# Patient Record
Sex: FEMALE | Race: WHITE | Employment: FULL TIME | URBAN - METROPOLITAN AREA
[De-identification: names, ages, dates, MRNs, and addresses within clinical notes are randomized per-mention and may not be internally consistent; named-entity substitution may affect disease eponyms.]

---

## 2017-01-17 ENCOUNTER — TRANSFERRED RECORDS (OUTPATIENT)
Dept: HEALTH INFORMATION MANAGEMENT | Facility: CLINIC | Age: 42
End: 2017-01-17

## 2017-04-24 NOTE — PROGRESS NOTES
HPI   SUBJECTIVE:                                                    Oly Rush is a 41 year old female who presents to clinic today for the following health issues:    Diarrhea     Onset: March 9th     Description:   Consistency of stool: watery and yellow  Blood in stool: no   Number of loose stools in past 24 hours: 6    Progression of Symptoms:  same    Accompanying Signs & Symptoms:  Fever: YES-  Nausea or vomiting; YES- nauseated   Abdominal pain: YES-and also right-sided flank pain   Episodes of constipation: no   Weight loss: YES   History:   Ill contacts: no   Recent use of antibiotics: no    Recent travels: no          Recent medication-new or changes(Rx or OTC): YES- CBD oil for chronic pain issues. PROBIOTICS EVERY DAY. Taking more natural stuff.     Precipitating factors:   Nothing in particular     Alleviating factors:   Nothing        Therapies Tried and outcome:  NOTHING; Outcome: N/A     Manasa is here today to address ongoing issues with diarrhea since March 9th. Also notes of ongoing cramping and right sided abdominal pain, especially following bowel movements. Notes that she is water drinker, but still feeling dehydrated. Issues started March 9th, notes of yellow watery stools 5-6 times. Over the course of the last weeks had improved to just 1-2 episodes of watery stools. Since the last weekend it has worsened to 5-6 episode. Did not do anything different this past weekend that would cause it to flare up.  Notes of one accident. This happens after eating every meal. Only medication change was starting CBD oil for pain. Oil is extracted from hemp. CBD oil has canaboid properties that help with . Do not drink, just place oil under tongue. Did stop this, just to see if contributing to diarrhea issues. Stopped 3-3 1/2 weeks, no changes. Has not taken anything to slow down diarrhea as she was afraid it would come out the other end. No issues with urine.       Problem list and histories reviewed &  "adjusted, as indicated.  Additional history: as documented    BP Readings from Last 3 Encounters:   04/25/17 114/74   12/30/16 106/76   12/23/16 116/70    Wt Readings from Last 3 Encounters:   04/25/17 87.2 kg (192 lb 4.8 oz)   12/30/16 88 kg (194 lb)   12/23/16 88 kg (194 lb)                  Labs reviewed in EPIC    Reviewed and updated as needed this visit by clinical staff  Tobacco  Allergies  Meds  Problems  Med Hx  Surg Hx  Fam Hx  Soc Hx        Reviewed and updated as needed this visit by Provider         ROS:  CONSTITUTIONAL:POSITIVE  for fever   INTEGUMENTARY/SKIN: NEGATIVE for worrisome rashes, moles or lesions  GI: POSITIVE for diarrhea  MUSCULOSKELETAL: NEGATIVE for significant arthralgias or myalgia  PSYCHIATRIC: NEGATIVE for changes in mood or affect    This document serves as a record of the services and decisions personally performed and made by Carrie Prasad MD. It was created on her behalf by Mel Mina, a trained medical scribe. The creation of this document is based the provider's statements to the medical scribe.  Mel Mina April 25, 2017 9:16 AM    OBJECTIVE:                                                    /74 (BP Location: Right arm, Patient Position: Chair, Cuff Size: Adult Regular)  Pulse 88  Temp 98.1  F (36.7  C) (Oral)  Resp 16  Ht 1.651 m (5' 5\")  Wt 87.2 kg (192 lb 4.8 oz)  LMP 09/26/2012  SpO2 99%  BMI 32 kg/m2  Body mass index is 32 kg/(m^2).  GENERAL: healthy, alert and no distress  EYES: Eyes grossly normal to inspection, PERRL and conjunctivae and sclerae normal  HENT: ear canals and TM's normal, nose and mouth without ulcers or lesions  NECK: no adenopathy, no asymmetry, masses, or scars and thyroid normal to palpation  ABDOMEN: slight tenderness-generalized , no rebound or guarding,  bowel sounds normal  MS: no gross musculoskeletal defects noted, no edema  SKIN: no suspicious lesions or rashes  NEURO: Normal strength and tone, mentation intact " and speech normal  PSYCH: mentation appears normal, affect normal/bright  NO CVA tenderness    Diagnostic Test Results:  Results for orders placed or performed in visit on 04/25/17 (from the past 24 hour(s))   CBC with platelets and differential   Result Value Ref Range    WBC 10.1 4.0 - 11.0 10e9/L    RBC Count 4.62 3.8 - 5.2 10e12/L    Hemoglobin 14.1 11.7 - 15.7 g/dL    Hematocrit 41.9 35.0 - 47.0 %    MCV 91 78 - 100 fl    MCH 30.5 26.5 - 33.0 pg    MCHC 33.7 31.5 - 36.5 g/dL    RDW 12.7 10.0 - 15.0 %    Platelet Count 375 150 - 450 10e9/L    Diff Method Automated Method     % Neutrophils 69.0 %    % Lymphocytes 22.1 %    % Monocytes 7.5 %    % Eosinophils 0.9 %    % Basophils 0.5 %    Absolute Neutrophil 7.0 1.6 - 8.3 10e9/L    Absolute Lymphocytes 2.2 0.8 - 5.3 10e9/L    Absolute Monocytes 0.8 0.0 - 1.3 10e9/L    Absolute Eosinophils 0.1 0.0 - 0.7 10e9/L    Absolute Basophils 0.1 0.0 - 0.2 10e9/L        ASSESSMENT/PLAN:                                                    (R19.7) Diarrhea, unspecified type  (primary encounter diagnosis)  Comment: Continue with BRAT diet. Lomotil to use as needed throughout the day for cramping. Will complete stool tests. If all normal, may complete US. Also discussed treating with cipro.   Plan: Enteric Bacteria and Virus Panel by [facility] Stool,         Clostridium difficile Toxin B PCR, CBC with         platelets and differential, Comprehensive         metabolic panel, Giardia antigen,         diphenoxylate-atropine (LOMOTIL) 2.5-0.025 MG         per tablet        Follow up per stool results.     The information in this document, created by the medical scribe for me, accurately reflects the services I personally performed and the decisions made by me. I have reviewed and approved this document for accuracy prior to leaving the patient care area.  Carrie Prasad MD 9:16 AM 4/25/2017          Carrie Prasad MD  [facility]      Physical Exam

## 2017-04-25 ENCOUNTER — OFFICE VISIT (OUTPATIENT)
Dept: FAMILY MEDICINE | Facility: CLINIC | Age: 42
End: 2017-04-25
Payer: COMMERCIAL

## 2017-04-25 VITALS
HEART RATE: 88 BPM | OXYGEN SATURATION: 99 % | HEIGHT: 65 IN | TEMPERATURE: 98.1 F | DIASTOLIC BLOOD PRESSURE: 74 MMHG | SYSTOLIC BLOOD PRESSURE: 114 MMHG | RESPIRATION RATE: 16 BRPM | WEIGHT: 192.3 LBS | BODY MASS INDEX: 32.04 KG/M2

## 2017-04-25 DIAGNOSIS — R19.7 DIARRHEA, UNSPECIFIED TYPE: Primary | ICD-10-CM

## 2017-04-25 LAB
BASOPHILS # BLD AUTO: 0.1 10E9/L (ref 0–0.2)
BASOPHILS NFR BLD AUTO: 0.5 %
C DIFF TOX B STL QL: NORMAL
CAMPYLOBACTER GROUP BY NAT: NOT DETECTED
DIFFERENTIAL METHOD BLD: NORMAL
ENTERIC PATHOGEN COMMENT: NORMAL
EOSINOPHIL # BLD AUTO: 0.1 10E9/L (ref 0–0.7)
EOSINOPHIL NFR BLD AUTO: 0.9 %
ERYTHROCYTE [DISTWIDTH] IN BLOOD BY AUTOMATED COUNT: 12.7 % (ref 10–15)
HCT VFR BLD AUTO: 41.9 % (ref 35–47)
HGB BLD-MCNC: 14.1 G/DL (ref 11.7–15.7)
LYMPHOCYTES # BLD AUTO: 2.2 10E9/L (ref 0.8–5.3)
LYMPHOCYTES NFR BLD AUTO: 22.1 %
MCH RBC QN AUTO: 30.5 PG (ref 26.5–33)
MCHC RBC AUTO-ENTMCNC: 33.7 G/DL (ref 31.5–36.5)
MCV RBC AUTO: 91 FL (ref 78–100)
MONOCYTES # BLD AUTO: 0.8 10E9/L (ref 0–1.3)
MONOCYTES NFR BLD AUTO: 7.5 %
NEUTROPHILS # BLD AUTO: 7 10E9/L (ref 1.6–8.3)
NEUTROPHILS NFR BLD AUTO: 69 %
NOROVIRUS I AND II BY NAT: NOT DETECTED
PLATELET # BLD AUTO: 375 10E9/L (ref 150–450)
RBC # BLD AUTO: 4.62 10E12/L (ref 3.8–5.2)
ROTAVIRUS A BY NAT: NOT DETECTED
SALMONELLA SPECIES BY NAT: NOT DETECTED
SHIGA TOXIN 1 GENE BY NAT: NOT DETECTED
SHIGA TOXIN 2 GENE BY NAT: NOT DETECTED
SHIGELLA SP+EIEC IPAH STL QL NAA+PROBE: NOT DETECTED
SPECIMEN SOURCE: NORMAL
VIBRIO GROUP BY NAT: NOT DETECTED
WBC # BLD AUTO: 10.1 10E9/L (ref 4–11)
YERSINIA ENTEROCOLITICA BY NAT: NOT DETECTED

## 2017-04-25 PROCEDURE — 99213 OFFICE O/P EST LOW 20 MIN: CPT | Performed by: FAMILY MEDICINE

## 2017-04-25 PROCEDURE — 85025 COMPLETE CBC W/AUTO DIFF WBC: CPT | Performed by: FAMILY MEDICINE

## 2017-04-25 PROCEDURE — 87493 C DIFF AMPLIFIED PROBE: CPT | Mod: 59 | Performed by: FAMILY MEDICINE

## 2017-04-25 PROCEDURE — 87329 GIARDIA AG IA: CPT | Performed by: FAMILY MEDICINE

## 2017-04-25 PROCEDURE — 87506 IADNA-DNA/RNA PROBE TQ 6-11: CPT | Performed by: FAMILY MEDICINE

## 2017-04-25 PROCEDURE — 36415 COLL VENOUS BLD VENIPUNCTURE: CPT | Performed by: FAMILY MEDICINE

## 2017-04-25 PROCEDURE — 80053 COMPREHEN METABOLIC PANEL: CPT | Performed by: FAMILY MEDICINE

## 2017-04-25 RX ORDER — DIPHENOXYLATE HCL/ATROPINE 2.5-.025MG
2 TABLET ORAL 4 TIMES DAILY PRN
Qty: 30 TABLET | Refills: 0 | Status: SHIPPED | OUTPATIENT
Start: 2017-04-25 | End: 2017-07-31

## 2017-04-25 ASSESSMENT — PAIN SCALES - GENERAL: PAINLEVEL: MODERATE PAIN (4)

## 2017-04-25 NOTE — MR AVS SNAPSHOT
After Visit Summary   4/25/2017    Oly Rush    MRN: [id number]           Patient Information     Date Of Birth          1975        Visit Information        Provider Department      4/25/2017 9:00 AM Carrie Prasad MD [facility]        Today's Diagnoses     Diarrhea, unspecified type    -  1       Follow-ups after your visit        Future tests that were ordered for you today     Open Future Orders        Priority Expected Expires Ordered    Enteric Bacteria and Virus Panel by [facility] Stool Routine  4/25/2018 4/25/2017    Clostridium difficile Toxin B PCR Routine  4/25/2018 4/25/2017    Giardia antigen Routine  4/25/2018 4/25/2017            Who to contact     If you have questions or need follow up information about today's clinic visit or your schedule please contact [facility] directly at [phone].  Normal or non-critical lab and imaging results will be communicated to you by MyChart, letter or phone within 4 business days after the clinic has received the results. If you do not hear from us within 7 days, please contact the clinic through MyChart or phone. If you have a critical or abnormal lab result, we will notify you by phone as soon as possible.  Submit refill requests through [facility] or call your pharmacy and they will forward the refill request to us. Please allow 3 business days for your refill to be completed.          Additional Information About Your Visit        MyChart Information     [facility] [employer] gives you secure access to your electronic health record. If you see a primary care provider, you can also send messages to your care team and make appointments. If you have questions, please call your primary care clinic.  If you do not have a primary care provider, please call [phone] and they will assist you.        Care EveryWhere ID     This is your Care EveryWhere ID. This could be used by other organizations to access your  "[facility] medical records  [id number]        Your Vitals Were     Pulse Temperature Respirations Height Last Period Pulse Oximetry    88 98.1  F (36.7  C) (Oral) 16 5' 5\" (1.651 m) 09/26/2012 99%    BMI (Body Mass Index)                   32 kg/m2            Blood Pressure from Last 3 Encounters:   04/25/17 114/74   12/30/16 106/76   12/23/16 116/70    Weight from Last 3 Encounters:   04/25/17 192 lb 4.8 oz (87.2 kg)   12/30/16 194 lb (88 kg)   12/23/16 194 lb (88 kg)              We Performed the Following     CBC with platelets and differential     Comprehensive metabolic panel          Today's Medication Changes          These changes are accurate as of: 4/25/17  9:44 AM.  If you have any questions, ask your nurse or doctor.               Start taking these medicines.        Dose/Directions    diphenoxylate-atropine 2.5-0.025 MG per tablet   Commonly known as:  LOMOTIL   Used for:  Diarrhea, unspecified type   Started by:  Carrie Prasad MD        Dose:  2 tablet   Take 2 tablets by mouth 4 times daily as needed for diarrhea   Quantity:  30 tablet   Refills:  0            Where to get your medicines      Some of these will need a paper prescription and others can be bought over the counter.  Ask your nurse if you have questions.     Bring a paper prescription for each of these medications     diphenoxylate-atropine 2.5-0.025 MG per tablet                Primary Care Provider Office Phone # Fax #    Carrie Prasad [phone]       [facility] [id number]  [address]   [facility] [id number]        Thank you!     Thank you for choosing [facility]  for your care. Our goal is always to provide you with excellent care. Hearing back from our patients is one way we can continue to improve our services. Please take a few minutes to complete the written survey that you may receive in the mail after your visit with us. Thank you!             Your Updated Medication " List - Protect others around you: Learn how to safely use, store and throw away your medicines at www.disposemymeds.org.          This list is accurate as of: 4/25/17  9:44 AM.  Always use your most recent med list.                   Brand Name Dispense Instructions for use    cyclobenzaprine 5 MG tablet    FLEXERIL    30 tablet    Take 1 tablet (5 mg) by mouth nightly as needed for muscle spasms       diphenoxylate-atropine 2.5-0.025 MG per tablet    LOMOTIL    30 tablet    Take 2 tablets by mouth 4 times daily as needed for diarrhea       fluticasone 50 MCG/ACT spray    FLONASE    1 Bottle    Spray 1-2 sprays into both nostrils daily       methocarbamol 500 MG tablet    ROBAXIN    60 tablet    Take 1 tablet (500 mg) by mouth 4 times daily as needed for muscle spasms

## 2017-04-26 LAB
ALBUMIN SERPL-MCNC: 3.8 G/DL (ref 3.4–5)
ALP SERPL-CCNC: 99 U/L (ref 40–150)
ALT SERPL W P-5'-P-CCNC: 41 U/L (ref 0–50)
ANION GAP SERPL CALCULATED.3IONS-SCNC: 8 MMOL/L (ref 3–14)
AST SERPL W P-5'-P-CCNC: 30 U/L (ref 0–45)
BILIRUB SERPL-MCNC: 0.6 MG/DL (ref 0.2–1.3)
BUN SERPL-MCNC: 7 MG/DL (ref 7–30)
CALCIUM SERPL-MCNC: 9 MG/DL (ref 8.5–10.1)
CHLORIDE SERPL-SCNC: 105 MMOL/L (ref 94–109)
CO2 SERPL-SCNC: 27 MMOL/L (ref 20–32)
CREAT SERPL-MCNC: 0.74 MG/DL (ref 0.52–1.04)
G LAMBLIA AG STL QL IA: NORMAL
GFR SERPL CREATININE-BSD FRML MDRD: 86 ML/MIN/1.7M2
GLUCOSE SERPL-MCNC: 84 MG/DL (ref 70–99)
MICRO REPORT STATUS: NORMAL
POTASSIUM SERPL-SCNC: 4 MMOL/L (ref 3.4–5.3)
PROT SERPL-MCNC: 7.7 G/DL (ref 6.8–8.8)
SODIUM SERPL-SCNC: 140 MMOL/L (ref 133–144)
SPECIMEN SOURCE: NORMAL

## 2017-04-27 ENCOUNTER — APPOINTMENT (OUTPATIENT)
Dept: ULTRASOUND IMAGING | Facility: CLINIC | Age: 42
End: 2017-04-27
Attending: EMERGENCY MEDICINE
Payer: COMMERCIAL

## 2017-04-27 ENCOUNTER — HOSPITAL ENCOUNTER (EMERGENCY)
Facility: CLINIC | Age: 42
Discharge: HOME | End: 2017-04-27
Attending: EMERGENCY MEDICINE | Admitting: EMERGENCY MEDICINE
Payer: COMMERCIAL

## 2017-04-27 ENCOUNTER — TELEPHONE (OUTPATIENT)
Dept: FAMILY MEDICINE | Facility: CLINIC | Age: 42
End: 2017-04-27

## 2017-04-27 VITALS
RESPIRATION RATE: 20 BRPM | DIASTOLIC BLOOD PRESSURE: 83 MMHG | SYSTOLIC BLOOD PRESSURE: 132 MMHG | HEART RATE: 76 BPM | BODY MASS INDEX: 31.78 KG/M2 | TEMPERATURE: 98 F | WEIGHT: 191 LBS | OXYGEN SATURATION: 99 %

## 2017-04-27 DIAGNOSIS — R19.7 DIARRHEA, UNSPECIFIED TYPE: Primary | ICD-10-CM

## 2017-04-27 DIAGNOSIS — R19.7 DIARRHEA, UNSPECIFIED TYPE: ICD-10-CM

## 2017-04-27 DIAGNOSIS — R10.84 ABDOMINAL PAIN, GENERALIZED: ICD-10-CM

## 2017-04-27 LAB
ALBUMIN SERPL-MCNC: 3.7 G/DL (ref 3.4–5)
ALBUMIN UR-MCNC: NEGATIVE MG/DL
ALP SERPL-CCNC: 95 U/L (ref 40–150)
ALT SERPL W P-5'-P-CCNC: 41 U/L (ref 0–50)
ANION GAP SERPL CALCULATED.3IONS-SCNC: 5 MMOL/L (ref 3–14)
APPEARANCE UR: CLEAR
AST SERPL W P-5'-P-CCNC: 28 U/L (ref 0–45)
BACTERIA #/AREA URNS HPF: ABNORMAL /HPF
BILIRUB SERPL-MCNC: 0.3 MG/DL (ref 0.2–1.3)
BILIRUB UR QL STRIP: NEGATIVE
BUN SERPL-MCNC: 7 MG/DL (ref 7–30)
CALCIUM SERPL-MCNC: 8.5 MG/DL (ref 8.5–10.1)
CHLORIDE SERPL-SCNC: 106 MMOL/L (ref 94–109)
CO2 SERPL-SCNC: 29 MMOL/L (ref 20–32)
COLOR UR AUTO: ABNORMAL
CREAT SERPL-MCNC: 0.72 MG/DL (ref 0.52–1.04)
ERYTHROCYTE [DISTWIDTH] IN BLOOD BY AUTOMATED COUNT: 12.7 % (ref 10–15)
GFR SERPL CREATININE-BSD FRML MDRD: 88 ML/MIN/1.7M2
GLUCOSE SERPL-MCNC: 78 MG/DL (ref 70–99)
GLUCOSE UR STRIP-MCNC: NEGATIVE MG/DL
HCT VFR BLD AUTO: 42.7 % (ref 35–47)
HGB BLD-MCNC: 14.3 G/DL (ref 11.7–15.7)
HGB UR QL STRIP: NEGATIVE
KETONES UR STRIP-MCNC: NEGATIVE MG/DL
LEUKOCYTE ESTERASE UR QL STRIP: NEGATIVE
LIPASE SERPL-CCNC: 109 U/L (ref 73–393)
MCH RBC QN AUTO: 30.6 PG (ref 26.5–33)
MCHC RBC AUTO-ENTMCNC: 33.5 G/DL (ref 31.5–36.5)
MCV RBC AUTO: 91 FL (ref 78–100)
NITRATE UR QL: NEGATIVE
PH UR STRIP: 7 PH (ref 5–7)
PLATELET # BLD AUTO: 368 10E9/L (ref 150–450)
POTASSIUM SERPL-SCNC: 3.4 MMOL/L (ref 3.4–5.3)
PROT SERPL-MCNC: 7.7 G/DL (ref 6.8–8.8)
RBC # BLD AUTO: 4.67 10E12/L (ref 3.8–5.2)
RBC #/AREA URNS AUTO: <1 /HPF (ref 0–2)
SODIUM SERPL-SCNC: 140 MMOL/L (ref 133–144)
SP GR UR STRIP: 1 (ref 1–1.03)
SQUAMOUS #/AREA URNS AUTO: <1 /HPF (ref 0–1)
URN SPEC COLLECT METH UR: ABNORMAL
UROBILINOGEN UR STRIP-MCNC: 0 MG/DL (ref 0–2)
WBC # BLD AUTO: 13.1 10E9/L (ref 4–11)
WBC #/AREA URNS AUTO: 0 /HPF (ref 0–2)

## 2017-04-27 PROCEDURE — 80053 COMPREHEN METABOLIC PANEL: CPT | Performed by: EMERGENCY MEDICINE

## 2017-04-27 PROCEDURE — 96361 HYDRATE IV INFUSION ADD-ON: CPT

## 2017-04-27 PROCEDURE — 85027 COMPLETE CBC AUTOMATED: CPT | Performed by: EMERGENCY MEDICINE

## 2017-04-27 PROCEDURE — 99285 EMERGENCY DEPT VISIT HI MDM: CPT | Mod: 25

## 2017-04-27 PROCEDURE — 76705 ECHO EXAM OF ABDOMEN: CPT

## 2017-04-27 PROCEDURE — 81001 URINALYSIS AUTO W/SCOPE: CPT | Performed by: EMERGENCY MEDICINE

## 2017-04-27 PROCEDURE — 25000128 H RX IP 250 OP 636: Performed by: EMERGENCY MEDICINE

## 2017-04-27 PROCEDURE — 96375 TX/PRO/DX INJ NEW DRUG ADDON: CPT

## 2017-04-27 PROCEDURE — 83690 ASSAY OF LIPASE: CPT | Performed by: EMERGENCY MEDICINE

## 2017-04-27 PROCEDURE — 96374 THER/PROPH/DIAG INJ IV PUSH: CPT

## 2017-04-27 RX ORDER — ONDANSETRON 2 MG/ML
4 INJECTION INTRAMUSCULAR; INTRAVENOUS ONCE
Status: COMPLETED | OUTPATIENT
Start: 2017-04-27 | End: 2017-04-27

## 2017-04-27 RX ORDER — KETOROLAC TROMETHAMINE 15 MG/ML
15 INJECTION, SOLUTION INTRAMUSCULAR; INTRAVENOUS ONCE
Status: COMPLETED | OUTPATIENT
Start: 2017-04-27 | End: 2017-04-27

## 2017-04-27 RX ORDER — DICYCLOMINE HCL 20 MG
20 TABLET ORAL 4 TIMES DAILY PRN
Qty: 30 TABLET | Refills: 0 | Status: SHIPPED | OUTPATIENT
Start: 2017-04-27 | End: 2017-07-31

## 2017-04-27 RX ADMIN — ONDANSETRON 4 MG: 2 INJECTION INTRAMUSCULAR; INTRAVENOUS at 16:18

## 2017-04-27 RX ADMIN — KETOROLAC TROMETHAMINE 15 MG: 15 INJECTION, SOLUTION INTRAMUSCULAR; INTRAVENOUS at 16:18

## 2017-04-27 RX ADMIN — SODIUM CHLORIDE 1000 ML: 9 INJECTION, SOLUTION INTRAVENOUS at 16:19

## 2017-04-27 ASSESSMENT — ENCOUNTER SYMPTOMS
BACK PAIN: 1
LIGHT-HEADEDNESS: 1
ABDOMINAL PAIN: 1
DIARRHEA: 1
DIZZINESS: 1
VOMITING: 0

## 2017-04-27 NOTE — ED AVS SNAPSHOT
[facility] Emergency Department    [address]    [facility] [address]    Phone:  [phone]    Fax:  [phone]                                       Oly Rush   MRN: [id number]    Department:  [facility] Emergency Department   Date of Visit:  4/27/2017           Patient Information     Date Of Birth          1975        Your diagnoses for this visit were:     Abdominal pain, generalized     Diarrhea, unspecified type        You were seen by Gage Jane MD.      Follow-up Information     Follow up with Carrie Prasad MD. Schedule an appointment as soon as possible for a visit in 5 days.    Specialty:  Family Practice    Contact information:    [facility]  [address]   [facility] [address]  [phone]          Discharge Instructions       Please make an appointment to follow up with [facility] [phone] unless symptoms completely resolve.    Discharge Instructions  Abdominal Pain    Abdominal pain can be caused by many things. Your evaluation today does not show the exact cause for your pain. Your doctor today has decided that it is unlikely your pain is due to a life threatening problem, or a problem requiring surgery or hospital admission. Sometimes those problems cannot be found right away, so it is very important that you follow up as directed.  Sometimes only the changes which occur over time allow the cause of your pain to be found.    Return to the Emergency Department for a recheck in 8-12 hours if your pain continues.  If your pain gets worse, changes in location, or feels different, return to the Emergency Department right away.    ADULTS:  Return to the Emergency Department right away if:      You get an oral temperature above 102oF or as directed by your doctor.    You have blood in your stools (bright red or black, tarry stools).    You keep throwing up or can t drink liquids.    You  see blood when you throw up.    You can t have a bowel movement or you can t pass gas.    Your stomach gets bloated or bigger.    Your skin or the whites of your eyes look yellow.    You faint.    You have bloody, frequent or painful urination.    You have new symptoms or anything that worries you.    CHILDREN:  Return to the Emergency Department right away if your child has any of the above-listed symptoms or the following:      Pushes your hand away or screams/cries when his/her belly is touched.    You notice your child is very fussy or weak.    Your child is very tired and is too tired to eat or drink.    Your child is dehydrated.  Signs of dehydration can be:  o Your infant has had no wet diapers in 4-5 hours.  o Your older child has not passed urine in 6-8 hours.  o Your infant or child starts to have dry mouth and lips, or no saliva or tears.    PREGNANT WOMEN:  Return to the Emergency Department right away if you have any of the above-listed symptoms or the following:      You have bleeding, leaking fluid or passing tissue from the vagina.    You have worse pain or cramping, or pain in your shoulder or back.    You have vomiting that will not stop.    You have painful or bloody urination.    You have a temperature of 100oF or more.    Your baby is not moving as much as usual.    You faint.    You get a bad headache with or without eye problems and abdominal pain.    You have a convulsion or seizure.    You have unusual discharge from your vagina and abdominal pain.    Abdominal pain is pretty common during pregnancy.  Your pain may or may not be related to your pregnancy. You should follow-up closely with your OB doctor so they can evaluate you and your baby.  Until you follow-up with your regular doctor, do the following:       Avoid sex and do not put anything in your vagina.    Drink clear fluids.    Only take medications approved by your doctor.    MORE INFORMATION:    Appendicitis:  A possible cause of  "abdominal pain in any person who still has their appendix is acute appendicitis. Appendicitis is often hard to diagnose.  Testing does not always rule out early appendicitis or other causes of abdominal pain. Close follow-up with your doctor and re-evaluations may be needed to figure out the reason for your abdominal pain.    Follow-up:  It is very important that you make an appointment with your clinic and go to the appointment.  If you do not follow-up with your primary doctor, it may result in missing an important development which could result in permanent injury or disability and/or lasting pain.  If there is any problem keeping your appointment, call your doctor or return to the Emergency Department.    Medications:  Take your medications as directed by your doctor today.  Before using over-the-counter medications, ask your doctor and make sure to take the medications as directed.  If you have any questions about medications, ask your doctor.    Diet:  Resume your normal diet as much as possible, but do not eat fried, fatty or spicy foods while you have pain.  Do not drink alcohol or have caffeine.  Do not smoke tobacco.    Probiotics: If you have been given an antibiotic, you may want to also take a probiotic pill or eat yogurt with live cultures. Probiotics have \"good bacteria\" to help your intestines stay healthy. Studies have shown that probiotics help prevent diarrhea and other intestine problems (including C. diff infection) when you take antibiotics. You can buy these without a prescription in the pharmacy section of the store.     If you were given a prescription for medicine here today, be sure to read all of the information (including the package insert) that comes with your prescription.  This will include important information about the medicine, its side effects, and any warnings that you need to know about.  The pharmacist who fills the prescription can provide more information and answer " questions you may have about the medicine.  If you have questions or concerns that the pharmacist cannot address, please call or return to the Emergency Department.       Discharge Instructions  Adult Diarrhea    You have been seen today for diarrhea. This is usually caused by a virus, but some bacteria, parasites, medicines or other medical conditions can cause similar symptoms. At this time your doctor does not find that your diarrhea is a sign of anything dangerous or life-threatening. However, sometimes the signs of serious illness do not show up right away. If you have new or worse symptoms, you may need to be seen again in the Emergency Department or by your primary doctor.     Return to the Emergency Department if:    You feel you are getting dehydrated, such as being very thirsty, not urinating at least every 8-12 hours, or feeling faint or lightheaded.     You develop a new fever, or your fever continues for more than 2 days.     You have belly pain that seems worse than cramps, is in one spot, or is getting worse over time.     You have blood in your stool or your stool becomes black.  (Remember that if you take Pepto-Bismol , this will turn your stool black).     You feel very weak.    You are not starting to improve within 24 hours of your visit here.    What can I do to help myself?    The most important thing to do is to drink clear liquids.   It is best to have only small, frequent sips of liquids. Drinking too much at once may cause more diarrhea. You should also replace minerals, sodium and potassium lost with diarrhea. Pedialyte  and sports drinks can help you replace these minerals. You can also drink clear liquids such as water, weak tea, apple juice, and 7-Up . Avoid acid liquids (orange), caffeine (coffee) or alcohol. Milk products will make the diarrhea worse.     Eat only bland foods. Soda crackers, toast, plain noodles, gelatin, applesauce and bananas are good first choices. Avoid foods that  "have acid, are spicy, fatty or fibrous (such as meats, coarse grains, vegetables). You may start eating these foods again in about 3 days when you are better.     Sometimes treatment includes prescription medicine to prevent diarrhea. If your doctor prescribes these for you, take them as directed.     Nonprescription medicine is available for the treatment of diarrhea and can be very effective. If you use it, make sure you use the dose recommended on the package. Check with your healthcare provider before you use any medicine for diarrhea.     Don t take ibuprofen, or other nonsteroidal anti-inflammatory medicines without checking with your healthcare provider.   Probiotics: If you have been given an antibiotic, you may want to also take a probiotic pill or eat yogurt with live cultures. Probiotics have \"good bacteria\" to help your intestines stay healthy. Studies have shown that probiotics help prevent diarrhea and other intestine problems (including C. diff infection) when you take antibiotics. You can buy these without a prescription in the pharmacy section of the store.   If you were given a prescription for medicine here today, be sure to read all of the information (including the package insert) that comes with your prescription.  This will include important information about the medicine, its side effects, and any warnings that you need to know about.  The pharmacist who fills the prescription can provide more information and answer questions you may have about the medicine.  If you have questions or concerns that the pharmacist cannot address, please call or return to the Emergency Department.       24 Hour Appointment Hotline       To make an appointment at any [facility], call [phone] ([phone]). If you don't have a family doctor or clinic, we will help you find one. [facility] are conveniently located to serve the needs of you and your family.             Review of your medicines "      START taking        Dose / Directions Last dose taken    dicyclomine 20 MG tablet   Commonly known as:  BENTYL   Dose:  20 mg   Quantity:  30 tablet        Take 1 tablet (20 mg) by mouth 4 times daily as needed   Refills:  0          Our records show that you are taking the medicines listed below. If these are incorrect, please call your family doctor or clinic.        Dose / Directions Last dose taken    cyclobenzaprine 5 MG tablet   Commonly known as:  FLEXERIL   Dose:  5 mg   Quantity:  30 tablet        Take 1 tablet (5 mg) by mouth nightly as needed for muscle spasms   Refills:  3        diphenoxylate-atropine 2.5-0.025 MG per tablet   Commonly known as:  LOMOTIL   Dose:  2 tablet   Quantity:  30 tablet        Take 2 tablets by mouth 4 times daily as needed for diarrhea   Refills:  0        fluticasone 50 MCG/ACT spray   Commonly known as:  FLONASE   Dose:  1-2 spray   Quantity:  1 Bottle        Spray 1-2 sprays into both nostrils daily   Refills:  0        methocarbamol 500 MG tablet   Commonly known as:  ROBAXIN   Dose:  500 mg   Quantity:  60 tablet        Take 1 tablet (500 mg) by mouth 4 times daily as needed for muscle spasms   Refills:  0                Prescriptions were sent or printed at these locations (1 Prescription)                   Other Prescriptions                Printed at Department/Unit printer (1 of 1)         dicyclomine (BENTYL) 20 MG tablet                Procedures and tests performed during your visit     Abdomen US, limited (RUQ only)    CBC (platelets, no diff)    Comprehensive metabolic panel    Lipase    Peripheral IV catheter    UA with Microscopic      Orders Needing Specimen Collection     None      Pending Results     Date and Time Order Name Status Description    4/27/2017 1522 Abdomen US, limited (RUQ only) Preliminary             Pending Culture Results     No orders found from 4/25/2017 to 4/28/2017.            Test Results From Your Hospital Stay        4/27/2017   4:17 PM      Component Results     Component Value Ref Range & Units Status    WBC 13.1 (H) 4.0 - 11.0 10e9/L Final    RBC Count 4.67 3.8 - 5.2 10e12/L Final    Hemoglobin 14.3 11.7 - 15.7 g/dL Final    Hematocrit 42.7 35.0 - 47.0 % Final    MCV 91 78 - 100 fl Final    MCH 30.6 26.5 - 33.0 pg Final    MCHC 33.5 31.5 - 36.5 g/dL Final    RDW 12.7 10.0 - 15.0 % Final    Platelet Count 368 150 - 450 10e9/L Final         4/27/2017  4:34 PM      Component Results     Component Value Ref Range & Units Status    Sodium 140 133 - 144 mmol/L Final    Potassium 3.4 3.4 - 5.3 mmol/L Final    Chloride 106 94 - 109 mmol/L Final    Carbon Dioxide 29 20 - 32 mmol/L Final    Anion Gap 5 3 - 14 mmol/L Final    Glucose 78 70 - 99 mg/dL Final    Urea Nitrogen 7 7 - 30 mg/dL Final    Creatinine 0.72 0.52 - 1.04 mg/dL Final    GFR Estimate 88 >60 mL/min/1.7m2 Final    Non  GFR Calc    GFR Estimate If Black >90   GFR Calc   >60 mL/min/1.7m2 Final    Calcium 8.5 8.5 - 10.1 mg/dL Final    Bilirubin Total 0.3 0.2 - 1.3 mg/dL Final    Albumin 3.7 3.4 - 5.0 g/dL Final    Protein Total 7.7 6.8 - 8.8 g/dL Final    Alkaline Phosphatase 95 40 - 150 U/L Final    ALT 41 0 - 50 U/L Final    AST 28 0 - 45 U/L Final         4/27/2017  4:34 PM      Component Results     Component Value Ref Range & Units Status    Lipase 109 73 - 393 U/L Final         4/27/2017  5:22 PM      Component Results     Component Value Ref Range & Units Status    Color Urine Straw  Final    Appearance Urine Clear  Final    Glucose Urine Negative NEG mg/dL Final    Bilirubin Urine Negative NEG Final    Ketones Urine Negative NEG mg/dL Final    Specific Gravity Urine 1.003 1.003 - 1.035 Final    Blood Urine Negative NEG Final    pH Urine 7.0 5.0 - 7.0 pH Final    Protein Albumin Urine Negative NEG mg/dL Final    Urobilinogen mg/dL 0.0 0.0 - 2.0 mg/dL Final    Nitrite Urine Negative NEG Final    Leukocyte Esterase Urine Negative NEG Final    Source  Midstream Urine  Final    WBC Urine 0 0 - 2 /HPF Final    RBC Urine <1 0 - 2 /HPF Final    Bacteria Urine Few (A) NEG /HPF Final    Squamous Epithelial /HPF Urine <1 0 - 1 /HPF Final         4/27/2017  5:06 PM      Narrative     LIMITED ABDOMEN ULTRASOUND   4/27/2017  4:56 PM     COMPARISON: Abdomen/pelvis CT 1/18/2016.    HISTORY: Right upper quadrant pain.    FINDINGS: The liver demonstrates a mildly coarse, mildly hyperechoic  echotexture consistent with mild diffuse fatty infiltration. There are  no focal liver lesions.    The gallbladder is fluid filled. There is no shadowing or echogenic  material within the gallbladder. There is no gallbladder wall  thickening. There is no pericholecystic fluid. There is no sonographic  Sykes's sign. The common duct is normal in size at 2 mm in diameter.    The pancreas is unremarkable.    The right kidney measures 12.0 x 4.1 cm. Right renal echotexture is  normal. There is no hydronephrosis on the right. There is no renal  cyst on the right.    The abdominal aorta and inferior vena cava are visualized.        Impression     IMPRESSION: Mild diffuse fatty infiltration of the liver. Otherwise,  normal right upper quadrant ultrasound.                Clinical Quality Measure: Blood Pressure Screening     Your blood pressure was checked while you were in the emergency department today. The last reading we obtained was  BP: 118/73 . Please read the guidelines below about what these numbers mean and what you should do about them.  If your systolic blood pressure (the top number) is less than 120 and your diastolic blood pressure (the bottom number) is less than 80, then your blood pressure is normal. There is nothing more that you need to do about it.  If your systolic blood pressure (the top number) is 120-139 or your diastolic blood pressure (the bottom number) is 80-89, your blood pressure may be higher than it should be. You should have your blood pressure rechecked within a  year by a primary care provider.  If your systolic blood pressure (the top number) is 140 or greater or your diastolic blood pressure (the bottom number) is 90 or greater, you may have high blood pressure. High blood pressure is treatable, but if left untreated over time it can put you at risk for heart attack, stroke, or kidney failure. You should have your blood pressure rechecked by a primary care provider within the next 4 weeks.  If your provider in the emergency department today gave you specific instructions to follow-up with your doctor or provider even sooner than that, you should follow that instruction and not wait for up to 4 weeks for your follow-up visit.        Thank you for choosing [facility]       Thank you for choosing [facility] for your care. Our goal is always to provide you with excellent care. Hearing back from our patients is one way we can continue to improve our services. Please take a few minutes to complete the written survey that you may receive in the mail after you visit with us. Thank you!        [facility] Information     [employer] gives you secure access to your electronic health record. If you see a primary care provider, you can also send messages to your care team and make appointments. If you have questions, please call your primary care clinic.  If you do not have a primary care provider, please call [phone] and they will assist you.        Care EveryWhere ID     This is your Care EveryWhere ID. This could be used by other organizations to access your [facility] medical records  [id number]        After Visit Summary       This is your record. Keep this with you and show to your community pharmacist(s) and doctor(s) at your next visit.

## 2017-04-27 NOTE — ED AVS SNAPSHOT
[facility] Emergency Department    [address]    [facility] [address]    Phone:  [phone]    Fax:  [phone]                                       Oly Rush   MRN: [id number]    Department:  [facility] Emergency Department   Date of Visit:  4/27/2017           After Visit Summary Signature Page     I have received my discharge instructions, and my questions have been answered. I have discussed any challenges I see with this plan with the nurse or doctor.    ..........................................................................................................................................  Patient/Patient Representative Signature      ..........................................................................................................................................  Patient Representative Print Name and Relationship to Patient    ..................................................               ................................................  Date                                            Time    ..........................................................................................................................................  Reviewed by Signature/Title    ...................................................              ..............................................  Date                                                            Time

## 2017-04-27 NOTE — ED PROVIDER NOTES
History     Chief Complaint:  Abdominal Pain and Diarrhea      HPI   Oly Rush is a 41 year old female who presents with abdominal pain and diarrhea. For 2 months she has experienced watery yellow diarrhea. On average, she states she has 5-6 BMs per day. She went to see Dr. Prasad on Monday, tests were done and she was found to be negative for C. Diff, Giardia and negative stool culture. She has also experienced abdominal pain on her upper right side which came on more recently, which radiates to the back. The patient reports a break in her symptoms lasting a few days, but they came back this past weekend. She noticed that it hurts more when she is laying on her right side and that it hurts more after eating. Patient reports she has chronic pain and takes Ibuprofen regularly. Associated symptoms are a fever, the lat of which was 4 days ago, lightheadedness and dizziness. She has had 2  sections and a partial hysterectomy in the past. She denies taking any antibiotics or travelling outside the country prior to the onset of her symptoms. Patient denies vomiting.     Allergies:  Flagyl      Medications:    Lomotil  Robaxin  Flonase  Flexeril     Past Medical History:    Anxiety   Cervical cancer    Past Surgical History:    Biopsy of cervix  , low transverse  Da jeffery Hysterectomy total, bilateral and salpingo-oophorectomy combined    Family History:    History reviewed. No pertinent family history.    Social History:  Marital Status:   Presents to the ED with a friend.   Tobacco Use: Never  Alcohol Use: Rare  PCP: Carrie Prasad     Review of Systems   Gastrointestinal: Positive for abdominal pain and diarrhea. Negative for vomiting.   Musculoskeletal: Positive for back pain.   Neurological: Positive for dizziness and light-headedness.   All other systems reviewed and are negative.        Physical Exam   First Vitals:  BP: 116/79  Pulse: 76  Temp: 98  F (36.7  C)  Resp:  20  Weight: 86.6 kg (191 lb)  SpO2: 99 %    Physical Exam    Constitutional:  Pleasant, age appropriate.       Resting comfortably in the bed with no objective signs of pain at rest.  Eyes:    Conjunctiva normal  Neck:    Supple, no meningismus.     CV:     Regular rate and rhythm.      No murmurs, rubs or gallops.     No lower extremity edema.  PULM:    Clear to auscultation bilateral.       No respiratory distress.      Good air exchange.     No rales or wheezing.     No stridor.  ABD:    Soft, non-distended.       Mild tenderness in the RUQ and umbilical area.      Negative Sykes's sign.     Bowel sounds normal.     No pulsatile masses.       No rebound, guarding or rigidity.     No CVA tenderness.      No hepatosplenomegaly.  MSK:     No gross deformity to all four extremities.   LYMPH:   No cervical lymphadenopathy.  NEURO:   Alert.  Good muscular tone, no atrophy.   Skin:    Warm, dry and intact.    Psych:    Mood is good and affect is appropriate.      Emergency Department Course     Imaging:  Abdomen US, per radiology:   IMPRESSION: Mild diffuse fatty infiltration of the liver. Otherwise,  normal right upper quadrant ultrasound.    Radiographic findings were communicated with the patient who voiced understanding of the findings.    Laboratory:  CBC:  WBC 13.1 (H), HGB 14.3, , otherwise WNL  CMP: WNL (Creatinine 0.72)  Lipase: 109  UA: Clear yellow urine,  otherwise WNL    Interventions:  (1618) Toradol, 15 mg, IV injection  (1618) Zofran, 4 mg, IV injection  (1619) Normal Saline, 1 liter, IV bolus    Emergency Department Course:  Nursing notes and vitals reviewed.  I performed an exam of the patient as documented above.  The above workup was undertaken.  1734: I rechecked the patient and discussed results.  Findings and plan explained to the Patient. Patient discharged home, status improved, with instructions regarding supportive care, medications, and reasons to return as well as the importance of  close follow-up was reviewed. Patient was prescribed Bentyl.     Impression & Plan      Medical Decision Makin year old female presents to the ED with a 2 month history of primary complaints of abdominal pain and diarrhea. On examination, she appears well. She had minimal tenderness to right upper quadrant. Pain has not been a large component through much of her history, but today is more severe. She was evaluated for biliary disease which there is none. Basic labs reveal no evidence of electrolyte disturbance and no other concerning pathology. She has been ruled out for infectious diarrhea including C. Diff. This appears to be a chronic process. Differential includes dietary intolerance, IBS, IBD, biliary dyskinesia and colitis. Patient is safe for discharge home, no need for further advanced imaging, supportive treatment indicated and follow up with PCP for further evaluation. I did recommend follow up with GI since her symptoms have not been well defined.     Diagnosis:    ICD-10-CM    1. Abdominal pain, generalized R10.84    2. Diarrhea, unspecified type R19.7        Disposition:  discharged to home    Discharge Medications:  Discharge Medication List as of 2017  5:59 PM      START taking these medications    Details   dicyclomine (BENTYL) 20 MG tablet Take 1 tablet (20 mg) by mouth 4 times daily as needed, Disp-30 tablet, R-0, Local Print               I, Renee Mendoza, am serving as a scribe on 2017 at 3:07 PM to personally document services performed by Dr. Gaston based on my observations and the provider's statements to me.   [facility] EMERGENCY DEPARTMENT       Gage Jane MD  [phone]

## 2017-04-27 NOTE — TELEPHONE ENCOUNTER
Patient calling requesting results of her stool samples.  Advised they were all normal.  States this has been going on for a long time and is still bothering her.  Would like to continue with next step.  Advised Dr. Prasad was out of the office.  States that Dr. Prasad said that if all stool tests were normal, possibly do ultrasound.  She would like this ordered now.  Does not want to wait for Dr. Prasad.    Here are Dr. Prasad's office visit notes from 4/25/2017:    (R19.7) Diarrhea, unspecified type (primary encounter diagnosis)  Comment: Continue with BRAT diet. Lomotil to use as needed throughout the day for cramping. Will complete stool tests. If all normal, may complete US. Also discussed treating with cipro.     Please call patient back with any questions. [phone]    Gwen Yee RN

## 2017-04-27 NOTE — DISCHARGE INSTRUCTIONS
Please make an appointment to follow up with [facility] [phone] unless symptoms completely resolve.    Discharge Instructions  Abdominal Pain    Abdominal pain can be caused by many things. Your evaluation today does not show the exact cause for your pain. Your doctor today has decided that it is unlikely your pain is due to a life threatening problem, or a problem requiring surgery or hospital admission. Sometimes those problems cannot be found right away, so it is very important that you follow up as directed.  Sometimes only the changes which occur over time allow the cause of your pain to be found.    Return to the Emergency Department for a recheck in 8-12 hours if your pain continues.  If your pain gets worse, changes in location, or feels different, return to the Emergency Department right away.    ADULTS:  Return to the Emergency Department right away if:      You get an oral temperature above 102oF or as directed by your doctor.    You have blood in your stools (bright red or black, tarry stools).    You keep throwing up or can t drink liquids.    You see blood when you throw up.    You can t have a bowel movement or you can t pass gas.    Your stomach gets bloated or bigger.    Your skin or the whites of your eyes look yellow.    You faint.    You have bloody, frequent or painful urination.    You have new symptoms or anything that worries you.    CHILDREN:  Return to the Emergency Department right away if your child has any of the above-listed symptoms or the following:      Pushes your hand away or screams/cries when his/her belly is touched.    You notice your child is very fussy or weak.    Your child is very tired and is too tired to eat or drink.    Your child is dehydrated.  Signs of dehydration can be:  o Your infant has had no wet diapers in 4-5 hours.  o Your older child has not passed urine in 6-8 hours.  o Your infant or child starts to have dry mouth and lips, or no  saliva or tears.    PREGNANT WOMEN:  Return to the Emergency Department right away if you have any of the above-listed symptoms or the following:      You have bleeding, leaking fluid or passing tissue from the vagina.    You have worse pain or cramping, or pain in your shoulder or back.    You have vomiting that will not stop.    You have painful or bloody urination.    You have a temperature of 100oF or more.    Your baby is not moving as much as usual.    You faint.    You get a bad headache with or without eye problems and abdominal pain.    You have a convulsion or seizure.    You have unusual discharge from your vagina and abdominal pain.    Abdominal pain is pretty common during pregnancy.  Your pain may or may not be related to your pregnancy. You should follow-up closely with your OB doctor so they can evaluate you and your baby.  Until you follow-up with your regular doctor, do the following:       Avoid sex and do not put anything in your vagina.    Drink clear fluids.    Only take medications approved by your doctor.    MORE INFORMATION:    Appendicitis:  A possible cause of abdominal pain in any person who still has their appendix is acute appendicitis. Appendicitis is often hard to diagnose.  Testing does not always rule out early appendicitis or other causes of abdominal pain. Close follow-up with your doctor and re-evaluations may be needed to figure out the reason for your abdominal pain.    Follow-up:  It is very important that you make an appointment with your clinic and go to the appointment.  If you do not follow-up with your primary doctor, it may result in missing an important development which could result in permanent injury or disability and/or lasting pain.  If there is any problem keeping your appointment, call your doctor or return to the Emergency Department.    Medications:  Take your medications as directed by your doctor today.  Before using over-the-counter medications, ask your  "doctor and make sure to take the medications as directed.  If you have any questions about medications, ask your doctor.    Diet:  Resume your normal diet as much as possible, but do not eat fried, fatty or spicy foods while you have pain.  Do not drink alcohol or have caffeine.  Do not smoke tobacco.    Probiotics: If you have been given an antibiotic, you may want to also take a probiotic pill or eat yogurt with live cultures. Probiotics have \"good bacteria\" to help your intestines stay healthy. Studies have shown that probiotics help prevent diarrhea and other intestine problems (including C. diff infection) when you take antibiotics. You can buy these without a prescription in the pharmacy section of the store.     If you were given a prescription for medicine here today, be sure to read all of the information (including the package insert) that comes with your prescription.  This will include important information about the medicine, its side effects, and any warnings that you need to know about.  The pharmacist who fills the prescription can provide more information and answer questions you may have about the medicine.  If you have questions or concerns that the pharmacist cannot address, please call or return to the Emergency Department.       Discharge Instructions  Adult Diarrhea    You have been seen today for diarrhea. This is usually caused by a virus, but some bacteria, parasites, medicines or other medical conditions can cause similar symptoms. At this time your doctor does not find that your diarrhea is a sign of anything dangerous or life-threatening. However, sometimes the signs of serious illness do not show up right away. If you have new or worse symptoms, you may need to be seen again in the Emergency Department or by your primary doctor.     Return to the Emergency Department if:    You feel you are getting dehydrated, such as being very thirsty, not urinating at least every 8-12 hours, or " "feeling faint or lightheaded.     You develop a new fever, or your fever continues for more than 2 days.     You have belly pain that seems worse than cramps, is in one spot, or is getting worse over time.     You have blood in your stool or your stool becomes black.  (Remember that if you take Pepto-Bismol , this will turn your stool black).     You feel very weak.    You are not starting to improve within 24 hours of your visit here.    What can I do to help myself?    The most important thing to do is to drink clear liquids.   It is best to have only small, frequent sips of liquids. Drinking too much at once may cause more diarrhea. You should also replace minerals, sodium and potassium lost with diarrhea. Pedialyte  and sports drinks can help you replace these minerals. You can also drink clear liquids such as water, weak tea, apple juice, and 7-Up . Avoid acid liquids (orange), caffeine (coffee) or alcohol. Milk products will make the diarrhea worse.     Eat only bland foods. Soda crackers, toast, plain noodles, gelatin, applesauce and bananas are good first choices. Avoid foods that have acid, are spicy, fatty or fibrous (such as meats, coarse grains, vegetables). You may start eating these foods again in about 3 days when you are better.     Sometimes treatment includes prescription medicine to prevent diarrhea. If your doctor prescribes these for you, take them as directed.     Nonprescription medicine is available for the treatment of diarrhea and can be very effective. If you use it, make sure you use the dose recommended on the package. Check with your healthcare provider before you use any medicine for diarrhea.     Don t take ibuprofen, or other nonsteroidal anti-inflammatory medicines without checking with your healthcare provider.   Probiotics: If you have been given an antibiotic, you may want to also take a probiotic pill or eat yogurt with live cultures. Probiotics have \"good bacteria\" to help your " intestines stay healthy. Studies have shown that probiotics help prevent diarrhea and other intestine problems (including C. diff infection) when you take antibiotics. You can buy these without a prescription in the pharmacy section of the store.   If you were given a prescription for medicine here today, be sure to read all of the information (including the package insert) that comes with your prescription.  This will include important information about the medicine, its side effects, and any warnings that you need to know about.  The pharmacist who fills the prescription can provide more information and answer questions you may have about the medicine.  If you have questions or concerns that the pharmacist cannot address, please call or return to the Emergency Department.

## 2017-07-31 ENCOUNTER — OFFICE VISIT (OUTPATIENT)
Dept: FAMILY MEDICINE | Facility: CLINIC | Age: 42
End: 2017-07-31
Payer: COMMERCIAL

## 2017-07-31 VITALS
HEART RATE: 76 BPM | SYSTOLIC BLOOD PRESSURE: 118 MMHG | TEMPERATURE: 98.2 F | WEIGHT: 192.1 LBS | DIASTOLIC BLOOD PRESSURE: 82 MMHG | RESPIRATION RATE: 16 BRPM | BODY MASS INDEX: 31.97 KG/M2

## 2017-07-31 DIAGNOSIS — Z00.00 ENCOUNTER FOR ROUTINE ADULT HEALTH EXAMINATION WITHOUT ABNORMAL FINDINGS: Primary | ICD-10-CM

## 2017-07-31 DIAGNOSIS — R76.8 ELEVATED RHEUMATOID FACTOR: ICD-10-CM

## 2017-07-31 DIAGNOSIS — K21.9 GASTROESOPHAGEAL REFLUX DISEASE WITHOUT ESOPHAGITIS: ICD-10-CM

## 2017-07-31 DIAGNOSIS — M79.10 MYALGIA: ICD-10-CM

## 2017-07-31 LAB
ERYTHROCYTE [DISTWIDTH] IN BLOOD BY AUTOMATED COUNT: 12.6 % (ref 10–15)
ERYTHROCYTE [SEDIMENTATION RATE] IN BLOOD BY WESTERGREN METHOD: 10 MM/H (ref 0–20)
HCT VFR BLD AUTO: 41.6 % (ref 35–47)
HGB BLD-MCNC: 14.2 G/DL (ref 11.7–15.7)
MCH RBC QN AUTO: 30.8 PG (ref 26.5–33)
MCHC RBC AUTO-ENTMCNC: 34.1 G/DL (ref 31.5–36.5)
MCV RBC AUTO: 90 FL (ref 78–100)
PLATELET # BLD AUTO: 333 10E9/L (ref 150–450)
RBC # BLD AUTO: 4.61 10E12/L (ref 3.8–5.2)
WBC # BLD AUTO: 12.5 10E9/L (ref 4–11)

## 2017-07-31 PROCEDURE — 80061 LIPID PANEL: CPT | Performed by: FAMILY MEDICINE

## 2017-07-31 PROCEDURE — 84443 ASSAY THYROID STIM HORMONE: CPT | Performed by: FAMILY MEDICINE

## 2017-07-31 PROCEDURE — 86431 RHEUMATOID FACTOR QUANT: CPT | Performed by: FAMILY MEDICINE

## 2017-07-31 PROCEDURE — 85027 COMPLETE CBC AUTOMATED: CPT | Performed by: FAMILY MEDICINE

## 2017-07-31 PROCEDURE — 86038 ANTINUCLEAR ANTIBODIES: CPT | Performed by: FAMILY MEDICINE

## 2017-07-31 PROCEDURE — 36415 COLL VENOUS BLD VENIPUNCTURE: CPT | Performed by: FAMILY MEDICINE

## 2017-07-31 PROCEDURE — 82306 VITAMIN D 25 HYDROXY: CPT | Performed by: FAMILY MEDICINE

## 2017-07-31 PROCEDURE — 87338 HPYLORI STOOL AG IA: CPT | Performed by: FAMILY MEDICINE

## 2017-07-31 PROCEDURE — 80053 COMPREHEN METABOLIC PANEL: CPT | Performed by: FAMILY MEDICINE

## 2017-07-31 PROCEDURE — 99396 PREV VISIT EST AGE 40-64: CPT | Performed by: FAMILY MEDICINE

## 2017-07-31 PROCEDURE — 85652 RBC SED RATE AUTOMATED: CPT | Performed by: FAMILY MEDICINE

## 2017-07-31 ASSESSMENT — PAIN SCALES - GENERAL: PAINLEVEL: NO PAIN (0)

## 2017-07-31 NOTE — PROGRESS NOTES
SUBJECTIVE:   CC: Oly Rush is an 42 year old woman who presents for preventive health visit.     Physical   Annual:     Getting at least 3 servings of Calcium per day::  Yes    Bi-annual eye exam::  Yes    Dental care twice a year::  NO    Sleep apnea or symptoms of sleep apnea::  Daytime drowsiness    Diet::  Regular (no restrictions)    Frequency of exercise::  4-5 days/week    Duration of exercise::  30-45 minutes    Taking medications regularly::  Yes    Medication side effects::  Muscle aches    Additional concerns today::  YES  1.  Spotting stopped in MAY.  Patient had a partial hysterectomy about 5 years ago.  Unable to remove cervix.    2.  Ongoing joint and Muscle fatigue issues     Joint Pain    Onset: YEARS     Description:   Location: LEGS(thigh area)  AND ARMS(elbows and shoulders)   Character: Dull ache    Intensity: severe, 8/10    Progression of Symptoms: worse    Accompanying Signs & Symptoms:  Other symptoms: numbness in left leg    History:   Previous similar pain: YES      Precipitating factors:   Trauma or overuse: no     Alleviating factors:  Improved by: nothing    Therapies Tried and outcome: stretching-does help     Patient reports that the joint pain is worsening. She states that pain is mostly in the shoulder, elbows and thighs. She states that the weather and exercise can make the symptoms worse. The past winter was bad because her hot tub broke down and she was unable to use it to alleviate pain. Instead she took ibuprofen. Patient is unaware of any family history of arthritis. She takes vitamin D in the winter and magnesium. She also takes buffer vitamin C.     Diarrhea  Patient was in the clinic 4/2017 for diarrhea. Symptoms did not clear and she was admitted to the ER 4/27/2017. After that, symptoms cleared. She states that when she does get diarrhea it will last a couple of days. Patient has been closely monitoring her diet and taking probiotics. She has not gone to see  a Gastroenterologist or has had a colonoscopy preformed. Patient denies blood in the stool.     Acid reflux  She experiences acid reflux when she lays down. She states that she also experienced the symptoms during pregnancy when she was on bed rest. Symptoms will only wake her up at night depending on the diet choices she made that day and if she ate close to bed time.     Migraines  Patient reports that her migraines have improved and are under control.      Today's PHQ-2 Score:   PHQ-2 ( 1999 Pf[employer]) 7/31/2017   Q1: Little interest or pleasure in doing things 0   Q2: Feeling down, depressed or hopeless 0   PHQ-2 Score 0   Q1: Little interest or pleasure in doing things Not at all   Q2: Feeling down, depressed or hopeless Not at all   PHQ-2 Score 0       Abuse: Current or Past(Physical, Sexual or Emotional)- NO  Do you feel safe in your environment - YES    Social History   Substance Use Topics     Smoking status: Never Smoker     Smokeless tobacco: Never Used     Alcohol use 0.0 oz/week     0 Standard drinks or equivalent per week      Comment: 1 YEARLY     The patient does not drink >3 drinks per day nor >7 drinks per week.    Reviewed orders with patient.  Reviewed health maintenance and updated orders accordingly - Yes  Labs reviewed in EPIC  BP Readings from Last 3 Encounters:   07/31/17 118/82   04/27/17 132/83   04/25/17 114/74    Wt Readings from Last 3 Encounters:   07/31/17 87.1 kg (192 lb 1.6 oz)   04/27/17 86.6 kg (191 lb)   04/25/17 87.2 kg (192 lb 4.8 oz)         Mammogram not appropriate for this patient based on age.  Mammo discussed, not appropriate for or declined by this patient.    Pertinent mammograms are reviewed under the imaging tab.  History of abnormal Pap smear: NO - age 30- 65 PAP every 3 years recommended    Reviewed and updated as needed this visit by clinical staffTobacco  Allergies  Meds  Problems  Med Hx  Surg Hx  Fam Hx  Soc Hx          Reviewed and updated as needed this  visit by Provider            ROS:  C: NEGATIVE for fever, chills, change in weight  I: NEGATIVE for worrisome rashes, moles or lesions  E: NEGATIVE for vision changes or irritation  ENT: NEGATIVE for ear, mouth and throat problems  R: NEGATIVE for significant cough or SOB  B: NEGATIVE for masses, tenderness or discharge  CV: NEGATIVE for chest pain, palpitations or peripheral edema  GI: NEGATIVE for nausea, abdominal pain, heartburn, or change in bowel habits  : NEGATIVE for unusual urinary or vaginal symptoms. No vaginal bleeding.  M: POSITIVE for join pain NEGATIVE for significant arthralgias or myalgia  N: NEGATIVE for weakness, dizziness or paresthesias  P: NEGATIVE for changes in mood or affect      This document serves as a record of the services and decisions personally performed and made by Carrie Prasad MD. It was created on her behalf by Misa Silva, a trained medical scribe. The creation of this document is based on the provider's statements to the medical scribe.  Misa Silva July 31, 2017 7:47 AM       OBJECTIVE:   /82 (BP Location: Right arm, Patient Position: Chair, Cuff Size: Adult Regular)  Pulse 76  Temp 98.2  F (36.8  C) (Oral)  Resp 16  Wt 192 lb 1.6 oz (87.1 kg)  LMP 09/26/2012  BMI 31.97 kg/m2    EXAM:  GENERAL APPEARANCE: healthy, alert and no distress  EYES: Eyes grossly normal to inspection, PERRL and conjunctivae and sclerae normal  HENT: ear canals and TM's normal, nose and mouth without ulcers or lesions, oropharynx clear and oral mucous membranes moist  NECK: no adenopathy, no asymmetry, masses, or scars and thyroid normal to palpation  RESP: lungs clear to auscultation - no rales, rhonchi or wheezes  BREAST: normal without masses, tenderness or nipple discharge and no palpable axillary masses or adenopathy  CV: regular rate and rhythm, normal S1 S2, no S3 or S4, no murmur, click or rub, no peripheral edema and peripheral pulses strong  ABDOMEN: soft, no  "hepatosplenomegaly, no masses and bowel sounds normal. Tender in the epigastric region  MS: no musculoskeletal defects are noted and gait is age appropriate without ataxia. Shoulders, knees, base of neck, and lower .   SKIN: no suspicious lesions or rashes  NEURO: Normal strength and tone, sensory exam grossly normal, mentation intact and speech normal  PSYCH: mentation appears normal and affect normal/bright    ASSESSMENT/PLAN:   (Z00.00) Encounter for routine adult health examination without abnormal findings  (primary encounter diagnosis), no need for annual paps, pt was reassured, normal paps since age 25  Comment: Overall patient is doing well.   Plan: Lipid panel reflex to direct LDL, Comprehensive        metabolic panel, TSH with free T4 reflex, CBC         with platelets, Vitamin D Deficiency          (K21.9) Gastroesophageal reflux disease without esophagitis  Comment: Patient's diarrhea symptoms have improved, but are still present. Will preform a H Pylori antigen stool test. Discussed plant based whole foods diet, probiotic, high fiber foods  Plan: H Pylori antigen stool          (M79.1) Myalgia  Comment: Patient's joint pain has worsened.  Plan: ESR: Erythrocyte sedimentation rate, Rheumatoid        factor, Antinuclear antibody screen by EIA          COUNSELING:  Reviewed preventive health counseling, as reflected in patient instructions       Regular exercise       Healthy diet/nutrition    BP Screening:   Last 3 BP Readings:    BP Readings from Last 3 Encounters:   07/31/17 118/82   04/27/17 132/83   04/25/17 114/74       The following was recommended to the patient:  Re-screen BP within a year and recommended lifestyle modifications     reports that she has never smoked. She has never used smokeless tobacco.  Estimated body mass index is 31.97 kg/(m^2) as calculated from the following:    Height as of 4/25/17: 5' 5\" (1.651 m).    Weight as of this encounter: 192 lb 1.6 oz (87.1 kg). "   Weight management plan: Discussed healthy diet and exercise guidelines and patient will follow up in 12 months in clinic to re-evaluate.    Counseling Resources:  ATP IV Guidelines  Pooled Cohorts Equation Calculator  Breast Cancer Risk Calculator  FRAX Risk Assessment  ICSI Preventive Guidelines  Dietary Guidelines for Americans, 2010  USDA's MyPlate  ASA Prophylaxis  Lung CA Screening    The information in this document, created by the medical scribe for me, accurately reflects the services I personally performed and the decisions made by me. I have reviewed and approved this document for accuracy prior to leaving the patient care area.  July 31, 2017 7:47 AM    Carrie Prasad MD  [facility]

## 2017-07-31 NOTE — NURSING NOTE
"Chief Complaint   Patient presents with     Physical     Blood Draw     LABS.  Patient is fasting     Initial /82 (BP Location: Right arm, Patient Position: Chair, Cuff Size: Adult Regular)  Pulse 76  Temp 98.2  F (36.8  C) (Oral)  Resp 16  Wt 192 lb 1.6 oz (87.1 kg)  LMP 09/26/2012  BMI 31.97 kg/m2 Estimated body mass index is 31.97 kg/(m^2) as calculated from the following:    Height as of 4/25/17: 5' 5\" (1.651 m).    Weight as of this encounter: 192 lb 1.6 oz (87.1 kg).  BP completed using cuff size regular right arm.    Lisa Magill, CMA    "

## 2017-07-31 NOTE — MR AVS SNAPSHOT
After Visit Summary   7/31/2017    Oly Rush    MRN: [id number]           Patient Information     Date Of Birth          1975        Visit Information        Provider Department      7/31/2017 7:20 AM Carrie Prasad MD [facility]        Today's Diagnoses     Encounter for routine adult health examination without abnormal findings    -  1    Gastroesophageal reflux disease without esophagitis        Myalgia          Care Instructions      Preventive Health Recommendations  Female Ages 40 to 49    Yearly exam:     See your health care provider every year in order to  1. Review health changes.   2. Discuss preventive care.    3. Review your medicines if your doctor prescribed any.      Get a Pap test every three years (unless you have an abnormal result and your provider advises testing more often).      If you get Pap tests with HPV test, you only need to test every 5 years, unless you have an abnormal result. You do not need a Pap test if your uterus was removed (hysterectomy) and you have not had cancer.      You should be tested each year for STDs (sexually transmitted diseases), if you're at risk.       Ask your doctor if you should have a mammogram.      Have a colonoscopy (test for colon cancer) if someone in your family has had colon cancer or polyps before age 50.       Have a cholesterol test every 5 years.       Have a diabetes test (fasting glucose) after age 45. If you are at risk for diabetes, you should have this test every 3 years.    Shots: Get a flu shot each year. Get a tetanus shot every 10 years.     Nutrition:     Eat at least 5 servings of fruits and vegetables each day.    Eat whole-grain bread, whole-wheat pasta and brown rice instead of white grains and rice.    Talk to your provider about Calcium and Vitamin D.     Lifestyle    Exercise at least 150 minutes a week (an average of 30 minutes a day, 5 days a week). This will help you control  your weight and prevent disease.    Limit alcohol to one drink per day.    No smoking.     Wear sunscreen to prevent skin cancer.    See your dentist every six months for an exam and cleaning.          Follow-ups after your visit        Future tests that were ordered for you today     Open Future Orders        Priority Expected Expires Ordered    H Pylori antigen stool Routine  8/30/2017 7/31/2017            Who to contact     If you have questions or need follow up information about today's clinic visit or your schedule please contact [facility] directly at [phone].  Normal or non-critical lab and imaging results will be communicated to you by [email or web address], letter or phone within 4 business days after the clinic has received the results. If you do not hear from us within 7 days, please contact the clinic through [employer] or phone. If you have a critical or abnormal lab result, we will notify you by phone as soon as possible.  Submit refill requests through [employer] or call your pharmacy and they will forward the refill request to us. Please allow 3 business days for your refill to be completed.          Additional Information About Your Visit        [employer] Information     [employer] gives you secure access to your electronic health record. If you see a primary care provider, you can also send messages to your care team and make appointments. If you have questions, please call your primary care clinic.  If you do not have a primary care provider, please call [phone] and they will assist you.        Care EveryWhere ID     This is your Care EveryWhere ID. This could be used by other organizations to access your [place] medical records  [id number]        Your Vitals Were     Pulse Temperature Respirations Last Period BMI (Body Mass Index)       76 98.2  F (36.8  C) (Oral) 16 09/26/2012 31.97 kg/m2        Blood Pressure from Last 3 Encounters:   07/31/17 118/82   04/27/17 132/83   04/25/17 114/74     Weight from Last 3 Encounters:   07/31/17 192 lb 1.6 oz (87.1 kg)   04/27/17 191 lb (86.6 kg)   04/25/17 192 lb 4.8 oz (87.2 kg)              We Performed the Following     Antinuclear antibody screen by EIA     CBC with platelets     Comprehensive metabolic panel     ESR: Erythrocyte sedimentation rate     Lipid panel reflex to direct LDL     Rheumatoid factor     TSH with free T4 reflex     Vitamin D Deficiency        Primary Care Provider Office Phone # Fax #    Carrie Peg Prasad [phone]       [facility] [address]  [address]   [facility] [address]        Equal Access to Services     [facility] : Hadii aad ku hadasho Soomaali, waaxda luqadaha, qaybta kaalmada adeegyada, luz [place] . So [facility] [phone].    ATENCIÓN: Si habla español, tiene a zamarripa disposición servicios gratuitos de asistencia lingüística. Llame al [phone].    We comply with applicable federal civil rights laws and [place] laws. We do not discriminate on the basis of race, color, national origin, age, disability sex, sexual orientation or gender identity.            Thank you!     Thank you for choosing [facility]  for your care. Our goal is always to provide you with excellent care. Hearing back from our patients is one way we can continue to improve our services. Please take a few minutes to complete the written survey that you may receive in the mail after your visit with us. Thank you!             Your Updated Medication List - Protect others around you: Learn how to safely use, store and throw away your medicines at www.disposemymeds.org.          This list is accurate as of: 7/31/17  8:10 AM.  Always use your most recent med list.                   Brand Name Dispense Instructions for use Diagnosis    cyclobenzaprine 5 MG tablet    FLEXERIL    30 tablet    Take 1 tablet (5 mg) by mouth nightly as needed for muscle spasms    Myalgia and myositis        methocarbamol 500 MG tablet    ROBAXIN    60 tablet    Take 1 tablet (500 mg) by mouth 4 times daily as needed for muscle spasms    Chest wall pain

## 2017-07-31 NOTE — PROGRESS NOTES
"   SUBJECTIVE:   CC: Oly Rush is an 42 year old woman who presents for preventive health visit.     Healthy Habits:    Do you get at least three servings of calcium containing foods daily (dairy, green leafy vegetables, etc.)? {YES/NO, DAIRY INTAKE:736419::\"yes\"}    Amount of exercise or daily activities, outside of work: {AMOUNT EXERCISE:466910}    Problems taking medications regularly {Yes /No default:184734::\"No\"}    Medication side effects: {Yes /No default.:676630::\"No\"}    Have you had an eye exam in the past two years? {YESNOBLANK:187619}    Do you see a dentist twice per year? {YESNOBLANK:383791}    Do you have sleep apnea, excessive snoring or daytime drowsiness?{YESNOBLANK:118439}    {Outside tests to abstract? :534261}    {additional problems to add (Optional):126584}    Today's PHQ-2 Score:   PHQ-2 ( 1999 [employer]) 7/31/2017 4/25/2017   Q1: Little interest or pleasure in doing things - 0   Q2: Feeling down, depressed or hopeless - 0   PHQ-2 Score - 0   Q1: Little interest or pleasure in doing things Not at all -   Q2: Feeling down, depressed or hopeless Not at all -   PHQ-2 Score 0 -     {PHQ-2 LOOK IN ASSESSMENTS (Optional) :948438}  Abuse: Current or Past(Physical, Sexual or Emotional)- {YES/NO/NA:354074}  Do you feel safe in your environment - {YES/NO/NA:757415}    Social History   Substance Use Topics     Smoking status: Never Smoker     Smokeless tobacco: Never Used     Alcohol use 0.0 oz/week     0 Standard drinks or equivalent per week      Comment: 1 YEARLY     {ETOH AUDIT:349841}    Reviewed orders with patient.  Reviewed health maintenance and updated orders accordingly - {Yes/No:496480::\"Yes\"}  {Chronicprobdata (Optional):557643}    {Mammo Decision Support (Optional):937890}    Pertinent mammograms are reviewed under the imaging tab.  History of abnormal Pap smear: {PAP HX:469725}    Reviewed and updated as needed this visit by clinical staff         Reviewed and updated as needed this " "visit by Provider        {HISTORY OPTIONS (Optional):156399}    ROS:  {FEMALE PREVENTATIVE ROS:792382}    OBJECTIVE:   LMP 09/26/2012  EXAM:  {Exam Choices:239505}    ASSESSMENT/PLAN:   {Diag Picklist:990767}    COUNSELING:   {FEMALE COUNSELING MESSAGES:212581::\"Reviewed preventive health counseling, as reflected in patient instructions\"}    {BP Counseling- Complete if BP >= 120/80  (Optional):354641}     reports that she has never smoked. She has never used smokeless tobacco.  {Tobacco Cessation -- Complete if patient is a smoker (Optional):515830}  Estimated body mass index is 31.78 kg/(m^2) as calculated from the following:    Height as of 4/25/17: 5' 5\" (1.651 m).    Weight as of 4/27/17: 191 lb (86.6 kg).   {Weight Management Plan (ACO) Complete if BMI is abnormal-  Ages 18-64  BMI >24.9.  Age 65+ with BMI <23 or >30 (Optional):364787}    Counseling Resources:  ATP IV Guidelines  Pooled Cohorts Equation Calculator  Breast Cancer Risk Calculator  FRAX Risk Assessment  ICSI Preventive Guidelines  Dietary Guidelines for Americans, 2010  [employer]'s MyPlate  ASA Prophylaxis  Lung CA Screening    Carrie Prasad MD  [facility]  "

## 2017-08-01 LAB
ALBUMIN SERPL-MCNC: 3.8 G/DL (ref 3.4–5)
ALP SERPL-CCNC: 90 U/L (ref 40–150)
ALT SERPL W P-5'-P-CCNC: 29 U/L (ref 0–50)
ANA SER QL IA: NORMAL
ANION GAP SERPL CALCULATED.3IONS-SCNC: 8 MMOL/L (ref 3–14)
AST SERPL W P-5'-P-CCNC: 22 U/L (ref 0–45)
BILIRUB SERPL-MCNC: 0.6 MG/DL (ref 0.2–1.3)
BUN SERPL-MCNC: 9 MG/DL (ref 7–30)
CALCIUM SERPL-MCNC: 9.3 MG/DL (ref 8.5–10.1)
CHLORIDE SERPL-SCNC: 104 MMOL/L (ref 94–109)
CHOLEST SERPL-MCNC: 210 MG/DL
CO2 SERPL-SCNC: 25 MMOL/L (ref 20–32)
CREAT SERPL-MCNC: 0.76 MG/DL (ref 0.52–1.04)
DEPRECATED CALCIDIOL+CALCIFEROL SERPL-MC: 29 UG/L (ref 20–75)
GFR SERPL CREATININE-BSD FRML MDRD: 83 ML/MIN/1.7M2
GLUCOSE SERPL-MCNC: 76 MG/DL (ref 70–99)
H PYLORI AG STL QL IA: NORMAL
HDLC SERPL-MCNC: 60 MG/DL
LDLC SERPL CALC-MCNC: 127 MG/DL
MICRO REPORT STATUS: NORMAL
NONHDLC SERPL-MCNC: 150 MG/DL
POTASSIUM SERPL-SCNC: 3.8 MMOL/L (ref 3.4–5.3)
PROT SERPL-MCNC: 7.5 G/DL (ref 6.8–8.8)
SODIUM SERPL-SCNC: 137 MMOL/L (ref 133–144)
SPECIMEN SOURCE: NORMAL
TRIGL SERPL-MCNC: 113 MG/DL
TSH SERPL DL<=0.005 MIU/L-ACNC: 1.54 MU/L (ref 0.4–4)

## 2017-08-02 LAB — RHEUMATOID FACT SER NEPH-ACNC: 33 IU/ML (ref 0–20)

## 2017-08-04 ENCOUNTER — OFFICE VISIT (OUTPATIENT)
Dept: RHEUMATOLOGY | Facility: CLINIC | Age: 42
End: 2017-08-04
Attending: FAMILY MEDICINE
Payer: COMMERCIAL

## 2017-08-04 VITALS
TEMPERATURE: 98.2 F | OXYGEN SATURATION: 98 % | DIASTOLIC BLOOD PRESSURE: 70 MMHG | BODY MASS INDEX: 31.92 KG/M2 | HEART RATE: 87 BPM | HEIGHT: 65 IN | SYSTOLIC BLOOD PRESSURE: 106 MMHG | WEIGHT: 191.6 LBS

## 2017-08-04 DIAGNOSIS — M79.10 MYALGIA: Primary | ICD-10-CM

## 2017-08-04 LAB
CRP SERPL-MCNC: 11 MG/L (ref 0–8)
HBV CORE AB SERPL QL IA: NONREACTIVE
HBV SURFACE AG SERPL QL IA: NONREACTIVE
HCV AB SERPL QL IA: NORMAL

## 2017-08-04 PROCEDURE — 99204 OFFICE O/P NEW MOD 45 MIN: CPT | Performed by: INTERNAL MEDICINE

## 2017-08-04 PROCEDURE — 86704 HEP B CORE ANTIBODY TOTAL: CPT | Performed by: INTERNAL MEDICINE

## 2017-08-04 PROCEDURE — 86200 CCP ANTIBODY: CPT | Performed by: INTERNAL MEDICINE

## 2017-08-04 PROCEDURE — 87340 HEPATITIS B SURFACE AG IA: CPT | Performed by: INTERNAL MEDICINE

## 2017-08-04 PROCEDURE — 86140 C-REACTIVE PROTEIN: CPT | Performed by: INTERNAL MEDICINE

## 2017-08-04 PROCEDURE — 36415 COLL VENOUS BLD VENIPUNCTURE: CPT | Performed by: INTERNAL MEDICINE

## 2017-08-04 PROCEDURE — 86803 HEPATITIS C AB TEST: CPT | Performed by: INTERNAL MEDICINE

## 2017-08-04 RX ORDER — CRANBERRY FRUIT EXTRACT 200 MG
CAPSULE ORAL
COMMUNITY
Start: 2017-08-04

## 2017-08-04 RX ORDER — MULTIVIT WITH MINERALS/LUTEIN
1000 TABLET ORAL DAILY
Qty: 30 TABLET | COMMUNITY
Start: 2017-08-04

## 2017-08-04 ASSESSMENT — ROUTINE ASSESSMENT OF PATIENT INDEX DATA (RAPID3)
RAPID3 INTERPRETATION: HIGH > 12.0
TOTAL RAPID3 SCORE: 12.2

## 2017-08-04 NOTE — PROGRESS NOTES
[facility] - Rheumatology Clinic Visit     Oly Rush MRN# [id number]   YOB: 1975    Primary care provider: Carrie Prasad  Aug 4, 2017          Assessment and Plan:   # Myalgia- chronic  # Elevated rheumatoid factor > 30  # Cervical cancer in 20s; s/p partial hysterectomy  # Family history of fibromyalgia    Aguilar widespread myalgia and fatigue are consistent with possible fibromyalgia and NOT rheumatoid arthritis.   The elevated rheumatoid factor only increases her risk of developing RA in future.   We will screen for hepatitis B and C for elevated RF.   KRISTEN negative. Vit D and TSH within normal limits    The labs, imaging from patient records are reviewed.     I will be back in touch with the patient through mychart/letter when results are available.     Data Unavailable F/u in 1 year    Orders Placed This Encounter   Procedures     CRP inflammation     Cyclic Citrullinated Peptide Antibody IgG     Hepatitis B surface antigen     Hepatitis B core antibody     Hepatitis C antibody       There are no discontinued medications.  Current Outpatient Prescriptions   Medication Sig Dispense Refill     ascorbic acid (VITAMIN C) 1000 MG TABS Take 1 tablet (1,000 mg) by mouth daily 30 tablet      vitamin B complex with vitamin C (VITAMIN  B COMPLEX) TABS tablet Take 1 tablet by mouth daily  0     cholecalciferol (VITAMIN D3) 5000 UNITS TABS tablet Take by mouth daily       Probiotic Product (ACIDOPHILUS PROBIOTIC BLEND) CAPS        methocarbamol (ROBAXIN) 500 MG tablet Take 1 tablet (500 mg) by mouth 4 times daily as needed for muscle spasms 60 tablet 0     cyclobenzaprine (FLEXERIL) 5 MG tablet Take 1 tablet (5 mg) by mouth nightly as needed for muscle spasms 30 tablet 3       Atilio Wallace MD  [facility]          Active Problem List:     Patient Active Problem List    Diagnosis Date Noted     Cervical cancer (H) 07/23/2015     Priority: Medium     Cone bx and leep,  age 24yo, normal paps since then       Low back pain 07/14/2014     Priority: Medium     Diagnosis updated by automated process. Provider to review and confirm.       S/P abdominal hysterectomy and left salpingo-oophorectomy 10/09/2012     Priority: Medium     2001 Pt reported severe dysplasia, Conization LEEP.   7/14/09 NIL  6/25/10 NIL  4/18/11 NIL  2/22/12 NIL  10/2/12 supracervical hysterectomy.   7/14/14 NIL/neg HPV  3/15/16 NIL/neg HPV. Plan: Cotest in 3 years.        CARDIOVASCULAR SCREENING; LDL GOAL LESS THAN 160 07/04/2010     Priority: Medium     Migraine 11/27/2009     Priority: Medium     Problem list name updated by automated process. Provider to review       Anxiety      Priority: Medium            History of Present Illness:     Chief Complaint   Patient presents with     Establish Care       August 4, 2017  Joint pain history  Onset: 5-6 years ago (worse in the last 2 years)  Involved joints: legs from hip to knees, shoulders to wrists  Pain scale:  7.5/10   (more towards the end of the day) ; humidity  Wakes the patient from sleep: Yes  Morning stiffness: No  Meds used:ibuprofen every day last winter; CBD oil helped     Interim history  Since last visit:  1. Infections - Yes/ cold last month. Has severe diarrhea for 2 months in may  2. New symptoms/medical problem - No  3. Any side effects from Rheum medications -NA  3. ER visits/Hospitalizations/surgeries - Yes 1.5 years ER for vaginosis and cyst on ovary, given flagyl , pt had a reaction and since the has not been the same  4. Last PCP visit: 7/31/17    Fatigue 6/10  abdominal pain on and off     No h/o unintentional weight loss, loss of appetite, fevers, rash, swollen glands  No family or personal history of psoriasis, ulcerative colitis or chron's disease.   Patient denies any raynauds  No h/o arterial/venous thrombosis in the past  No h/o persistent shortness of breath, cough, chest pain  No h/o persistent nausea, vomiting, constipation,  diarrhea,   No h/o hematochezia, hematuria, hemoptysis, hematemesis  No h/o seizures     BP Readings from Last 3 Encounters:   17 106/70   17 118/82   17 132/83              Review of Systems:   Complete ROS negative except for symptoms mentioned in the HPI          Past Medical History:     Past Medical History:   Diagnosis Date     Abnormal Pap smear     cone bx     Anxiety      Past Surgical History:   Procedure Laterality Date     C NONSPECIFIC PROCEDURE      Cone Biopsy of cervix     C/SECTION, LOW TRANSVERSE      , Low Transverse     C/SECTION, LOW TRANSVERSE  2004    , Low Transverse     DAVINCI HYSTERECTOMY TOTAL, BILATERAL SALPINGO-OOPHORECTOMY, COMBINED  10/2/2012    Procedure: COMBINED DAVINCI HYSTERECTOMY TOTAL, SALPINGO-OOPHORECTOMY;   DAVINCI  Supra cervical HYSTERECTOMY TOTAL, Bilateral Salpingectomies with Left Oophorectomy and Cystoscopy;  Surgeon: Manasa Santos DO;  Location:  OR            Social History:     Social History     Occupational History      [employer]     Social History Main Topics     Smoking status: Never Smoker     Smokeless tobacco: Never Used     Alcohol use 0.0 oz/week     0 Standard drinks or equivalent per week      Comment: socially      Drug use: No     Sexual activity: Yes     Partners: Female            Family History:     Family History   Problem Relation Age of Onset     C.A.D. No family hx of      DIABETES No family hx of      Hypertension No family hx of      Breast Cancer No family hx of      Cancer - colorectal No family hx of             Allergies:     Allergies   Allergen Reactions     Flagyl [Metronidazole] Other (See Comments)     paresthesias            Medications:     Current Outpatient Prescriptions   Medication Sig Dispense Refill     ascorbic acid (VITAMIN C) 1000 MG TABS Take 1 tablet (1,000 mg) by mouth daily 30 tablet      vitamin B complex with vitamin C (VITAMIN  B COMPLEX) TABS tablet Take 1  "tablet by mouth daily  0     cholecalciferol (VITAMIN D3) 5000 UNITS TABS tablet Take by mouth daily       Probiotic Product (ACIDOPHILUS PROBIOTIC BLEND) CAPS        methocarbamol (ROBAXIN) 500 MG tablet Take 1 tablet (500 mg) by mouth 4 times daily as needed for muscle spasms 60 tablet 0     cyclobenzaprine (FLEXERIL) 5 MG tablet Take 1 tablet (5 mg) by mouth nightly as needed for muscle spasms 30 tablet 3            Physical Exam:   Blood pressure 106/70, pulse 87, temperature 98.2  F (36.8  C), temperature source Oral, height 1.651 m (5' 5\"), weight 86.9 kg (191 lb 9.6 oz), last menstrual period 09/26/2012, SpO2 98 %, not currently breastfeeding.  Wt Readings from Last 4 Encounters:   08/04/17 86.9 kg (191 lb 9.6 oz)   07/31/17 87.1 kg (192 lb 1.6 oz)   04/27/17 86.6 kg (191 lb)   04/25/17 87.2 kg (192 lb 4.8 oz)       Constitutional: well-developed, appearing stated age; cooperative  Eyes: PERRLA, normal conjunctiva, sclera  ENT: nl external ears, nose, lips.No mucous membrane lesions, normal saliva pool  Neck: no cervical lymphadenopathy  Resp: lungs clear to auscultation,   CV: RRR, no added sounds, no edema  GI: Abdomen soft and no tenderness  : not tested  Lymph: no cervical, supraclavicular or epitrochlear nodes  MS: Multiple fibromyalgia tender points are positive in upper and lower body.   All shoulder, elbow, wrist, MCP/PIP/DIP, hip, knee, ankle, and foot MTP/IP joints were examined and  found normal. No active synovitis or deformity. Full ROM.  Fist 100%.  No dactylitis,  tenosynovitis, enthesopathy.  Skin: no nail pitting; no rash in exposed areas  Psych: nl judgement, orientation, memory, affect.         Data:   Reviewed following labs:  CBC RESULTS:   Recent Labs   Lab Test  07/31/17 0814   WBC  12.5*   RBC  4.61   HGB  14.2   HCT  41.6   MCV  90   MCH  30.8   MCHC  34.1   RDW  12.6   PLT  333       Liver Function Studies -   Recent Labs   Lab Test  07/31/17 0814   PROTTOTAL  7.5   ALBUMIN  " 3.8   BILITOTAL  0.6   ALKPHOS  90   AST  22   ALT  29       Creatinine   Date Value Ref Range Status   07/31/2017 0.76 0.52 - 1.04 mg/dL Final   ]    No results found for: URIC]    Atilio Wallace MD    [facility]

## 2017-08-04 NOTE — NURSING NOTE
"Chief Complaint   Patient presents with     [facility] Care       Initial /70 (BP Location: Right arm, Patient Position: Chair, Cuff Size: Adult Regular)  Pulse 87  Temp 98.2  F (36.8  C) (Oral)  Ht 1.651 m (5' 5\")  Wt 86.9 kg (191 lb 9.6 oz)  LMP 09/26/2012  SpO2 98%  BMI 31.88 kg/m2 Estimated body mass index is 31.88 kg/(m^2) as calculated from the following:    Height as of this encounter: 1.651 m (5' 5\").    Weight as of this encounter: 86.9 kg (191 lb 9.6 oz).  Medication Reconciliation: complete    Have you ever seen a rheumatologist No Who NA When NA  Joint pain history  Onset: 5-6 years ago  Involved joints: legs from hip to knees, shoulders to wrists  Pain scale:  7.5/10     Wakes the patient from sleep : Yes  Morning stiffness:No  Meds used:ibuprofen    Interim history  Since last visit:  1. Infections - Yes/ cold last month. Has severe diarrhea for 2 months in may  2. New symptoms/medical problem - No  3. Any side effects from Rheum medications -NA  3. ER visits/Hospitalizations/surgeries - Yes 1.5 years ER for vaginosis and cyst on ovary, given flagyl , pt had a reaction and since the has not been the same  4. Last PCP visit: 7/31/17  Wt Readings from Last 4 Encounters:   08/04/17 86.9 kg (191 lb 9.6 oz)   07/31/17 87.1 kg (192 lb 1.6 oz)   04/27/17 86.6 kg (191 lb)   04/25/17 87.2 kg (192 lb 4.8 oz)     BP Readings from Last 3 Encounters:   08/04/17 106/70   07/31/17 118/82   04/27/17 132/83       "

## 2017-08-04 NOTE — MR AVS SNAPSHOT
"              After Visit Summary   8/4/2017    Oly Rush    MRN: [id number]           Patient Information     Date Of Birth          1975        Visit Information        Provider Department      8/4/2017 8:30 AM Atilio Wallace MD [facility]        Today's Diagnoses     Myalgia    -  1       Follow-ups after your visit        Who to contact     If you have questions or need follow up information about today's clinic visit or your schedule [facility] directly at [phone].  Normal or non-critical lab and imaging results will be communicated to you by Inhance Mediahart, letter or phone within 4 business days after the clinic has received the results. If you do not hear from us within 7 days, please contact the clinic through SmartZip Analyticst or phone. If you have a critical or abnormal lab result, we will notify you by phone as soon as possible.  Submit refill requests through Open[employer]ovis or call your pharmacy and they will forward the refill request to us. Please allow 3 business days for your refill to be completed.          Additional Information About Your Visit        MyChart Information     [employer] gives you secure access to your electronic health record. If you see a primary care provider, you can also send messages to your care team and make appointments. If you have questions, please call your primary care clinic.  If you do not have a primary care provider, please call [phone] and they will assist you.        Care EveryWhere ID     This is your Care EveryWhere ID. This could be used by other organizations to access your [place] medical records  [id number]        Your Vitals Were     Pulse Temperature Height Last Period Pulse Oximetry BMI (Body Mass Index)    87 98.2  F (36.8  C) (Oral) 1.651 m (5' 5\") 09/26/2012 98% 31.88 kg/m2       Blood Pressure from Last 3 Encounters:   08/04/17 106/70   07/31/17 118/82   04/27/17 132/83    Weight from Last 3 " Encounters:   08/04/17 86.9 kg (191 lb 9.6 oz)   07/31/17 87.1 kg (192 lb 1.6 oz)   04/27/17 86.6 kg (191 lb)              We Performed the Following     CRP inflammation     Cyclic Citrullinated Peptide Antibody IgG     Hepatitis B core antibody     Hepatitis B surface antigen     Hepatitis C antibody        Primary Care Provider Office Phone # Fax #    Carrie Peg Prasad [phone]       [facility] [address]  [address]   [facility] [address]        Equal Access to Services     [facility]: Hadii aad ku hadasho Soomaali, waaxda luqadaha, qaybta kaalmada adeegyada, waxay [place] . So [facility] [phone].    ATENCIÓN: Si habla español, tiene a zamarripa disposición servicios gratuitos de asistencia lingüística. [facility] [phone].    We comply with applicable federal civil rights laws and [place] laws. We do not discriminate on the basis of race, color, national origin, age, disability sex, sexual orientation or gender identity.            Thank you!     Thank you for choosing [facility]  for your care. Our goal is always to provide you with excellent care. Hearing back from our patients is one way we can continue to improve our services. Please take a few minutes to complete the written survey that you may receive in the mail after your visit with us. Thank you!             Your Updated Medication List - Protect others around you: Learn how to safely use, store and throw away your medicines at www.disposemymeds.org.          This list is accurate as of: 8/4/17  9:27 AM.  Always use your most recent med list.                   Brand Name Dispense Instructions for use Diagnosis    ACIDOPHILUS PROBIOTIC BLEND Caps           ascorbic acid 1000 MG Tabs    vitamin C    30 tablet    Take 1 tablet (1,000 mg) by mouth daily        cholecalciferol 5000 UNITS Tabs tablet    vitamin D3     Take by mouth daily        cyclobenzaprine 5 MG tablet    FLEXERIL     30 tablet    Take 1 tablet (5 mg) by mouth nightly as needed for muscle spasms    Myalgia and myositis       methocarbamol 500 MG tablet    ROBAXIN    60 tablet    Take 1 tablet (500 mg) by mouth 4 times daily as needed for muscle spasms    Chest wall pain       vitamin B complex with vitamin C Tabs tablet      Take 1 tablet by mouth daily

## 2017-08-07 DIAGNOSIS — R79.82 ELEVATED C-REACTIVE PROTEIN (CRP): Primary | ICD-10-CM

## 2017-08-07 LAB — CCP AB SER IA-ACNC: 1 U/ML

## 2017-08-07 NOTE — PROGRESS NOTES
Results released to [facility]:  Hepatitis B and C screening were negative.   CCP rheumatoid antibody testing is normal.   CRP inflammatory marker is slightly elevated. I recommend that CRP is repeated in 2 months. I have put the orders in. Please get them done at a nearby local [facility] lab. You may call them to set up a lab appointment.       Sincerely    Atilio Wallace MD  [facility]

## 2017-08-21 ENCOUNTER — OFFICE VISIT (OUTPATIENT)
Dept: FAMILY MEDICINE | Facility: CLINIC | Age: 42
End: 2017-08-21
Payer: COMMERCIAL

## 2017-08-21 VITALS
BODY MASS INDEX: 31.75 KG/M2 | WEIGHT: 190.8 LBS | DIASTOLIC BLOOD PRESSURE: 78 MMHG | HEART RATE: 80 BPM | TEMPERATURE: 98 F | SYSTOLIC BLOOD PRESSURE: 108 MMHG | RESPIRATION RATE: 16 BRPM

## 2017-08-21 DIAGNOSIS — M79.7 FIBROMYALGIA: Primary | ICD-10-CM

## 2017-08-21 PROCEDURE — 99213 OFFICE O/P EST LOW 20 MIN: CPT | Performed by: FAMILY MEDICINE

## 2017-08-21 ASSESSMENT — PAIN SCALES - GENERAL: PAINLEVEL: NO PAIN (0)

## 2017-08-21 NOTE — MR AVS SNAPSHOT
After Visit Summary   8/21/2017    Oly Rush    MRN: [id number]           Patient Information     Date Of Birth          1975        Visit Information        Provider Department      8/21/2017 8:00 AM Carrie Prasad MD [facility]        Today's Diagnoses     Fibromyalgia    -  1      Care Instructions    How not to Die    The Mediterranean Diet can reduce your risk of Heart Disease and Stroke    Recommended:     Olive oil         >4 Tbs/day  Tree nuts       >3 handfuls/wk, prefer once daily 1/4 cup, Almonds, Walnuts, Hazelnuts preferred   Fresh Fruits   >3/day  Vegetables     >2/day  Fish, seafood >3/week  Legumes        >3/week  White meat     Instead of red meat  Wine with meals, optional, only for those who wish to drink, up to 7 drinks per week    Discouraged; Limit the following    Soda drinks                 <1/day  Commercial baked goods, sweets, pastries  <3/week  Spread fats                 <1/day  Red meat                    <1/day  Or processed meats  <1/day     Taken from [place] Journal of Medicine Feb 2013            Follow-ups after your visit        Future tests that were ordered for you today     Open Future Orders        Priority Expected Expires Ordered    Rheumatoid factor Routine  8/21/2018 8/21/2017            Who to contact     If you have questions or need follow up information about today's clinic visit or your schedule please contact [facility] directly at [phone].  Normal or non-critical lab and imaging results will be communicated to you by MyChart, letter or phone within 4 business days after the clinic has received the results. If you do not hear from us within 7 days, please contact the clinic through MyChart or phone. If you have a critical or abnormal lab result, we will notify you by phone as soon as possible.  Submit refill requests through [facility] or call your pharmacy and they will forward the refill  request to us. Please allow 3 business days for your refill to be completed.          Additional Information About Your Visit        MyChart Information     [employer] gives you secure access to your electronic health record. If you see a primary care provider, you can also send messages to your care team and make appointments. If you have questions, please call your primary care clinic.  If you do not have a primary care provider, please call [phone] and they will assist you.        Care EveryWhere ID     This is your Care EveryWhere ID. This could be used by other organizations to access your [place] medical records  [id number]        Your Vitals Were     Pulse Temperature Respirations Last Period BMI (Body Mass Index)       80 98  F (36.7  C) (Oral) 16 09/26/2012 31.75 kg/m2        Blood Pressure from Last 3 Encounters:   08/21/17 108/78   08/04/17 106/70   07/31/17 118/82    Weight from Last 3 Encounters:   08/21/17 190 lb 12.8 oz (86.5 kg)   08/04/17 191 lb 9.6 oz (86.9 kg)   07/31/17 192 lb 1.6 oz (87.1 kg)               Primary Care Provider Office Phone # Fax #    Carrie Peg Prasad [phone]       [id number]  [address]   [facility] [id number]        Equal Access to Services     JODI MORA AH: Hadii aad ku hadasho Soomaali, waaxda luqadaha, qaybta kaalmada adeegyada, waxay idiin haylanen baldemar bay . So [facility] [phone].    ATENCIÓN: Si habla español, tiene a zamarripa disposición servicios gratuitos de asistencia lingüística. Llame al [phone].    We comply with applicable federal civil rights laws and [place] laws. We do not discriminate on the basis of race, color, national origin, age, disability sex, sexual orientation or gender identity.            Thank you!     Thank you for choosing [facility]  for your care. Our goal is always to provide you with excellent care. Hearing back from our patients is one way we can continue to improve our services.  Please take a few minutes to complete the written survey that you may receive in the mail after your visit with us. Thank you!             Your Updated Medication List - Protect others around you: Learn how to safely use, store and throw away your medicines at www.disposemymeds.org.          This list is accurate as of: 8/21/17  8:31 AM.  Always use your most recent med list.                   Brand Name Dispense Instructions for use Diagnosis    ACIDOPHILUS PROBIOTIC BLEND Caps           ascorbic acid 1000 MG Tabs    vitamin C    30 tablet    Take 1 tablet (1,000 mg) by mouth daily        cholecalciferol 5000 UNITS Tabs tablet    vitamin D3     Take by mouth daily        cyclobenzaprine 5 MG tablet    FLEXERIL    30 tablet    Take 1 tablet (5 mg) by mouth nightly as needed for muscle spasms    Myalgia and myositis       HM OMEGA-3-6-9 FATTY ACIDS PO           methocarbamol 500 MG tablet    ROBAXIN    60 tablet    Take 1 tablet (500 mg) by mouth 4 times daily as needed for muscle spasms    Chest wall pain       vitamin B complex with vitamin C Tabs tablet      Take 1 tablet by mouth daily

## 2017-08-21 NOTE — PATIENT INSTRUCTIONS
How not to Die    The Mediterranean Diet can reduce your risk of Heart Disease and Stroke    Recommended:     Olive oil         >4 Tbs/day  Tree nuts       >3 handfuls/wk, prefer once daily 1/4 cup, Almonds, Walnuts, Hazelnuts preferred   Fresh Fruits   >3/day  Vegetables     >2/day  Fish, seafood >3/week  Legumes        >3/week  White meat     Instead of red meat  Wine with meals, optional, only for those who wish to drink, up to 7 drinks per week    Discouraged; Limit the following    Soda drinks                 <1/day  Commercial baked goods, sweets, pastries  <3/week  Spread fats                 <1/day  Red meat                    <1/day  Or processed meats  <1/day     Taken from [place] Journal of Medicine Feb 2013

## 2017-08-21 NOTE — PROGRESS NOTES
HPI   SUBJECTIVE:   Oly Rush is a 42 year old female who presents to clinic today for the following health issues:    FOLLOW UP VISIT AFTER GOING TO SEE THE RHEUMATOLOGIST:      Patient saw Rheumatology in [place] 8/4/2017. She did not have a follow up appointment after the labs were preformed. Inflammation markers and white blood count were elevated. She states that her white blood count is always slightly elevated. Patient was diagnosed with fibromyalgia. She does not have Rheumatoid arthritis, but was told it could develop. Blood work will be repeated in 2 months.     Patient states that she is not surprised with the diagnosis and she expressed a desire to avoid prescription medication and continue with natural medication if possible. She states that she will take hemp oil rather than omega supplements. She tries to not take ibuprofen often. She reports aiming for 8 hours of sleep a day and exceeds in doing so for the most part. Her diet has improved but she admits that it could be better. She walks every day.     She wishes to do research on inflammation diets and inquired about medical marijuana and asked if it would be beneficial to her.     She states that her symptoms will flare up with weather and is worse in the winter. She will also experience a flare up at work because she has to lift and bend down often.     Problem list and histories reviewed & adjusted, as indicated.  Additional history: as documented    BP Readings from Last 3 Encounters:   08/21/17 108/78   08/04/17 106/70   07/31/17 118/82    Wt Readings from Last 3 Encounters:   08/21/17 86.5 kg (190 lb 12.8 oz)   08/04/17 86.9 kg (191 lb 9.6 oz)   07/31/17 87.1 kg (192 lb 1.6 oz)         Labs reviewed in EPIC    Reviewed and updated as needed this visit by clinical staffTobacco  Allergies  Meds  Problems  Med Hx  Surg Hx  Fam Hx  Soc Hx        Reviewed and updated as needed this visit by Provider         ROS:  Constitutional,  HEENT, cardiovascular, pulmonary, gi and gu systems are negative, except as otherwise noted.    This document serves as a record of the services and decisions personally performed and made by Carrie Prasad MD. It was created on her behalf by Misa Silva, a trained medical scribe. The creation of this document is based on the provider's statements to the medical scribe.  Misa Silva August 21, 2017 8:13 AM     OBJECTIVE:     /78 (BP Location: Right arm, Patient Position: Chair, Cuff Size: Adult Regular)  Pulse 80  Temp 98  F (36.7  C) (Oral)  Resp 16  Wt 86.5 kg (190 lb 12.8 oz)  LMP 09/26/2012  BMI 31.75 kg/m2  Body mass index is 31.75 kg/(m^2).    GENERAL: healthy, alert and no distress  MS: no gross musculoskeletal defects noted, no edema  SKIN: no suspicious lesions or rashes  NEURO: Normal strength and tone, mentation intact and speech normal  PSYCH: mentation appears normal, affect normal/bright        ASSESSMENT/PLAN:   (M79.7) Fibromyalgia  (primary encounter diagnosis)  Comment: I recommended that the patient improve diet, exercise and sleep to help improve symptoms. I recommended the Mediterranean diet and the book, How not to Die. I discussed the process of using medical marijuana. Patient stated that she will let me know if she wishes to proceed with medical marijuana. She does have a qualifying condition  Ok to use her muscle relaxants from when her back went out  Plan: Rheumatoid factor       Work on weight loss  Regular exercise-do not over do, aim for daily exercise, slowing increase intensity    The information in this document, created by the medical scribe for me, accurately reflects the services I personally performed and the decisions made by me. I have reviewed and approved this document for accuracy prior to leaving the patient care area.  August 21, 2017 8:13 AM    Carrie Prasad MD  [facility]      Physical Exam

## 2017-08-21 NOTE — NURSING NOTE
"Chief Complaint   Patient presents with     RECHECK     F/U visit after going to the RHEUMATOLOGIST.      Initial /78 (BP Location: Right arm, Patient Position: Chair, Cuff Size: Adult Regular)  Pulse 80  Temp 98  F (36.7  C) (Oral)  Resp 16  Wt 190 lb 12.8 oz (86.5 kg)  LMP 09/26/2012  BMI 31.75 kg/m2 Estimated body mass index is 31.75 kg/(m^2) as calculated from the following:    Height as of 8/4/17: 5' 5\" (1.651 m).    Weight as of this encounter: 190 lb 12.8 oz (86.5 kg).  BP completed using cuff size regular RIGHT arm.    Lisa Magill, CMA    "

## 2018-01-08 ENCOUNTER — OFFICE VISIT (OUTPATIENT)
Dept: FAMILY MEDICINE | Facility: CLINIC | Age: 43
End: 2018-01-08
Payer: COMMERCIAL

## 2018-01-08 VITALS
DIASTOLIC BLOOD PRESSURE: 78 MMHG | WEIGHT: 195 LBS | HEART RATE: 91 BPM | SYSTOLIC BLOOD PRESSURE: 118 MMHG | RESPIRATION RATE: 14 BRPM | TEMPERATURE: 99.8 F | OXYGEN SATURATION: 97 % | BODY MASS INDEX: 32.45 KG/M2

## 2018-01-08 DIAGNOSIS — J20.9 ACUTE BRONCHITIS WITH SYMPTOMS > 10 DAYS: Primary | ICD-10-CM

## 2018-01-08 PROCEDURE — 99213 OFFICE O/P EST LOW 20 MIN: CPT | Performed by: PHYSICIAN ASSISTANT

## 2018-01-08 RX ORDER — AZITHROMYCIN 250 MG/1
TABLET, FILM COATED ORAL
Qty: 6 TABLET | Refills: 0 | Status: SHIPPED | OUTPATIENT
Start: 2018-01-08 | End: 2018-05-01

## 2018-01-08 NOTE — MR AVS SNAPSHOT
After Visit Summary   1/8/2018    Oly Rush    MRN: [id number]           Patient Information     Date Of Birth          1975        Visit Information        Provider Department      1/8/2018 10:40 AM Jarret Zepeda PA-C [facility]        Today's Diagnoses     Acute bronchitis with symptoms > 10 days    -  1       Follow-ups after your visit        Follow-up notes from your care team     Return if symptoms worsen or fail to improve.      Who to contact     If you have questions or need follow up information about today's clinic visit or your schedule please contact [facility] directly at [phone].  Normal or non-critical lab and imaging results will be communicated to you by MyChart, letter or phone within 4 business days after the clinic has received the results. If you do not hear from us within 7 days, please contact the clinic through [employer] or phone. If you have a critical or abnormal lab result, we will notify you by phone as soon as possible.  Submit refill requests through [employer] or call your pharmacy and they will forward the refill request to us. Please allow 3 business days for your refill to be completed.          Additional Information About Your Visit        MyChart Information     [employer] gives you secure access to your electronic health record. If you see a primary care provider, you can also send messages to your care team and make appointments. If you have questions, please call your primary care clinic.  If you do not have a primary care provider, please call [phone] and they will assist you.        Care EveryWhere ID     This is your Care EveryWhere ID. This could be used by other organizations to access your [place] medical records  [id number]        Your Vitals Were     Pulse Temperature Respirations Last Period Pulse Oximetry BMI (Body Mass Index)    91 99.8  F (37.7  C) (Oral) 14 09/26/2012 97% 32.45 kg/m2        Blood Pressure from Last 3 Encounters:   01/08/18 118/78   08/21/17 108/78   08/04/17 106/70    Weight from Last 3 Encounters:   01/08/18 195 lb (88.5 kg)   08/21/17 190 lb 12.8 oz (86.5 kg)   08/04/17 191 lb 9.6 oz (86.9 kg)              Today, you had the following     No orders found for display         Today's Medication Changes          These changes are accurate as of: 1/8/18 11:18 AM.  If you have any questions, ask your nurse or doctor.               Start taking these medicines.        Dose/Directions    azithromycin 250 MG tablet   Commonly known as:  ZITHROMAX   Used for:  Acute bronchitis with symptoms > 10 days   Started by:  Jarret Zepeda PA-C        Two tablets first day, then one tablet daily for four days.   Quantity:  6 tablet   Refills:  0            Where to get your medicines      These medications were sent to [facility] [address] [place] [id number]    Hours:  24-hours Phone:  [phone]     azithromycin 250 MG tablet                Primary Care Provider Office Phone # Fax #    Carrie Peg Prasad [phone]  [facility]   [facility] [address]        Equal Access to Services     JODI MORA AH: Hadii aad ku hadasho Soomaali, waaxda luqadaha, qaybta kaalmada adeegyada, luz marina otero. So [facility] [phone].    ATENCIÓN: Si habla español, tiene a zamarripa disposición servicios gratuitos de asistencia lingüística. Juan zapata [phone].    We comply with applicable federal civil rights laws and [place] laws. We do not discriminate on the basis of race, color, national origin, age, disability, sex, sexual orientation, or gender identity.            Thank you!     Thank you for choosing [facility]  for your care. Our goal is always to provide you with excellent care. Hearing back from our patients is one way we can  continue to improve our services. Please take a few minutes to complete the written survey that you may receive in the mail after your visit with us. Thank you!             Your Updated Medication List - Protect others around you: Learn how to safely use, store and throw away your medicines at www.disposemymeds.org.          This list is accurate as of: 1/8/18 11:18 AM.  Always use your most recent med list.                   Brand Name Dispense Instructions for use Diagnosis    ACIDOPHILUS PROBIOTIC BLEND Caps           ascorbic acid 1000 MG Tabs    vitamin C    30 tablet    Take 1 tablet (1,000 mg) by mouth daily        azithromycin 250 MG tablet    ZITHROMAX    6 tablet    Two tablets first day, then one tablet daily for four days.    Acute bronchitis with symptoms > 10 days       cholecalciferol 5000 UNITS Tabs tablet    vitamin D3     Take by mouth daily        cyclobenzaprine 5 MG tablet    FLEXERIL    30 tablet    Take 1 tablet (5 mg) by mouth nightly as needed for muscle spasms    Myalgia and myositis       HM OMEGA-3-6-9 FATTY ACIDS PO           MAGNESIUM MALATE PO      Take 650 mg by mouth        methocarbamol 500 MG tablet    ROBAXIN    60 tablet    Take 1 tablet (500 mg) by mouth 4 times daily as needed for muscle spasms    Chest wall pain       vitamin B complex with vitamin C Tabs tablet      Take 1 tablet by mouth daily

## 2018-03-22 ENCOUNTER — HOSPITAL ENCOUNTER (EMERGENCY)
Facility: CLINIC | Age: 43
Discharge: HOME | End: 2018-03-22
Attending: EMERGENCY MEDICINE | Admitting: EMERGENCY MEDICINE
Payer: COMMERCIAL

## 2018-03-22 ENCOUNTER — APPOINTMENT (OUTPATIENT)
Dept: ULTRASOUND IMAGING | Facility: CLINIC | Age: 43
End: 2018-03-22
Attending: EMERGENCY MEDICINE
Payer: COMMERCIAL

## 2018-03-22 ENCOUNTER — APPOINTMENT (OUTPATIENT)
Dept: GENERAL RADIOLOGY | Facility: CLINIC | Age: 43
End: 2018-03-22
Attending: EMERGENCY MEDICINE
Payer: COMMERCIAL

## 2018-03-22 VITALS
HEART RATE: 79 BPM | OXYGEN SATURATION: 100 % | DIASTOLIC BLOOD PRESSURE: 67 MMHG | SYSTOLIC BLOOD PRESSURE: 116 MMHG | RESPIRATION RATE: 1 BRPM | TEMPERATURE: 98.1 F

## 2018-03-22 DIAGNOSIS — R07.9 CHEST PAIN, UNSPECIFIED TYPE: ICD-10-CM

## 2018-03-22 DIAGNOSIS — R06.00 DYSPNEA, UNSPECIFIED TYPE: ICD-10-CM

## 2018-03-22 LAB
ANION GAP SERPL CALCULATED.3IONS-SCNC: 8 MMOL/L (ref 3–14)
APTT PPP: 29 SEC (ref 22–37)
BASOPHILS # BLD AUTO: 0.1 10E9/L (ref 0–0.2)
BASOPHILS NFR BLD AUTO: 0.5 %
BUN SERPL-MCNC: 12 MG/DL (ref 7–30)
CALCIUM SERPL-MCNC: 9.4 MG/DL (ref 8.5–10.1)
CHLORIDE SERPL-SCNC: 106 MMOL/L (ref 94–109)
CO2 SERPL-SCNC: 24 MMOL/L (ref 20–32)
CREAT SERPL-MCNC: 0.73 MG/DL (ref 0.52–1.04)
D DIMER PPP FEU-MCNC: 0.3 UG/ML FEU (ref 0–0.5)
DIFFERENTIAL METHOD BLD: ABNORMAL
EOSINOPHIL # BLD AUTO: 0.2 10E9/L (ref 0–0.7)
EOSINOPHIL NFR BLD AUTO: 1.5 %
ERYTHROCYTE [DISTWIDTH] IN BLOOD BY AUTOMATED COUNT: 12.3 % (ref 10–15)
FLUAV+FLUBV AG SPEC QL: NEGATIVE
FLUAV+FLUBV AG SPEC QL: NEGATIVE
GFR SERPL CREATININE-BSD FRML MDRD: 87 ML/MIN/1.7M2
GLUCOSE SERPL-MCNC: 100 MG/DL (ref 70–99)
HCG UR QL: NEGATIVE
HCT VFR BLD AUTO: 41.7 % (ref 35–47)
HGB BLD-MCNC: 14.1 G/DL (ref 11.7–15.7)
IMM GRANULOCYTES # BLD: 0.1 10E9/L (ref 0–0.4)
IMM GRANULOCYTES NFR BLD: 0.3 %
INR PPP: 0.91 (ref 0.86–1.14)
LYMPHOCYTES # BLD AUTO: 2.9 10E9/L (ref 0.8–5.3)
LYMPHOCYTES NFR BLD AUTO: 19.9 %
MCH RBC QN AUTO: 29.8 PG (ref 26.5–33)
MCHC RBC AUTO-ENTMCNC: 33.8 G/DL (ref 31.5–36.5)
MCV RBC AUTO: 88 FL (ref 78–100)
MONOCYTES # BLD AUTO: 0.9 10E9/L (ref 0–1.3)
MONOCYTES NFR BLD AUTO: 5.9 %
NEUTROPHILS # BLD AUTO: 10.4 10E9/L (ref 1.6–8.3)
NEUTROPHILS NFR BLD AUTO: 71.9 %
NRBC # BLD AUTO: 0 10*3/UL
NRBC BLD AUTO-RTO: 0 /100
PLATELET # BLD AUTO: 363 10E9/L (ref 150–450)
POTASSIUM SERPL-SCNC: 3.5 MMOL/L (ref 3.4–5.3)
RBC # BLD AUTO: 4.73 10E12/L (ref 3.8–5.2)
SODIUM SERPL-SCNC: 138 MMOL/L (ref 133–144)
SPECIMEN SOURCE: NORMAL
TROPONIN I SERPL-MCNC: <0.015 UG/L (ref 0–0.04)
WBC # BLD AUTO: 14.5 10E9/L (ref 4–11)

## 2018-03-22 PROCEDURE — 96375 TX/PRO/DX INJ NEW DRUG ADDON: CPT

## 2018-03-22 PROCEDURE — 99285 EMERGENCY DEPT VISIT HI MDM: CPT | Mod: 25

## 2018-03-22 PROCEDURE — 71046 X-RAY EXAM CHEST 2 VIEWS: CPT

## 2018-03-22 PROCEDURE — 87804 INFLUENZA ASSAY W/OPTIC: CPT | Performed by: EMERGENCY MEDICINE

## 2018-03-22 PROCEDURE — 85610 PROTHROMBIN TIME: CPT | Performed by: EMERGENCY MEDICINE

## 2018-03-22 PROCEDURE — 25000132 ZZH RX MED GY IP 250 OP 250 PS 637: Performed by: EMERGENCY MEDICINE

## 2018-03-22 PROCEDURE — 93971 EXTREMITY STUDY: CPT | Mod: LT

## 2018-03-22 PROCEDURE — 25000128 H RX IP 250 OP 636: Performed by: EMERGENCY MEDICINE

## 2018-03-22 PROCEDURE — 80048 BASIC METABOLIC PNL TOTAL CA: CPT | Performed by: EMERGENCY MEDICINE

## 2018-03-22 PROCEDURE — 96374 THER/PROPH/DIAG INJ IV PUSH: CPT

## 2018-03-22 PROCEDURE — 93005 ELECTROCARDIOGRAM TRACING: CPT

## 2018-03-22 PROCEDURE — 85025 COMPLETE CBC W/AUTO DIFF WBC: CPT | Performed by: EMERGENCY MEDICINE

## 2018-03-22 PROCEDURE — 84484 ASSAY OF TROPONIN QUANT: CPT | Performed by: EMERGENCY MEDICINE

## 2018-03-22 PROCEDURE — 85379 FIBRIN DEGRADATION QUANT: CPT | Performed by: EMERGENCY MEDICINE

## 2018-03-22 PROCEDURE — 81025 URINE PREGNANCY TEST: CPT | Performed by: EMERGENCY MEDICINE

## 2018-03-22 PROCEDURE — 85730 THROMBOPLASTIN TIME PARTIAL: CPT | Performed by: EMERGENCY MEDICINE

## 2018-03-22 RX ORDER — MORPHINE SULFATE 4 MG/ML
4 INJECTION, SOLUTION INTRAMUSCULAR; INTRAVENOUS ONCE
Status: COMPLETED | OUTPATIENT
Start: 2018-03-22 | End: 2018-03-22

## 2018-03-22 RX ORDER — ONDANSETRON 2 MG/ML
INJECTION INTRAMUSCULAR; INTRAVENOUS
Status: DISCONTINUED
Start: 2018-03-22 | End: 2018-03-23 | Stop reason: HOSPADM

## 2018-03-22 RX ORDER — ONDANSETRON 2 MG/ML
4 INJECTION INTRAMUSCULAR; INTRAVENOUS ONCE
Status: COMPLETED | OUTPATIENT
Start: 2018-03-22 | End: 2018-03-22

## 2018-03-22 RX ORDER — METOCLOPRAMIDE HYDROCHLORIDE 5 MG/ML
10 INJECTION INTRAMUSCULAR; INTRAVENOUS ONCE
Status: COMPLETED | OUTPATIENT
Start: 2018-03-22 | End: 2018-03-22

## 2018-03-22 RX ORDER — ASPIRIN 81 MG/1
324 TABLET, CHEWABLE ORAL ONCE
Status: COMPLETED | OUTPATIENT
Start: 2018-03-22 | End: 2018-03-22

## 2018-03-22 RX ADMIN — ASPIRIN 81 MG 324 MG: 81 TABLET ORAL at 20:29

## 2018-03-22 RX ADMIN — METOCLOPRAMIDE 10 MG: 5 INJECTION, SOLUTION INTRAMUSCULAR; INTRAVENOUS at 21:35

## 2018-03-22 RX ADMIN — ONDANSETRON 4 MG: 2 INJECTION INTRAMUSCULAR; INTRAVENOUS at 20:32

## 2018-03-22 RX ADMIN — MORPHINE SULFATE 4 MG: 4 INJECTION INTRAVENOUS at 20:29

## 2018-03-22 ASSESSMENT — ENCOUNTER SYMPTOMS
WEAKNESS: 0
COLOR CHANGE: 0
NAUSEA: 1
SHORTNESS OF BREATH: 1
MYALGIAS: 1
CHILLS: 0
FEVER: 0

## 2018-03-22 NOTE — ED AVS SNAPSHOT
[facility] Emergency Department    [address]    [facility] [address]    Phone:  [phone]    Fax:  [phone]                                       Oly Rush   MRN: [id number]    Department:  [facility] Emergency Department   Date of Visit:  3/22/2018           Patient Information     Date Of Birth          1975        Your diagnoses for this visit were:     Chest pain, unspecified type     Dyspnea, unspecified type        You were seen by Billy Durham DO.      Follow-up Information     Follow up with Carrie Prasad MD. Call in 2 days.    Specialty:  Family Practice    Why:  As needed    Contact information:    [address]   [facility] [address]  [phone]          Follow up with [facility] Emergency Department.    Specialty:  EMERGENCY MEDICINE    Why:  If symptoms worsen    Contact information:    [address]  [facility] [id number] [phone]        Discharge Instructions          *CHEST PAIN, UNCERTAIN CAUSE    Based on your exam today, the exact cause of your chest pain is not certain. Your condition does not seem serious at this time, and your pain does not appear to be coming from your heart. However, sometimes the signs of a serious problem take more time to appear. Therefore, watch for the warning signs listed below.  HOME CARE:  1. Rest today and avoid strenuous activity.  2. Take any prescribed medicine as directed.  FOLLOW UP with your doctor in 1-3 days.   GET PROMPT MEDICAL ATTENTION if any of the following occur:    A change in the type of pain: if it feels different, becomes more severe, lasts longer, or begins to spread into your shoulder, arm, neck, jaw or back    Shortness of breath or increased pain with breathing    Weakness, dizziness, or fainting    Cough with blood or dark colored sputum (phlegm)    Fever over 101  F (38.3  C)    Swelling, pain or redness in one  leg    [id number] [employer]. [address] [place] [address], [place] [address]. All rights reserved. This information is not intended as a substitute for professional medical care. Always follow your healthcare professional's instructions.  This information has been modified by your health care provider with permission from the publisher.      Shortness of Breath (Dyspnea)  Shortness of breath is the feeling that you can't catch your breath or get enough air. It is also known as dyspnea.  Dyspnea can be caused by many different conditions. They include:    Acute asthma attack.    Worsening of chronic lung diseases such as chronic bronchitis and emphysema.    Heart failure. This is when weak heart muscle allows extra fluid to collect in the lungs.    Panic attacks or anxiety. Fear can cause rapid breathing (hyperventilation).    Pneumonia, or an infection in the lung tissue.    Exposure to toxic substances, fumes, smoke, or certain medicines.    Blood clot in the lung (pulmonary embolism). This is often from a piece of blood clot in a deep vein of the leg (deep vein thrombosis) that breaks off and travels to the lungs.    Heart attack or heart-related chest pain (angina).    Anemia.    Collapsed lung (pneumothorax).    Dehydration.    Pregnancy.  Based on your visit today, the exact cause of your shortness of breath is not certain. Your tests don t show any of the serious causes of dyspnea. You may need other tests to find out if you have a serious problem. It s important to watch for any new symptoms or symptoms that get worse. Follow up with your healthcare provider as directed.  Home care  Follow these tips to take care of yourself at home:    When your symptoms are better, go back to your usual activities.    If you smoke, you should stop. Join a quit-smoking program or ask your healthcare provider for help.    Eat a healthy diet and get plenty of sleep.    Get regular exercise. Talk with your healthcare  provider before starting to exercise, especially if you have other medical problems.    Cut down on the amount of caffeine and stimulants you consume.  Follow-up care  Follow up with your healthcare provider, or as advised.  If tests were done, you will be told if your treatment needs to be changed. You can call as directed for the results.  (Note: If an X-ray was taken, a specialist will review it. You will be notified of any new findings that may affect your care.)  Call 911 or get immediate medical care  Shortness of breath may be a sign of a serious medical problem. For example, it may be a problem with your heart or lungs. Call 911 if you have worsening shortness of breath or trouble breathing, especially with any of the symptoms below:    You are confused or it s difficult to wake you.    You faint or lose consciousness.    You have a fast heartbeat, or your heartbeat is irregular.    You are coughing up blood.    You have pain in your chest, arm, shoulder, neck, or upper back.    You break out in a sweat.  When to seek medical advice  Call your healthcare provider right away if any of these occur:    Slight shortness of breath or wheezing    Redness, pain or swelling in your leg, arm, or other body area    Swelling in both legs or ankles    Fast weight gain    Dizziness or weakness    Fever of 100.4 F (38 C) or higher, or as directed by your healthcare provider  Date Last Reviewed: 9/13/2015 2000-2017 [employer]. [address] [place] [address]. All rights reserved. This information is not intended as a substitute for professional medical care. Always follow your healthcare professional's instructions.          24 Hour Appointment Hotline       To make an appointment at any [facility], call [phone] ([phone]). If you don't have a family doctor or clinic, we will help you find one. [facility] clinics are conveniently located to serve the needs of you and your  family.             Review of your medicines      Our records show that you are taking the medicines listed below. If these are incorrect, please call your family doctor or clinic.        Dose / Directions Last dose taken    ACIDOPHILUS PROBIOTIC BLEND Caps        Refills:  0        ascorbic acid 1000 MG Tabs   Commonly known as:  vitamin C   Dose:  1000 mg   Quantity:  30 tablet        Take 1 tablet (1,000 mg) by mouth daily   Refills:  0        azithromycin 250 MG tablet   Commonly known as:  ZITHROMAX   Quantity:  6 tablet        Two tablets first day, then one tablet daily for four days.   Refills:  0        cholecalciferol 5000 UNITS Tabs tablet   Commonly known as:  vitamin D3        Take by mouth daily   Refills:  0        cyclobenzaprine 5 MG tablet   Commonly known as:  FLEXERIL   Dose:  5 mg   Quantity:  30 tablet        Take 1 tablet (5 mg) by mouth nightly as needed for muscle spasms   Refills:  3        HM OMEGA-3-6-9 FATTY ACIDS PO        Refills:  0        MAGNESIUM MALATE PO   Dose:  650 mg        Take 650 mg by mouth   Refills:  0        methocarbamol 500 MG tablet   Commonly known as:  ROBAXIN   Dose:  500 mg   Quantity:  60 tablet        Take 1 tablet (500 mg) by mouth 4 times daily as needed for muscle spasms   Refills:  0        vitamin B complex with vitamin C Tabs tablet   Dose:  1 tablet        Take 1 tablet by mouth daily   Refills:  0                Procedures and tests performed during your visit     Basic metabolic panel    CBC with platelets differential    Cardiac Continuous Monitoring    Chest XR,  PA & LAT    D dimer quantitative    EKG 12-lead, tracing only    HCG qualitative urine (UPT)    INR    Influenza A/B antigen    Partial thromboplastin time    Peripheral IV: Standard    Pulse oximetry nursing    Review medications with patient    Troponin I    US Lower Extremity Venous Duplex Left      Orders Needing Specimen Collection     None      Pending Results     Date and Time Order  Name Status Description    3/22/2018 2126 Influenza A/B antigen In process     3/22/2018 2029 Chest XR,  PA & LAT Preliminary     3/22/2018 1944 EKG 12-lead, tracing only Preliminary             Pending Culture Results     Date and Time Order Name Status Description    3/22/2018 2126 Influenza A/B antigen In process             Pending Results Instructions     If you had any lab results that were not finalized at the time of your Discharge, you can call the ED Lab Result RN at [phone]. You will be contacted by this team for any positive Lab results or changes in treatment. The nurses are available 7 days a week from 10A to 6:30P.  You can leave a message 24 hours per day and they will return your call.        Test Results From Your Hospital Stay        3/22/2018  8:10 PM      Component Results     Component Value Ref Range & Units Status    WBC 14.5 (H) 4.0 - 11.0 10e9/L Final    RBC Count 4.73 3.8 - 5.2 10e12/L Final    Hemoglobin 14.1 11.7 - 15.7 g/dL Final    Hematocrit 41.7 35.0 - 47.0 % Final    MCV 88 78 - 100 fl Final    MCH 29.8 26.5 - 33.0 pg Final    MCHC 33.8 31.5 - 36.5 g/dL Final    RDW 12.3 10.0 - 15.0 % Final    Platelet Count 363 150 - 450 10e9/L Final    Diff Method Automated Method  Final    % Neutrophils 71.9 % Final    % Lymphocytes 19.9 % Final    % Monocytes 5.9 % Final    % Eosinophils 1.5 % Final    % Basophils 0.5 % Final    % Immature Granulocytes 0.3 % Final    Nucleated RBCs 0 0 /100 Final    Absolute Neutrophil 10.4 (H) 1.6 - 8.3 10e9/L Final    Absolute Lymphocytes 2.9 0.8 - 5.3 10e9/L Final    Absolute Monocytes 0.9 0.0 - 1.3 10e9/L Final    Absolute Eosinophils 0.2 0.0 - 0.7 10e9/L Final    Absolute Basophils 0.1 0.0 - 0.2 10e9/L Final    Abs Immature Granulocytes 0.1 0 - 0.4 10e9/L Final    Absolute Nucleated RBC 0.0  Final         3/22/2018  8:26 PM      Component Results     Component Value Ref Range & Units Status    Sodium 138 133 - 144 mmol/L Final    Potassium 3.5 3.4 -  5.3 mmol/L Final    Chloride 106 94 - 109 mmol/L Final    Carbon Dioxide 24 20 - 32 mmol/L Final    Anion Gap 8 3 - 14 mmol/L Final    Glucose 100 (H) 70 - 99 mg/dL Final    Urea Nitrogen 12 7 - 30 mg/dL Final    Creatinine 0.73 0.52 - 1.04 mg/dL Final    GFR Estimate 87 >60 mL/min/1.7m2 Final    Non  GFR Calc    GFR Estimate If Black >90 >60 mL/min/1.7m2 Final    African American GFR Calc    Calcium 9.4 8.5 - 10.1 mg/dL Final         3/22/2018  8:26 PM      Component Results     Component Value Ref Range & Units Status    Troponin I ES <0.015 0.000 - 0.045 ug/L Final    The 99th percentile for upper reference range is 0.045 ug/L.  Troponin values   in the range of 0.045 - 0.120 ug/L may be associated with risks of adverse   clinical events.           3/22/2018  8:20 PM      Component Results     Component Value Ref Range & Units Status    D Dimer 0.3 0.0 - 0.50 ug/ml FEU Final    This D-dimer assay is intended for use in conjunction with a clinical pretest   probability assessment model to exclude pulmonary embolism (PE) and deep   venous thrombosis (DVT) in outpatients suspected of PE or DVT. The cut-off   value is 0.5 ug/mL FEU.           3/22/2018  8:45 PM      Narrative     ULTRASOUND VENOUS LEFT LOWER EXTREMITY WITH DOPPLER   3/22/2018 8:25  PM     HISTORY: Recent car trip and plane ride. Popliteal pain.    COMPARISON: None.    TECHNIQUE: Spectral waveform and color Doppler evaluation were  performed.    FINDINGS: Normal compressibility of the left common femoral, femoral,  popliteal, posterior tibial, peroneal and greater saphenous veins.  Unremarkable Doppler waveform evaluation of the left common femoral,  femoral and popliteal veins.        Impression     IMPRESSION: No evidence of thrombus in the major veins of the left  lower extremity.     TABITHA FLETCHER MD         3/22/2018  8:20 PM      Component Results     Component Value Ref Range & Units Status    INR 0.91 0.86 - 1.14 Final          3/22/2018  8:20 PM      Component Results     Component Value Ref Range & Units Status    PTT 29 22 - 37 sec Final         3/22/2018  8:56 PM      Component Results     Component Value Ref Range & Units Status    HCG Qual Urine Negative NEG^Negative Final    This test is for screening purposes.  Results should be interpreted along with   the clinical picture.  Confirmation testing is available if warranted by   ordering KBR001, HCG Quantitative Pregnancy.           3/22/2018  9:06 PM      Narrative     CHEST TWO VIEWS  3/22/2018  8:57 PM     COMPARISON: Two view chest x-ray 3/24/2006.    HISTORY: Dyspnea, chest pain.    FINDINGS: The cardiac silhouette, pulmonary vasculature, lungs and  pleural spaces are within normal limits.        Impression     IMPRESSION: Clear lungs.         3/22/2018  9:51 PM                Clinical Quality Measure: Blood Pressure Screening     Your blood pressure was checked while you were in the emergency department today. The last reading we obtained was  BP: 113/66 . Please read the guidelines below about what these numbers mean and what you should do about them.  If your systolic blood pressure (the top number) is less than 120 and your diastolic blood pressure (the bottom number) is less than 80, then your blood pressure is normal. There is nothing more that you need to do about it.  If your systolic blood pressure (the top number) is 120-139 or your diastolic blood pressure (the bottom number) is 80-89, your blood pressure may be higher than it should be. You should have your blood pressure rechecked within a year by a primary care provider.  If your systolic blood pressure (the top number) is 140 or greater or your diastolic blood pressure (the bottom number) is 90 or greater, you may have high blood pressure. High blood pressure is treatable, but if left untreated over time it can put you at risk for heart attack, stroke, or kidney failure. You should have your blood pressure  rechecked by a primary care provider within the next 4 weeks.  If your provider in the emergency department today gave you specific instructions to follow-up with your doctor or provider even sooner than that, you should follow that instruction and not wait for up to 4 weeks for your follow-up visit.        Thank you for choosing [facility]       Thank you for choosing [facility] for your care. Our goal is always to provide you with excellent care. Hearing back from our patients is one way we can continue to improve our services. Please take a few minutes to complete the written survey that you may receive in the mail after you visit with us. Thank you!        [employer] Information     [employer] gives you secure access to your electronic health record. If you see a primary care provider, you can also send messages to your care team and make appointments. If you have questions, please call your primary care clinic.  If you do not have a primary care provider, please call [phone] and they will assist you.        Care EveryWhere ID     This is your Care EveryWhere ID. This could be used by other organizations to access your [facility] medical records  [id number]        Equal Access to Services     JODI MORA : Hadii katty Pryor, waaugusto hdz, qaybwing kaals[place], luz [place] . So [facility] [phone].    ATENCIÓN: Si habla español, tiene a zamarripa disposición servicios gratuitos de asistencia lingüística. Llame al [phone].    We comply with applicable federal civil rights laws and [place] laws. We do not discriminate on the basis of race, color, national origin, age, disability, sex, sexual orientation, or gender identity.            After Visit Summary       This is your record. Keep this with you and show to your community pharmacist(s) and doctor(s) at your next visit.

## 2018-03-22 NOTE — ED AVS SNAPSHOT
[facility] Emergency Department    [address]    [facility] [address]    Phone:  [phone]    Fax:  [phone]                                       Oly Rush   MRN: [id number]    Department:  [facility] Emergency Department   Date of Visit:  3/22/2018           After Visit Summary Signature Page     I have received my discharge instructions, and my questions have been answered. I have discussed any challenges I see with this plan with the nurse or doctor.    ..........................................................................................................................................  Patient/Patient Representative Signature      ..........................................................................................................................................  Patient Representative Print Name and Relationship to Patient    ..................................................               ................................................  Date                                            Time    ..........................................................................................................................................  Reviewed by Signature/Title    ...................................................              ..............................................  Date                                                            Time

## 2018-03-23 LAB — INTERPRETATION ECG - MUSE: NORMAL

## 2018-03-23 NOTE — ED PROVIDER NOTES
History     Chief Complaint:  Chest Pain    The history is provided by the patient.      Oly Rush is a 42 year old female who presents with chest pain. The patient reports onset of chest pain 7 days ago that has evolved into shortness of breath that is worse while she is active, and was most intense last night. The patient notes that her chest pain increases while laying down, and that she has felt somewhat nauseated today. Her partner adds that the patient was complaining that she could not breath en route to the emergency department. Of note, the patient drove to [place], [place], and flew back, 3 weeks ago. Patient does state that she has had a heart murmer in the last couple years. Patient has a prescription for medical marijuana on account of her fibromyalgia.    Patient additionally endorses left leg pain behind the knee and general numbness, in addition to left arm numbness. She reports that he left leg numbness has been chronic following an allergic reaction to Flagyl, however that its associated symptoms are new. Patient denies recent injury to the left leg, leg swelling, or other complaint.    CARDIAC RISK FACTORS:  Sex:    Female  Tobacco:   Negative  Hypertension:   Negative  Hyperlipidemia:  Negative  Diabetes:   Negative  Family History:  Heart attack (grandparents, age 70)     Congestive heart failure (aunt, age 50)    PE/DVT RISK FACTORS:  Tobacco:   Negative  Cancer:   Positive (cervical)  Travel:   Positive (drove to, flew back from [place] - 3 weeks ago)  Surgery:   Negative  Other immobilization: Negative    Allergies:  Flagyl [Metronidazole]      Medications:    Magnesium malate  Zithromax  Fatty acids  Vitamin C, B, D3  Robaxin  Flexeril    Past Medical History:    Fibromyalgia  Cervical cancer  Migraine  Anxiety  Abnormal pap smear    Past Surgical History:      Da Seven hysterectomy    Family History:    Heart attack (grandparents, age 70)  Congestive heart failure (aunt,  age 50)    Social History:  Presents with partner   Tobacco use: Never smoker  Alcohol use: Yes (socially)  PCP: Carrie Prasad    Marital Status:       Review of Systems   Constitutional: Negative for chills and fever.   Respiratory: Positive for shortness of breath.    Cardiovascular: Positive for chest pain. Negative for leg swelling.   Gastrointestinal: Positive for nausea.   Musculoskeletal: Positive for myalgias (left popliteal fossa).   Skin: Negative for color change.   Neurological: Negative for weakness.   All other systems reviewed and are negative.    Physical Exam     Patient Vitals for the past 24 hrs:   BP Temp Pulse Heart Rate Resp SpO2   03/22/18 2200 116/67 - - 61 - 100 %   03/22/18 2145 113/66 - - 61 - 100 %   03/22/18 2057 - - - 78 (!) 1 100 %   03/22/18 2045 118/81 - - 62 - 99 %   03/22/18 2030 109/71 - - - - 100 %   03/22/18 1943 (!) 131/104 98.1  F (36.7  C) 79 - 24 100 %        Physical Exam  Constitutional: Patient appears well-developed and well-nourished. There is mild distress.   Head: No external signs of trauma noted.  Neck: No JVD noted  Eyes: Pupils are equal, round, and reactive to light.   Cardiovascular: Normal rate, regular rhythm and normal heart sounds.  Exam reveals no gallop and no friction rub.  No murmur heard. Equal B/L peripheral pulses in the B/L UE and LE.  Pulmonary/Chest: Effort normal and breath sounds normal. No respiratory distress. Patient has no wheezes. Patient has no rales.   Abdominal: Soft. There is no tenderness.   Extremities: No edema noted. Palpation of the left popliteal fossa elicits pain.  Neurological: Patient is alert and oriented to person, place, and time.   Skin: Skin is warm and dry. There is no diaphoresis noted. No cyanosis or pallor noted.    Emergency Department Course   ECG (19:42:31):  Rate 70 bpm. TN interval 194. QRS duration 80. QT/QTc 404/436. P-R-T axes 63 3 28. Sinus rhythm with marked sinus arrhythmia. Otherwise normal  ECG. Agree with computer interpretation.  Interpreted at 1945 by Billy Durham DO.     Imaging:  Radiographic findings were communicated with the patient who voiced understanding of the findings.    US Lower Extremity Venous Duplex left:  IMPRESSION: No evidence of thrombus in the major veins of the left lower extremity.     XR Chest, 2 views:  IMPRESSION: Clear lungs.    Imaging independently reviewed and agree with radiologist interpretation.     Laboratory:  CBC: WBC 14.5 (H) o/w WNL (HGB 14.1, )  BMP:  (H) o/w WNL (Creatinine 0.73)  1951: Troponin: <0.015    D-dimer:<0.3   INR: 0.91  PTT: 29    HCG Qualitative Urine: Negative     Interventions:  2029: Aspirin 324 mg PO   2029: Morphine 4 mg IV    2032: Zofran 4 mg IV  2135: Reglan 10 mg IV   The patient's symptoms were improved with parenteral narcotics.    Emergency Department Course:  Past medical records, nursing notes, and vitals reviewed.  1938: I performed an exam of the patient and obtained history, as documented above.     Above interventions provided.  The patient was sent for an ultrasound and chest x-ray while in the emergency department, findings above.   Blood drawn. This was sent to the lab for further testing, results above.  EKG was taken here in the ED, results as above.    2206: I rechecked the patient. Findings and plan explained to the Patient and spouse. Patient discharged home with instructions regarding supportive care, medications, and reasons to return. The importance of close follow-up was reviewed.      Impression & Plan      Medical Decision Making:  Oly Rush is a 42 year old female presented to the Emergency Department with a complaint of chest pain. Fortunately the workup in the ED has been unremarkable and at this time I am not concerned for ACS. The EKG shows sinus rhythm with sinus arrhythmia. The troponin is negative (and after almost a week of symptoms this should rule her out), and the  patient's HEART Score is 0 (zero). Given these findings, she is low risk for a major cardiac event at 6 weeks.   I considered other possible causes of chest pain including PE (negative D-dimer and LLE US), infection, pneumothorax, aortic dissection, and even more benign causes such as reflux and esophageal motility issues. The physician exam, laboratory, and radiological findings listed above make these conditions much less likely. At this time I believe the patient is stable for discharge. I have encouraged close Primary Care Physician follow up. Full anticipatory guidance given prior to discharge.    Diagnosis:    ICD-10-CM   1. Chest pain, unspecified type R07.9   2. Dyspnea, unspecified type R06.00       Disposition:  Discharged to home with plan as outlined.      I, Magnus Valenzuela, am serving as a scribe at 8:04 PM on 3/22/2018 to document services personally performed by Billy Durham DO based on my observations and the provider's statements to me.    3/22/2018   [facility] EMERGENCY DEPARTMENT     Billy Durham DO  03/22/18 5304

## 2018-03-23 NOTE — ED NOTES
ABC's intact.  Alert and oriented x4.    Pt states she had chest pain starting last Thursday, yesterday pain became much more acute.  Pain now associated with shortness of breath.

## 2018-03-23 NOTE — DISCHARGE INSTRUCTIONS
*CHEST PAIN, UNCERTAIN CAUSE    Based on your exam today, the exact cause of your chest pain is not certain. Your condition does not seem serious at this time, and your pain does not appear to be coming from your heart. However, sometimes the signs of a serious problem take more time to appear. Therefore, watch for the warning signs listed below.  HOME CARE:  1. Rest today and avoid strenuous activity.  2. Take any prescribed medicine as directed.  FOLLOW UP with your doctor in 1-3 days.   GET PROMPT MEDICAL ATTENTION if any of the following occur:    A change in the type of pain: if it feels different, becomes more severe, lasts longer, or begins to spread into your shoulder, arm, neck, jaw or back    Shortness of breath or increased pain with breathing    Weakness, dizziness, or fainting    Cough with blood or dark colored sputum (phlegm)    Fever over 101  F (38.3  C)    Swelling, pain or redness in one leg    9571-4748 [employer]. [address] [place] [address]. All rights reserved. This information is not intended as a substitute for professional medical care. Always follow your healthcare professional's instructions.  This information has been modified by your health care provider with permission from the publisher.      Shortness of Breath (Dyspnea)  Shortness of breath is the feeling that you can't catch your breath or get enough air. It is also known as dyspnea.  Dyspnea can be caused by many different conditions. They include:    Acute asthma attack.    Worsening of chronic lung diseases such as chronic bronchitis and emphysema.    Heart failure. This is when weak heart muscle allows extra fluid to collect in the lungs.    Panic attacks or anxiety. Fear can cause rapid breathing (hyperventilation).    Pneumonia, or an infection in the lung tissue.    Exposure to toxic substances, fumes, smoke, or certain medicines.    Blood clot in the lung (pulmonary embolism). This is often  from a piece of blood clot in a deep vein of the leg (deep vein thrombosis) that breaks off and travels to the lungs.    Heart attack or heart-related chest pain (angina).    Anemia.    Collapsed lung (pneumothorax).    Dehydration.    Pregnancy.  Based on your visit today, the exact cause of your shortness of breath is not certain. Your tests don t show any of the serious causes of dyspnea. You may need other tests to find out if you have a serious problem. It s important to watch for any new symptoms or symptoms that get worse. Follow up with your healthcare provider as directed.  Home care  Follow these tips to take care of yourself at home:    When your symptoms are better, go back to your usual activities.    If you smoke, you should stop. Join a quit-smoking program or ask your healthcare provider for help.    Eat a healthy diet and get plenty of sleep.    Get regular exercise. Talk with your healthcare provider before starting to exercise, especially if you have other medical problems.    Cut down on the amount of caffeine and stimulants you consume.  Follow-up care  Follow up with your healthcare provider, or as advised.  If tests were done, you will be told if your treatment needs to be changed. You can call as directed for the results.  (Note: If an X-ray was taken, a specialist will review it. You will be notified of any new findings that may affect your care.)  Call 911 or get immediate medical care  Shortness of breath may be a sign of a serious medical problem. For example, it may be a problem with your heart or lungs. Call 911 if you have worsening shortness of breath or trouble breathing, especially with any of the symptoms below:    You are confused or it s difficult to wake you.    You faint or lose consciousness.    You have a fast heartbeat, or your heartbeat is irregular.    You are coughing up blood.    You have pain in your chest, arm, shoulder, neck, or upper back.    You break out in a  sweat.  When to seek medical advice  Call your healthcare provider right away if any of these occur:    Slight shortness of breath or wheezing    Redness, pain or swelling in your leg, arm, or other body area    Swelling in both legs or ankles    Fast weight gain    Dizziness or weakness    Fever of 100.4 F (38 C) or higher, or as directed by your healthcare provider  Date Last Reviewed: 9/13/2015 2000-2017 [employer]. [address] [place] [address], [place] [address]. All rights reserved. This information is not intended as a substitute for professional medical care. Always follow your healthcare professional's instructions.

## 2018-04-10 ENCOUNTER — OFFICE VISIT (OUTPATIENT)
Dept: FAMILY MEDICINE | Facility: CLINIC | Age: 43
End: 2018-04-10
Payer: COMMERCIAL

## 2018-04-10 VITALS
RESPIRATION RATE: 20 BRPM | DIASTOLIC BLOOD PRESSURE: 80 MMHG | TEMPERATURE: 98.2 F | HEIGHT: 65 IN | WEIGHT: 200.1 LBS | HEART RATE: 92 BPM | SYSTOLIC BLOOD PRESSURE: 114 MMHG | BODY MASS INDEX: 33.34 KG/M2

## 2018-04-10 DIAGNOSIS — R79.82 ELEVATED C-REACTIVE PROTEIN (CRP): ICD-10-CM

## 2018-04-10 DIAGNOSIS — R10.10 UPPER ABDOMINAL PAIN: ICD-10-CM

## 2018-04-10 DIAGNOSIS — M79.7 FIBROMYALGIA: ICD-10-CM

## 2018-04-10 DIAGNOSIS — K21.9 GASTROESOPHAGEAL REFLUX DISEASE WITHOUT ESOPHAGITIS: Primary | ICD-10-CM

## 2018-04-10 DIAGNOSIS — N93.9 VAGINAL BLEEDING: ICD-10-CM

## 2018-04-10 LAB
ALBUMIN SERPL-MCNC: 3.8 G/DL (ref 3.4–5)
ALP SERPL-CCNC: 90 U/L (ref 40–150)
ALT SERPL W P-5'-P-CCNC: 33 U/L (ref 0–50)
ANION GAP SERPL CALCULATED.3IONS-SCNC: 6 MMOL/L (ref 3–14)
AST SERPL W P-5'-P-CCNC: 21 U/L (ref 0–45)
BASOPHILS # BLD AUTO: 0 10E9/L (ref 0–0.2)
BASOPHILS NFR BLD AUTO: 0.2 %
BILIRUB SERPL-MCNC: 0.4 MG/DL (ref 0.2–1.3)
BUN SERPL-MCNC: 10 MG/DL (ref 7–30)
CALCIUM SERPL-MCNC: 9 MG/DL (ref 8.5–10.1)
CHLORIDE SERPL-SCNC: 107 MMOL/L (ref 94–109)
CO2 SERPL-SCNC: 25 MMOL/L (ref 20–32)
CREAT SERPL-MCNC: 0.77 MG/DL (ref 0.52–1.04)
CRP SERPL-MCNC: 5.2 MG/L (ref 0–8)
DIFFERENTIAL METHOD BLD: ABNORMAL
EOSINOPHIL # BLD AUTO: 0.2 10E9/L (ref 0–0.7)
EOSINOPHIL NFR BLD AUTO: 1.8 %
ERYTHROCYTE [DISTWIDTH] IN BLOOD BY AUTOMATED COUNT: 12.6 % (ref 10–15)
GFR SERPL CREATININE-BSD FRML MDRD: 82 ML/MIN/1.7M2
GLUCOSE SERPL-MCNC: 77 MG/DL (ref 70–99)
HCT VFR BLD AUTO: 42.1 % (ref 35–47)
HGB BLD-MCNC: 14 G/DL (ref 11.7–15.7)
LYMPHOCYTES # BLD AUTO: 2.5 10E9/L (ref 0.8–5.3)
LYMPHOCYTES NFR BLD AUTO: 22.3 %
MCH RBC QN AUTO: 30.4 PG (ref 26.5–33)
MCHC RBC AUTO-ENTMCNC: 33.3 G/DL (ref 31.5–36.5)
MCV RBC AUTO: 92 FL (ref 78–100)
MONOCYTES # BLD AUTO: 0.8 10E9/L (ref 0–1.3)
MONOCYTES NFR BLD AUTO: 7.3 %
NEUTROPHILS # BLD AUTO: 7.6 10E9/L (ref 1.6–8.3)
NEUTROPHILS NFR BLD AUTO: 68.4 %
PLATELET # BLD AUTO: 364 10E9/L (ref 150–450)
POTASSIUM SERPL-SCNC: 3.6 MMOL/L (ref 3.4–5.3)
PROT SERPL-MCNC: 7.9 G/DL (ref 6.8–8.8)
RBC # BLD AUTO: 4.6 10E12/L (ref 3.8–5.2)
SODIUM SERPL-SCNC: 138 MMOL/L (ref 133–144)
WBC # BLD AUTO: 11.1 10E9/L (ref 4–11)

## 2018-04-10 PROCEDURE — 36415 COLL VENOUS BLD VENIPUNCTURE: CPT | Performed by: FAMILY MEDICINE

## 2018-04-10 PROCEDURE — 87338 HPYLORI STOOL AG IA: CPT | Performed by: FAMILY MEDICINE

## 2018-04-10 PROCEDURE — 86140 C-REACTIVE PROTEIN: CPT | Performed by: FAMILY MEDICINE

## 2018-04-10 PROCEDURE — 80053 COMPREHEN METABOLIC PANEL: CPT | Performed by: FAMILY MEDICINE

## 2018-04-10 PROCEDURE — 86431 RHEUMATOID FACTOR QUANT: CPT | Performed by: FAMILY MEDICINE

## 2018-04-10 PROCEDURE — 85025 COMPLETE CBC W/AUTO DIFF WBC: CPT | Performed by: FAMILY MEDICINE

## 2018-04-10 PROCEDURE — 99214 OFFICE O/P EST MOD 30 MIN: CPT | Performed by: FAMILY MEDICINE

## 2018-04-10 ASSESSMENT — PAIN SCALES - GENERAL: PAINLEVEL: MILD PAIN (2)

## 2018-04-10 NOTE — MR AVS SNAPSHOT
After Visit Summary   4/10/2018    Oly Rush    MRN: [id number]           Patient Information     Date Of Birth          1975        Visit Information        Provider Department      4/10/2018 9:00 AM Carrie Prasad MD [facility]        Today's Diagnoses     Gastroesophageal reflux disease without esophagitis    -  1    Upper abdominal pain        Vaginal bleeding           Follow-ups after your visit        Follow-up notes from your care team     Return in about 4 weeks (around 5/8/2018).      Your next 10 appointments already scheduled     May 01, 2018  1:00 PM CDT   [facility] with Man[facility] Santos,    [facility]    [id number] [facility] [address] [phone]              Future tests that were ordered for you today     Open Future Orders        Priority Expected Expires Ordered    H Pylori antigen stool Routine  5/10/2018 4/10/2018    US Abdomen Complete Routine  4/10/2019 4/10/2018    US Pelvic Complete w Transvaginal Routine  4/10/2019 4/10/2018            Who to contact     If you have questions or need follow up information about today's clinic visit or your schedule please contact [facility] directly at [phone].  Normal or non-critical lab and imaging results will be communicated to you by MyChart, letter or phone within 4 business days after the clinic has received the results. If you do not hear from us within 7 days, please contact the clinic through MyChart or phone. If you have a critical or abnormal lab result, we will notify you by phone as soon as possible.  Submit refill requests through [employer] or call your pharmacy and they will forward the refill request to us. Please allow 3 business days for your refill to be completed.          Additional Information About Your Visit        Mobile [employer] Information     [employer] gives you secure access to your electronic health  "record. If you see a primary care provider, you can also send messages to your care team and make appointments. If you have questions, please call your primary care clinic.  If you do not have a primary care provider, please call [phone] and they will assist you.        Care EveryWhere ID     This is your Care EveryWhere ID. This could be used by other organizations to access your [facility] medical records  [id number]        Your Vitals Were     Pulse Temperature Respirations Height Last Period BMI (Body Mass Index)    92 98.2  F (36.8  C) (Oral) 20 5' 5\" (1.651 m) 09/26/2012 33.3 kg/m2       Blood Pressure from Last 3 Encounters:   04/10/18 114/80   03/22/18 116/67   01/08/18 118/78    Weight from Last 3 Encounters:   04/10/18 200 lb 1.6 oz (90.8 kg)   01/08/18 195 lb (88.5 kg)   08/21/17 190 lb 12.8 oz (86.5 kg)              We Performed the Following     Comprehensive metabolic panel          Today's Medication Changes          These changes are accurate as of 4/10/18  9:40 AM.  If you have any questions, ask your nurse or doctor.               Start taking these medicines.        Dose/Directions    omeprazole 20 MG CR capsule   Commonly known as:  priLOSEC   Used for:  Gastroesophageal reflux disease without esophagitis   Started by:  Carrie Prasad MD        Dose:  20 mg   Take 1 capsule (20 mg) by mouth daily   Quantity:  30 capsule   Refills:  1            Where to get your medicines      Some of these will need a paper prescription and others can be bought over the counter.  Ask your nurse if you have questions.     You don't need a prescription for these medications     omeprazole 20 MG CR capsule                Primary Care Provider Office Phone # Fax #    Carrie Prasad [phone]       [id number]  [address]   [facility] [address]        Equal Access to Services     JODI MORA AH: Hadii katty Pryor, waarleneda luqadaha, qaybta kaalluz marina lees " chicho stoneakbar carnes'aan ah. So [facility] [phone].    ATENCIÓN: Si lisala odalis, tiene a zamarripa disposición servicios gratuitos de asistencia lingüística. Juan al [phone].    We comply with applicable federal civil rights laws and [place] laws. We do not discriminate on the basis of race, color, national origin, age, disability, sex, sexual orientation, or gender identity.            Thank you!     Thank you for choosing [facility]  for your care. Our goal is always to provide you with excellent care. Hearing back from our patients is one way we can continue to improve our services. Please take a few minutes to complete the written survey that you may receive in the mail after your visit with us. Thank you!             Your Updated Medication List - Protect others around you: Learn how to safely use, store and throw away your medicines at www.disposemymeds.org.          This list is accurate as of 4/10/18  9:40 AM.  Always use your most recent med list.                   Brand Name Dispense Instructions for use Diagnosis    ACIDOPHILUS PROBIOTIC BLEND Caps           ascorbic acid 1000 MG Tabs    vitamin C    30 tablet    Take 1 tablet (1,000 mg) by mouth daily        azithromycin 250 MG tablet    ZITHROMAX    6 tablet    Two tablets first day, then one tablet daily for four days.    Acute bronchitis with symptoms > 10 days       cholecalciferol 5000 UNITS Tabs tablet    vitamin D3     Take by mouth daily        cyclobenzaprine 5 MG tablet    FLEXERIL    30 tablet    Take 1 tablet (5 mg) by mouth nightly as needed for muscle spasms    Myalgia and myositis       HM OMEGA-3-6-9 FATTY ACIDS PO           MAGNESIUM MALATE PO      Take 650 mg by mouth        methocarbamol 500 MG tablet    ROBAXIN    60 tablet    Take 1 tablet (500 mg) by mouth 4 times daily as needed for muscle spasms    Chest wall pain       omeprazole 20 MG CR capsule    priLOSEC    30 capsule    Take 1 capsule (20 mg) by  mouth daily    Gastroesophageal reflux disease without esophagitis       vitamin B complex with vitamin C Tabs tablet      Take 1 tablet by mouth daily

## 2018-04-10 NOTE — PROGRESS NOTES
HPI   SUBJECTIVE:   Oly Rush is a 42 year old female who presents to clinic today for the following health issues:    ED/UC Followup:    Facility:  [facility]   Date of visit: 03/22/18  Reason for visit: CHEST PAIN  Current Status: CHEST PAIN SLOWLY GETTING BETTER.        ABDOMINAL PAIN     Onset: a couple of weeks.     Description:   Character: Sharp, Fullness and Cramping  Location: epigastric region  Radiation: Back, Mid-Shoulder blade and Sternal area    Intensity: severe, 9/10    Progression of Symptoms:  improving and intermittent    Accompanying Signs & Symptoms:  Fever/Chills?: no, sweats   Gas/Bloating: YES  Nausea: YES  Heart Burn:  YES-in the last 2 weeks(at night when patient is laying down)    Vomitting: YES  Diarrhea?: YES  Constipation:no   Dysuria or Hematuria: no    History:   Trauma: no   Previous similar pain: YES- over the years    Previous tests done: none    Precipitating factors:   Does the pain change with:     Food: YES- has a poor appetite.  Eat small portions.  Has been eating a lot of cereal.  And drinking tea instead of coffee.       BM: YES- feels some relief     Urination: no     Alleviating factors:  Sleeping on a incline, drinking tea and eating a lot of cereal.      Therapies Tried and outcome: eating cereal, drinking tea and sleeping on a incline.     LMP:  not applicable       Patient was recently traveling a few weeks ago but symptoms did not occur until after she returned. Symptoms included hot flashes, facial flushing, SOB, nausea, and chest pains. ED EKG and lab work up was overall normal, except her WBC remains high. Patient reports that she believes her anxiety has been very high the past couple of months but notes that the recent symptoms were slightly different, hence her trip to the ED.     Chest pains improved Sunday but she continued to have pain under the breast bones and upper flanks. She went out to eat for dinner on Sunday and notes that she was just  "sitting there when all of a sudden she started experiencing severe abdominal pain. She comments, \"that it feels as if all the chest pain I was experiencing suddenly shifted downwards to my stomach\". She was having a bloody henry at [facility].  She went to the restroom because she thought she was going to have diarrhea but didn't. She then felt very ill-- she felt as if she was going to pass out. She went out to her car but had to return to the bathroom where she had an episode of diarrhea and vomited which were new symptoms for her.     Since then, She cut out coffee from her diet and has been eating soup/broths. She has eaten some cereal as well which has not been bothering her stomach as much. She did have a blood henry on Sunday with dinner but did not finish it. She has not had any more alcohol this week. She is not longer taking magnesium which she was taking for fibromyalgia.     She does not usually struggle with heartburn but notes that the past 2 weeks she has been experiencing horrible heartburn at night when she lays down. She has had to sleep propped up. She has never had an upper GI endoscopy performed before. She hardly drinks carbonated drinks. She used to take a lot of ibuprofen, almost daily, for fibromyalgia and headaches. She stopped around Fall 2017 when she started the medical marijuana. She now takes it a couple times a month. Notes that she has been belches more recently. She does feel a burning sensation. OTC Tums used to work but was no longer providing any relief. She then tried taking a supplement that she believes is mostly baking powder. Omeprazole seemed to help and she only took it for a couple of days. She has not had her gall bladder removed.     Fibromyalgia  Notes that the fibromyalgia has improved since starting medical marijuana. She uses it 2-3 times a week before bed. She did not like using the vaping form as it burned. She prefers using the spray under her tongue. Notes that the " oral suspension form (looks similar to cough syrup) upset her stomach. She has been using the spray form for a month or so now. Patient notes that the tincture is more THC than CBD. Patient relates that her last CRP was elevated.     Vaginal bleeding  She has an appointment 5/1/2018 with Dr. Santos because she has been spotting. Notes that she was diagnosed with cervical cancer 1998 or 1999. She also had a miscarriage when she was 25 years old. She had a hysterectomy due to the cervical cancer and an ovarian cyst, but notes that her cervix was not removed during the hysterectomy(on review of chart, I do not see cervical cancer, but dysplasia)    She has been having spotting since the hysterectomy but this has been changing the past 6-8 months. Sometimes she gets the spotting, sometimes she does not. The spotting may now sometimes be bright red.     Problem list and histories reviewed & adjusted, as indicated.  Additional history: as documented    BP Readings from Last 3 Encounters:   04/10/18 114/80   03/22/18 116/67   01/08/18 118/78    Wt Readings from Last 3 Encounters:   04/10/18 90.8 kg (200 lb 1.6 oz)   01/08/18 88.5 kg (195 lb)   08/21/17 86.5 kg (190 lb 12.8 oz)          Labs reviewed in EPIC    Reviewed and updated as needed this visit by clinical staff  Tobacco  Allergies  Meds  Problems       Reviewed and updated as needed this visit by Provider       ROS:  Constitutional, HEENT, cardiovascular, pulmonary, gi and gu systems are negative, except as otherwise noted.    GI: POSITIVE for diarrhea, abdominal pain, and heartburn  : POSITIVE for vaginal bleeding    This document serves as a record of the services and decisions personally performed and made by Carrie Prasad MD. It was created on her behalf by Misa Silva, a trained medical scribe. The creation of this document is based on the provider's statements to the medical scribe.  Misa Silva April 10, 2018 9:17 AM     OBJECTIVE:     BP  "114/80 (BP Location: Right arm, Patient Position: Chair, Cuff Size: Adult Regular)  Pulse 92  Temp 98.2  F (36.8  C) (Oral)  Resp 20  Ht 1.651 m (5' 5\")  Wt 90.8 kg (200 lb 1.6 oz)  LMP 09/26/2012  BMI 33.3 kg/m2  Body mass index is 33.3 kg/(m^2).     GENERAL: healthy, alert and no distress  EYES: Eyes grossly normal to inspection  RESP: lungs clear to auscultation - no rales, rhonchi or wheezes  CV: regular rate and rhythm, normal S1 S2, no S3 or S4, no murmur, click or rub, no peripheral edema and peripheral pulses strong  ABDOMEN: soft, tenderness in epigastric region, mid abdominal region, and right abdominal region , no hepatosplenomegaly, no masses and bowel sounds normal, no rebound or guarding  MS: no gross musculoskeletal defects noted, no edema  SKIN: no suspicious lesions or rashes  NEURO: Normal strength and tone, mentation intact and speech normal  PSYCH: mentation appears normal, affect normal/bright    Diagnostic Test Results:  No results found for this or any previous visit (from the past 24 hour(s)).    ASSESSMENT/PLAN:   (K21.9) Gastroesophageal reflux disease without esophagitis  (primary encounter diagnosis)  Comment: Lab work up today-will check for H. Pylori. Take omeprazole for a month in the mornings on an empty stomach. Avoid coffee, alcohol, carbonated beverages, etc anything that will flare or worsen heartburn symptoms. If symptoms do not improve, recommend following up with gastroenterology   Plan: H Pylori antigen stool, Comprehensive metabolic        panel, US Abdomen Complete, omeprazole         (PRILOSEC) 20 MG CR capsule          (R10.10) Upper abdominal pain  Comment: US ordered. Lab work up today  Plan: US Abdomen Complete, CBC with platelets         differential          (N93.9) Vaginal bleeding  Comment: Patient reports that she was told she has cervical cancer but previous medical records only noted severe dysplasia. Unsure if some medical records are missing-- recommend " clearing this up with Dr. Santos at appointment on 5/1/2018. Recommend a pelvic US  Plan: US Pelvic Complete w Transvaginal          (M79.7) Fibromyalgia  Comment: Overall stable. Recently started medical marijuana spray under the tongue which has been working well for patient. Continue current measures.     (R79.82) Elevated C-reactive protein (CRP)  Comment: Last CRP elevated. Rechecking today    Follow up in 1 month or sooner if symptoms worsen or do not improve    The information in this document, created by the medical scribe for me, accurately reflects the services I personally performed and the decisions made by me. I have reviewed and approved this document for accuracy prior to leaving the patient care area.  April 10, 2018 9:18 AM    Carrie Prasad MD  [facility]      Physical Exam

## 2018-04-11 LAB — RHEUMATOID FACT SER NEPH-ACNC: 28 IU/ML (ref 0–20)

## 2018-04-12 LAB
H PYLORI AG STL QL IA: NORMAL
SPECIMEN SOURCE: NORMAL

## 2018-04-13 ENCOUNTER — HOSPITAL ENCOUNTER (OUTPATIENT)
Dept: ULTRASOUND IMAGING | Facility: CLINIC | Age: 43
End: 2018-04-13
Attending: FAMILY MEDICINE
Payer: COMMERCIAL

## 2018-04-13 ENCOUNTER — HOSPITAL ENCOUNTER (OUTPATIENT)
Dept: ULTRASOUND IMAGING | Facility: CLINIC | Age: 43
Discharge: HOME | End: 2018-04-13
Attending: FAMILY MEDICINE | Admitting: FAMILY MEDICINE
Payer: COMMERCIAL

## 2018-04-13 DIAGNOSIS — N93.9 VAGINAL BLEEDING: ICD-10-CM

## 2018-04-13 DIAGNOSIS — K21.9 GASTROESOPHAGEAL REFLUX DISEASE WITHOUT ESOPHAGITIS: ICD-10-CM

## 2018-04-13 DIAGNOSIS — R10.10 UPPER ABDOMINAL PAIN: ICD-10-CM

## 2018-04-13 PROCEDURE — 76856 US EXAM PELVIC COMPLETE: CPT

## 2018-04-13 PROCEDURE — 76700 US EXAM ABDOM COMPLETE: CPT

## 2018-04-23 NOTE — PROGRESS NOTES
Results released to [facility]:  Dear Ms. Rush,  CRP has normalized. Good.     Sincerely    Atilio Wallace MD  [facility]

## 2018-05-01 ENCOUNTER — OFFICE VISIT (OUTPATIENT)
Dept: OBGYN | Facility: CLINIC | Age: 43
End: 2018-05-01
Payer: COMMERCIAL

## 2018-05-01 VITALS
WEIGHT: 200 LBS | SYSTOLIC BLOOD PRESSURE: 106 MMHG | DIASTOLIC BLOOD PRESSURE: 62 MMHG | HEIGHT: 65 IN | BODY MASS INDEX: 33.32 KG/M2

## 2018-05-01 DIAGNOSIS — Z00.00 ROUTINE GENERAL MEDICAL EXAMINATION AT A HEALTH CARE FACILITY: Primary | ICD-10-CM

## 2018-05-01 PROCEDURE — 99396 PREV VISIT EST AGE 40-64: CPT | Performed by: FAMILY MEDICINE

## 2018-05-01 PROCEDURE — 87624 HPV HI-RISK TYP POOLED RSLT: CPT | Performed by: FAMILY MEDICINE

## 2018-05-01 PROCEDURE — G0145 SCR C/V CYTO,THINLAYER,RESCR: HCPCS | Performed by: FAMILY MEDICINE

## 2018-05-01 NOTE — PATIENT INSTRUCTIONS
Return yearly     Call Lab [phone] [place] or [phone] [place] to schedule future labs. You may schedule   The labs at any [facility] facitily.  If you are getting cholesterol checked please fast 8 hours     Dr. Manasa Santos, DO    Obstetrics and Gynecology  [facility] [place] [facility] [place]

## 2018-05-01 NOTE — MR AVS SNAPSHOT
After Visit Summary   5/1/2018    Oly Rush    MRN: [id number]           Patient Information     Date Of Birth          1975        Visit Information        Provider Department      5/1/2018 1:00 PM Manasa Santos, DO [facility]        Today's Diagnoses     Routine general medical examination at a health care facility    -  1      Care Instructions    Return yearly     Call Lab [phone] [place] or [phone] [place] to schedule future labs. You may schedule   The labs at any [facility].  If you are getting cholesterol checked please fast 8 hours     Dr. Manasa Santos, DO    Obstetrics and Gynecology  [facility]                 Dr. Manasa Santos, DO    Obstetrics and Gynecology  [facility]                 Follow-ups after your visit        Future tests that were ordered for you today     Open Future Orders        Priority Expected Expires Ordered    Lipid panel reflex to direct LDL Fasting Routine  5/1/2019 5/1/2018    TSH with free T4 reflex Routine  5/1/2019 5/1/2018            Who to contact     If you have questions or need follow up information about today's clinic visit or your schedule please contact [facility] directly at [phone].  Normal or non-critical lab and imaging results will be communicated to you by MyChart, letter or phone within 4 business days after the clinic has received the results. If you do not hear from us within 7 days, please contact the clinic through MyChart or phone. If you have a critical or abnormal lab result, we will notify you by phone as soon as possible.  Submit refill requests through [employer] or call your pharmacy and they will forward the refill request to us. Please allow 3 business days for your refill to be completed.          Additional Information About Your Visit        [employer] Information     [employer] gives you secure  "access to your electronic health record. If you see a primary care provider, you can also send messages to your care team and make appointments. If you have questions, please call your primary care clinic.  If you do not have a primary care provider, please call [phone] and they will assist you.        Care EveryWhere ID     This is your Care EveryWhere ID. This could be used by other organizations to access your [facility] medical records  [id number]        Your Vitals Were     Height Last Period BMI (Body Mass Index)             5' 5\" (1.651 m) 09/26/2012 33.28 kg/m2          Blood Pressure from Last 3 Encounters:   05/01/18 106/62   04/10/18 114/80   03/22/18 116/67    Weight from Last 3 Encounters:   05/01/18 200 lb (90.7 kg)   04/10/18 200 lb 1.6 oz (90.8 kg)   01/08/18 195 lb (88.5 kg)               Primary Care Provider Office Phone # Fax #    Carrie Peg Prasad [phone]       [id number]  [address]   [facility] [address]        Equal Access to Services     [facility] AH: Hadii aad ku hadasho Soomaali, waaxda luqadaha, qaybta kaalmada adeegyada, waxay vetoin hayaan adeeg nay lanelson . So [facility] [phone].    ATENCIÓN: Si habla español, tiene a zamarripa disposición servicios gratuitos de asistencia lingüística. Llame al [phone].    We comply with applicable federal civil rights laws and [place] laws. We do not discriminate on the basis of race, color, national origin, age, disability, sex, sexual orientation, or gender identity.            Thank you!     Thank you for choosing [facility]  for your care. Our goal is always to provide you with excellent care. Hearing back from our patients is one way we can continue to improve our services. Please take a few minutes to complete the written survey that you may receive in the mail after your visit with us. Thank you!             Your Updated Medication List - Protect others around you: Learn how to safely use, store " and throw away your medicines at www.disposemymeds.org.          This list is accurate as of 5/1/18  1:19 PM.  Always use your most recent med list.                   Brand Name Dispense Instructions for use Diagnosis    ACIDOPHILUS PROBIOTIC BLEND Caps           ascorbic acid 1000 MG Tabs    vitamin C    30 tablet    Take 1 tablet (1,000 mg) by mouth daily        cholecalciferol 5000 units Tabs tablet    vitamin D3     Take by mouth daily        cyclobenzaprine 5 MG tablet    FLEXERIL    30 tablet    Take 1 tablet (5 mg) by mouth nightly as needed for muscle spasms    Myalgia and myositis       HM OMEGA-3-6-9 FATTY ACIDS PO           MAGNESIUM MALATE PO      Take 650 mg by mouth        methocarbamol 500 MG tablet    ROBAXIN    60 tablet    Take 1 tablet (500 mg) by mouth 4 times daily as needed for muscle spasms    Chest wall pain       omeprazole 20 MG CR capsule    priLOSEC    30 capsule    Take 1 capsule (20 mg) by mouth daily    Gastroesophageal reflux disease without esophagitis       vitamin B complex with vitamin C Tabs tablet      Take 1 tablet by mouth daily

## 2018-05-01 NOTE — LETTER
May 9, 2018    Oly Rush  [address] [facility] [facility] [address]    Dear Oly,  We are happy to inform you that your PAP smear result from 05/01/18 is normal.  We are now able to do a follow up test on PAP smears. The DNA test is for HPV (Human Papilloma Virus). Cervical cancer is closely linked with certain types of HPV. Your results showed no evidence of high risk HPV.  Therefore we recommend you return in 1 year for your next pap smear.  You will also need to return to the clinic every year for an annual exam and other preventive tests.  Please contact the clinic at [phone] with any questions.  Sincerely,    Manasa Santos DO/ghulam

## 2018-05-01 NOTE — NURSING NOTE
"Chief Complaint   Patient presents with     Gyn Exam     had partial hysterectomy 10/2012--has had spotting cyclical since then--spotting has changed       Initial /62  Ht 5' 5\" (1.651 m)  Wt 200 lb (90.7 kg)  LMP 09/26/2012  BMI 33.28 kg/m2 Estimated body mass index is 33.28 kg/(m^2) as calculated from the following:    Height as of this encounter: 5' 5\" (1.651 m).    Weight as of this encounter: 200 lb (90.7 kg).  Medication Reconciliation: complete   Joana Vang CMA      "

## 2018-05-01 NOTE — PROGRESS NOTES
SUBJECTIVE:  Oly Rush is an 42 year old  woman who presents for   annual gyn exam. Patient's last menstrual period was 2012. Periods are light, occurring regular q 28-30 days, lasting 5 days. Dysmenorrhea: mild to moderate, depending on cycle. Cyclic symptoms   include pelvic pain/cramping, back pain. No intermenstrual bleeding,   spotting, or discharge.  Menarche age teen.  STD hx: none.    Current contraception: hysterectomy  MAGY exposure: no  History of abnormal Pap smear: Yes: Cone bx and leep, age 24yo, normal paps since then  Family history of uterine or ovarian cancer: Yes: Aunt - Cervical and Ovarian; will plan for yearly paps due to this FMHx  Regular self breast exam: Yes  History of abnormal mammogram: No  Family history of breast cancer: No  History of abnormal lipids: No    Hx of supracervical hysterectomy without removal of right ovary on 10/2/12. US on 18 was wnl.   Patient notes she has had cyclical bleeding since her hysterectomy; adds some periods are very light and some are heavy with moderate dysmenorrhea including pelvic cramping and back pain.     Oly also notes she was recently diagnosed with fibromyalgia.     Past Medical History:   Diagnosis Date     Abnormal Pap smear     cone bx     Anxiety         Family History   Problem Relation Age of Onset     Ovarian Cancer Maternal Aunt      Other Cancer Maternal Aunt      larynx cancer     C.A.D. No family hx of      DIABETES No family hx of      Hypertension No family hx of      Breast Cancer No family hx of      Cancer - colorectal No family hx of        Past Surgical History:   Procedure Laterality Date     C NONSPECIFIC PROCEDURE      Cone Biopsy of cervix     C/SECTION, LOW TRANSVERSE      , Low Transverse     C/SECTION, LOW TRANSVERSE  2004    , Low Transverse     DAVINCI HYSTERECTOMY TOTAL, BILATERAL SALPINGO-OOPHORECTOMY, COMBINED  10/2/2012    Procedure: COMBINED DAVINCI HYSTERECTOMY  "TOTAL, SALPINGO-OOPHORECTOMY;   DAVINCI  Supra cervical HYSTERECTOMY TOTAL, Bilateral Salpingectomies with Left Oophorectomy and Cystoscopy;  Surgeon: Manasa Santos DO;  Location: [facility] OR       Current Outpatient Prescriptions   Medication     ascorbic acid (VITAMIN C) 1000 MG TABS     cholecalciferol (VITAMIN D3) 5000 UNITS TABS tablet     cyclobenzaprine (FLEXERIL) 5 MG tablet     HM OMEGA-3-6-9 FATTY ACIDS PO     MAGNESIUM MALATE PO     methocarbamol (ROBAXIN) 500 MG tablet     omeprazole (PRILOSEC) 20 MG CR capsule     Probiotic Product (ACIDOPHILUS PROBIOTIC BLEND) CAPS     vitamin B complex with vitamin C (VITAMIN  B COMPLEX) TABS tablet     No current facility-administered medications for this visit.      Allergies   Allergen Reactions     Flagyl [Metronidazole] Other (See Comments)     paresthesias       Social History   Substance Use Topics     Smoking status: Never Smoker     Smokeless tobacco: Never Used     Alcohol use 0.0 oz/week     0 Standard drinks or equivalent per week      Comment: socially      Review Of Systems  Ears/Nose/Throat: negative  Respiratory: No shortness of breath, dyspnea on exertion, cough, or hemoptysis  Cardiovascular: negative  Gastrointestinal: negative  Genitourinary: see HPI  Constitutional, HEENT, cardiovascular, pulmonary, GI, , musculoskeletal, neuro, skin, endocrine and psych systems are negative, except as otherwise noted.    This document serves as a record of the services and decisions personally performed and made by Manasa Santos DO. It was created on his behalf by Nora Sharpe, a trained medical scribe. The creation of this document is based on the provider's statements to the medical scribe.  Nora Sharpe 1:12 PM May 1, 2018    OBJECTIVE:  /62  Ht 1.651 m (5' 5\")  Wt 90.7 kg (200 lb)  LMP 09/26/2012  BMI 33.28 kg/m2    General appearance: healthy, alert and no distress  Skin: Skin color, texture, turgor normal. No rashes or lesions.  Ears: " negative  Nose/Sinuses: Nares normal. Septum midline. Mucosa normal. No drainage or sinus tenderness.  Oropharynx: Lips, mucosa, and tongue normal. Teeth and gums normal.  Neck: Neck supple. No adenopathy. Thyroid symmetric, normal size,, Carotids without bruits.  Lungs: negative, Percussion normal. Good diaphragmatic excursion. Lungs clear  Heart: negative, PMI normal. No lifts, heaves, or thrills. RRR. No murmurs, clicks gallops or rub  Breasts: Inspection negative. No nipple discharge or bleeding. No masses.  Abdomen: Abdomen soft, non-tender. BS normal. No masses, organomegaly  Pelvic: Pelvic examination with pap/without Gonorrhea and Chlamydia   including  External genitalia normal   and vagina normal rugatted not atrophic  Examination of urethra  normal no masses, tenderness, scarring  bladder, no masses or tenderness  Cervix no lesions or discharge   Bimanual Exam:   Adnexa/Parametria: Normal no masses  Uterus surgically absent         ASSESSMENT:  Oly Rush is an 42 year old  woman who presents for   annual gyn exam. Concerns:     PLAN:  Dx:  1)  Pap smear yearly due to FMHx of cervical cancer  2)  Mammography, lipids at appropriate intervals  3)   FMHx Cervical cancer with hx of supracervical hysterectomy: Discussed trachelectomy- Patient undecided, she has normal paps in the recent history.  Will consider.  She  Has every 1-2 spotting due to supracervical hysterectomy.  She used to spot monthly, but now it is spacing out which could be a sign that it will stop and I can find no risks with the spotting spacing out or stopping.  Reassurance given to patient.     4)  Return to clinic yearly or prn    PE:  Reviewed health maintenance including diet, regular exercise   and periodic exams.    The information in this document, created by the medical scribe for me, accurately reflects the services I personally performed and the decisions made by me. I have reviewed and approved this document for  accuracy prior to leaving the patient care area.    Manasa Santos DO  May 1, 2018 1:26 PM  Obstetrics and Gynecology  [facility]

## 2018-05-04 LAB
COPATH REPORT: NORMAL
PAP: NORMAL

## 2018-05-08 LAB
FINAL DIAGNOSIS: NORMAL
HPV HR 12 DNA CVX QL NAA+PROBE: NEGATIVE
HPV16 DNA SPEC QL NAA+PROBE: NEGATIVE
HPV18 DNA SPEC QL NAA+PROBE: NEGATIVE
SPECIMEN DESCRIPTION: NORMAL
SPECIMEN SOURCE CVX/VAG CYTO: NORMAL

## 2018-11-12 ENCOUNTER — MYC MEDICAL ADVICE (OUTPATIENT)
Dept: FAMILY MEDICINE | Facility: CLINIC | Age: 43
End: 2018-11-12

## 2018-11-12 DIAGNOSIS — Z11.4 SCREENING FOR HUMAN IMMUNODEFICIENCY VIRUS: Primary | ICD-10-CM

## 2018-11-12 DIAGNOSIS — Z11.4 SCREENING FOR HUMAN IMMUNODEFICIENCY VIRUS: ICD-10-CM

## 2018-11-12 PROCEDURE — 87389 HIV-1 AG W/HIV-1&-2 AB AG IA: CPT | Performed by: FAMILY MEDICINE

## 2018-11-12 PROCEDURE — 36415 COLL VENOUS BLD VENIPUNCTURE: CPT | Performed by: FAMILY MEDICINE

## 2018-11-14 LAB — HIV 1+2 AB+HIV1 P24 AG SERPL QL IA: NONREACTIVE

## 2018-12-13 ENCOUNTER — OFFICE VISIT (OUTPATIENT)
Dept: FAMILY MEDICINE | Facility: CLINIC | Age: 43
End: 2018-12-13

## 2018-12-13 VITALS
WEIGHT: 201.3 LBS | DIASTOLIC BLOOD PRESSURE: 80 MMHG | TEMPERATURE: 98.3 F | HEART RATE: 82 BPM | BODY MASS INDEX: 33.5 KG/M2 | OXYGEN SATURATION: 98 % | SYSTOLIC BLOOD PRESSURE: 120 MMHG | RESPIRATION RATE: 20 BRPM

## 2018-12-13 DIAGNOSIS — M54.50 CHRONIC BILATERAL LOW BACK PAIN WITHOUT SCIATICA: ICD-10-CM

## 2018-12-13 DIAGNOSIS — G89.29 CHRONIC BILATERAL LOW BACK PAIN WITHOUT SCIATICA: ICD-10-CM

## 2018-12-13 DIAGNOSIS — M79.7 FIBROMYALGIA: Primary | ICD-10-CM

## 2018-12-13 PROCEDURE — 99214 OFFICE O/P EST MOD 30 MIN: CPT | Performed by: FAMILY MEDICINE

## 2018-12-13 RX ORDER — CYCLOBENZAPRINE HCL 5 MG
5 TABLET ORAL AT BEDTIME
Qty: 30 TABLET | Refills: 1 | Status: SHIPPED | OUTPATIENT
Start: 2018-12-13 | End: 2018-12-23

## 2018-12-13 NOTE — NURSING NOTE
"Chief Complaint   Patient presents with     Fibromyalgia     medication check     Initial /80 (BP Location: Right arm, Patient Position: Sitting, Cuff Size: Adult Large)   Pulse 82   Temp 98.3  F (36.8  C) (Oral)   Resp 20   Wt 91.3 kg (201 lb 4.8 oz)   LMP 09/26/2012   SpO2 98%   BMI 33.50 kg/m   Estimated body mass index is 33.5 kg/m  as calculated from the following:    Height as of 5/1/18: 1.651 m (5' 5\").    Weight as of this encounter: 91.3 kg (201 lb 4.8 oz).  BP completed using cuff size large right arm    Lisa Magill, CMA    "

## 2018-12-13 NOTE — PROGRESS NOTES
SUBJECTIVE:   Oly Rush is a 43 year old female who presents to clinic today for the following health issues:    Joint Pain    Onset: ongoing     Description:   Location: left shoulder, right shoulder, left elbow, right elbow, left knee, right knee, left hip and right hip  Character: Dull ache    Intensity: severe    Progression of Symptoms: better-less flare up's     Accompanying Signs & Symptoms:  Other symptoms: fatigue     History:   Previous similar pain: YES      Precipitating factors:   Trauma or overuse: YES- overuse and cold     Alleviating factors:  Improved by: heat    Therapies Tried and outcome: heat seems to help.     Patient reports that she is doing okay. It is better now that she has admitted to herself and is aware of her limitations. This past summer she modified her activities such as mowing the yard in 2 days instead of one which helps. She continues to have a lot of joint pains. She has been doing a lot of stretches which helps. She is taking flexeril 5 mg nightly which has really helped with her pain and has improved her sleep. She rarely uses robaxin. She used to take ibuprofen daily, now takes it about 2x a month. Notes that weather changes/cold weather is a trigger for her. Hot bathes and hot showers help with this. The only time she ends up throwing out her back is when she develops a cough.     Elevated rheumatoid factor  She has been evaluated by rheumatology who ruled out RA. They recommended continued monitoring. Rheumatoid factor on 4/10/2018 was elevated, 28. She was taking tumeric but stopped doing so because she read that it was hard on the kidneys.     GERD  No longer experiencing heartburn with omeprazole 20 mg daily.     Left arm pain  Patient shares that ever since she had the allergic reaction to flagyl, the whole left side of her body has not been the same. She will experience a weird pain when she sleeps on her left arm. She notes a pain under her left arm and the  "pain will radiate under her left breast. \"It feels like a ligament\". She does not wear under wire bras. She uses natural deodorant and states that sometimes she will develop a rash from irritated hair follicles.     Patient relates that she feels as if the muscles on her left forearm buldges at times and is overall bigger compared to her right forearm. 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 She has not tried a massage but states that she has done acupuncture in the past which helped her muscle pains. She uses a medical rub that contains CBD oil which helps alleviate pain.     Other problems to add on...  -She is not taking vitamin D supplements.     Problem list and histories reviewed & adjusted, as indicated.  Additional history: as documented    Recent Labs   Lab Test 04/10/18  0941 03/22/18  1951 07/31/17  0814 04/27/17  1604  01/27/16  1526  04/14/14  0938  05/25/12  0910   LDL  --   --  127*  --   --   --   --  139*  --  138*   HDL  --   --  60  --   --   --   --  48*  --  51   TRIG  --   --  113  --   --   --   --  68  --  124   ALT 33  --  29 41   < >  --    < >  --    < > 23   CR 0.77 0.73 0.76 0.72   < >  --    < >  --    < > 0.80   GFRESTIMATED 82 87 83 88   < >  --    < >  --    < > 81   GFRESTBLACK >90 >90 >90   GFR Calc   >90   GFR Calc     < >  --    < >  --    < > >90   POTASSIUM 3.6 3.5 3.8 3.4   < >  --    < >  --    < > 4.2   TSH  --   --  1.54  --   --  1.16  --  0.89  --  1.44    < > = values in this interval not displayed.      BP Readings from Last 3 Encounters:   12/13/18 120/80   05/01/18 106/62   04/10/18 114/80    Wt Readings from Last 3 Encounters:   12/13/18 91.3 kg (201 lb 4.8 oz)   05/01/18 90.7 kg (200 lb)   04/10/18 90.8 kg (200 lb 1.6 oz)          Labs reviewed in EPIC    Reviewed and updated as needed this visit by clinical staff  Tobacco  Allergies  Meds  Problems  Med Hx  Surg Hx  Fam Hx  Soc Hx        Reviewed and updated as needed this " visit by Provider         ROS:  Constitutional, HEENT, cardiovascular, pulmonary, gi and gu systems are negative, except as otherwise noted.    This document serves as a record of the services and decisions personally performed and made by Carrie Prasad MD. It was created on her behalf by Misa Silva, a trained medical scribe. The creation of this document is based on the provider's statements to the medical scribe.  Misa Silva December 13, 2018 2:27 PM     OBJECTIVE:     /80 (BP Location: Right arm, Patient Position: Sitting, Cuff Size: Adult Large)   Pulse 82   Temp 98.3  F (36.8  C) (Oral)   Resp 20   Wt 91.3 kg (201 lb 4.8 oz)   LMP 09/26/2012   SpO2 98%   BMI 33.50 kg/m    Body mass index is 33.5 kg/m .     GENERAL: healthy, alert and no distress  BREAST: normal without masses, tenderness or nipple discharge and no palpable axillary masses or adenopathy. Tight muscle under left arm noted, no lump  MS: no gross musculoskeletal defects noted, no edema. Positive for fibromyalgia trigger points   SKIN: no suspicious lesions or rashes  NEURO: Normal strength and tone, mentation intact and speech normal  PSYCH: mentation appears normal, affect normal/bright    Diagnostic Test Results:  none     ASSESSMENT/PLAN:   (M79.7) Fibromyalgia  (primary encounter diagnosis)  Comment: Stable. Patient is aware of what her triggers are and modifies activities accordingly. Taking medical marijuana nightly.     (M54.5,  G89.29) Chronic bilateral low back pain without sciatica  Comment: Stable. Refilled flexeril.   Plan: cyclobenzaprine (FLEXERIL) 5 MG tablet          Left arm pain: Breast exam was normal today. Recommended a massage as muscles seemed very tight. She is using topical cannabis oil as well    Recommended resuming vitamin D supplements      FUTURE APPOINTMENTS:       - Follow-up visit in 6 months    The information in this document, created by the medical scribe for me, accurately reflects the  services I personally performed and the decisions made by me. I have reviewed and approved this document for accuracy prior to leaving the patient care area.  December 13, 2018 2:51 PM    Carrie Prasad MD  [facility]

## 2019-01-24 ENCOUNTER — OFFICE VISIT (OUTPATIENT)
Dept: FAMILY MEDICINE | Facility: CLINIC | Age: 44
End: 2019-01-24
Payer: COMMERCIAL

## 2019-01-24 VITALS
WEIGHT: 201 LBS | OXYGEN SATURATION: 96 % | HEART RATE: 76 BPM | HEIGHT: 65 IN | RESPIRATION RATE: 18 BRPM | BODY MASS INDEX: 33.49 KG/M2 | SYSTOLIC BLOOD PRESSURE: 108 MMHG | DIASTOLIC BLOOD PRESSURE: 78 MMHG | TEMPERATURE: 98 F

## 2019-01-24 DIAGNOSIS — C53.9 MALIGNANT NEOPLASM OF CERVIX, UNSPECIFIED SITE (H): ICD-10-CM

## 2019-01-24 DIAGNOSIS — R10.11 RUQ ABDOMINAL PAIN: Primary | ICD-10-CM

## 2019-01-24 LAB
BASOPHILS # BLD AUTO: 0 10E9/L (ref 0–0.2)
BASOPHILS NFR BLD AUTO: 0.3 %
DIFFERENTIAL METHOD BLD: NORMAL
EOSINOPHIL # BLD AUTO: 0.2 10E9/L (ref 0–0.7)
EOSINOPHIL NFR BLD AUTO: 1.7 %
ERYTHROCYTE [DISTWIDTH] IN BLOOD BY AUTOMATED COUNT: 12.8 % (ref 10–15)
HCT VFR BLD AUTO: 40.7 % (ref 35–47)
HGB BLD-MCNC: 13.3 G/DL (ref 11.7–15.7)
LIPASE SERPL-CCNC: 110 U/L (ref 73–393)
LYMPHOCYTES # BLD AUTO: 2.4 10E9/L (ref 0.8–5.3)
LYMPHOCYTES NFR BLD AUTO: 25 %
MCH RBC QN AUTO: 30 PG (ref 26.5–33)
MCHC RBC AUTO-ENTMCNC: 32.7 G/DL (ref 31.5–36.5)
MCV RBC AUTO: 92 FL (ref 78–100)
MONOCYTES # BLD AUTO: 0.7 10E9/L (ref 0–1.3)
MONOCYTES NFR BLD AUTO: 7 %
NEUTROPHILS # BLD AUTO: 6.3 10E9/L (ref 1.6–8.3)
NEUTROPHILS NFR BLD AUTO: 66 %
PLATELET # BLD AUTO: 354 10E9/L (ref 150–450)
RBC # BLD AUTO: 4.43 10E12/L (ref 3.8–5.2)
WBC # BLD AUTO: 9.5 10E9/L (ref 4–11)

## 2019-01-24 PROCEDURE — 85025 COMPLETE CBC W/AUTO DIFF WBC: CPT | Performed by: NURSE PRACTITIONER

## 2019-01-24 PROCEDURE — 83690 ASSAY OF LIPASE: CPT | Performed by: NURSE PRACTITIONER

## 2019-01-24 PROCEDURE — 99213 OFFICE O/P EST LOW 20 MIN: CPT | Performed by: NURSE PRACTITIONER

## 2019-01-24 PROCEDURE — 80053 COMPREHEN METABOLIC PANEL: CPT | Performed by: NURSE PRACTITIONER

## 2019-01-24 PROCEDURE — 36415 COLL VENOUS BLD VENIPUNCTURE: CPT | Performed by: NURSE PRACTITIONER

## 2019-01-24 ASSESSMENT — MIFFLIN-ST. JEOR: SCORE: 1567.61

## 2019-01-24 NOTE — PROGRESS NOTES
"  SUBJECTIVE:   Oly Rush is a 43 year old female who presents to clinic today for the following health issues:      ABDOMINAL   PAIN     Onset: x 2 weeks     Description:   Character: Sharp, Dull ache, Stabbing, Gnawing, Burning, Fullness and Cramping  Location: right upper quadrant catrina-umbilical region  Radiation: None and sometimes to the Pelvic region    Intensity: 5-6/10 consent, stabbing pain is 8/10    Progression of Symptoms:  worsening    Accompanying Signs & Symptoms:  Fever/Chills?: no   Gas/Bloating: YES- more gas than normal  Nausea: no / no appitite  Vomitting: no   Diarrhea?: no   Constipation:no   Dysuria or Hematuria: no    History:   Trauma: YES- partial hysterectomy  Previous similar pain: no    Previous tests done: none    Precipitating factors:   Does the pain change with:     Food: YES- sometimes     BM: no     Urination: no     Alleviating factors:  nothing    Therapies Tried and outcome: Heat, topical  Medical marijuana helps    LMP:  not applicable   Initially thought pain might be due to coughing so hard and pulling a muscle.  Last week pain went from being sore to intermittently stabbing pain.  Hasn't had an appetite.  Has been trying to change eating habits and eating more \"whole food\" and not as much \"heavy food\".          Problem list and histories reviewed & adjusted, as indicated.  Additional history: as documented    Current Outpatient Medications   Medication Sig Dispense Refill     ascorbic acid (VITAMIN C) 1000 MG TABS Take 1 tablet (1,000 mg) by mouth daily 30 tablet      cholecalciferol (VITAMIN D3) 5000 UNITS TABS tablet Take by mouth daily       HM OMEGA-3-6-9 FATTY ACIDS PO        Probiotic Product (ACIDOPHILUS PROBIOTIC BLEND) CAPS        vitamin B complex with vitamin C (VITAMIN  B COMPLEX) TABS tablet Take 1 tablet by mouth daily  0     Allergies   Allergen Reactions     Flagyl [Metronidazole] Other (See Comments)     paresthesias       Reviewed and updated as " "needed this visit by clinical staff       Reviewed and updated as needed this visit by Provider         ROS:  Constitutional, HEENT, cardiovascular, pulmonary, gi and gu systems are negative, except as otherwise noted.    OBJECTIVE:     /78 (BP Location: Right arm, Patient Position: Sitting, Cuff Size: Adult Regular)   Pulse 76   Temp 98  F (36.7  C) (Oral)   Resp 18   Ht 1.651 m (5' 5\")   Wt 91.2 kg (201 lb)   LMP 09/26/2012   SpO2 96%   BMI 33.45 kg/m    Body mass index is 33.45 kg/m .  GENERAL: healthy, alert and no distress  RESP: lungs clear to auscultation - no rales, rhonchi or wheezes  CV: regular rate and rhythm, normal S1 S2, no S3 or S4, no murmur, click or rub, no peripheral edema and peripheral pulses strong  ABDOMEN: 4 small well healed incisions, soft, RUQ tender with palpation, no hepatosplenomegaly, no masses and bowel sounds normal, no rebound, no guarding   SKIN: no suspicious lesions or rashes  PSYCH: mentation appears normal, affect normal/bright    Diagnostic Test Results:  none     ASSESSMENT/PLAN:   1. RUQ abdominal pain  Persistent, worsening over the last week will check labs today.  Consider US based on labs.    - CBC with platelets and differential  - Comprehensive metabolic panel  - Lipase    2. Malignant neoplasm of cervix, unspecified site (H)  S/p partial hysterectomy.  Due for pap in May.      JENA [facility]    "

## 2019-01-25 LAB
ALBUMIN SERPL-MCNC: 3.5 G/DL (ref 3.4–5)
ALP SERPL-CCNC: 97 U/L (ref 40–150)
ALT SERPL W P-5'-P-CCNC: 40 U/L (ref 0–50)
ANION GAP SERPL CALCULATED.3IONS-SCNC: 7 MMOL/L (ref 3–14)
AST SERPL W P-5'-P-CCNC: 32 U/L (ref 0–45)
BILIRUB SERPL-MCNC: 0.4 MG/DL (ref 0.2–1.3)
BUN SERPL-MCNC: 9 MG/DL (ref 7–30)
CALCIUM SERPL-MCNC: 8.6 MG/DL (ref 8.5–10.1)
CHLORIDE SERPL-SCNC: 106 MMOL/L (ref 94–109)
CO2 SERPL-SCNC: 26 MMOL/L (ref 20–32)
CREAT SERPL-MCNC: 0.79 MG/DL (ref 0.52–1.04)
GFR SERPL CREATININE-BSD FRML MDRD: >90 ML/MIN/{1.73_M2}
GLUCOSE SERPL-MCNC: 88 MG/DL (ref 70–99)
POTASSIUM SERPL-SCNC: 3.9 MMOL/L (ref 3.4–5.3)
PROT SERPL-MCNC: 7.6 G/DL (ref 6.8–8.8)
SODIUM SERPL-SCNC: 139 MMOL/L (ref 133–144)

## 2019-01-30 ENCOUNTER — E-VISIT (OUTPATIENT)
Dept: FAMILY MEDICINE | Facility: CLINIC | Age: 44
End: 2019-01-30
Payer: COMMERCIAL

## 2019-01-30 DIAGNOSIS — J20.9 ACUTE BRONCHITIS, UNSPECIFIED ORGANISM: Primary | ICD-10-CM

## 2019-01-30 PROCEDURE — 99207 ZZC NO BILLABLE SERVICE THIS VISIT: CPT | Performed by: FAMILY MEDICINE

## 2019-01-31 ENCOUNTER — OFFICE VISIT (OUTPATIENT)
Dept: FAMILY MEDICINE | Facility: CLINIC | Age: 44
End: 2019-01-31
Payer: COMMERCIAL

## 2019-01-31 VITALS
OXYGEN SATURATION: 95 % | DIASTOLIC BLOOD PRESSURE: 74 MMHG | SYSTOLIC BLOOD PRESSURE: 110 MMHG | RESPIRATION RATE: 20 BRPM | TEMPERATURE: 98.3 F | BODY MASS INDEX: 33.61 KG/M2 | HEART RATE: 90 BPM | WEIGHT: 202 LBS

## 2019-01-31 DIAGNOSIS — M05.79 RHEUMATOID ARTHRITIS INVOLVING MULTIPLE SITES WITH POSITIVE RHEUMATOID FACTOR (H): ICD-10-CM

## 2019-01-31 DIAGNOSIS — J20.9 ACUTE BRONCHITIS WITH SYMPTOMS > 10 DAYS: Primary | ICD-10-CM

## 2019-01-31 PROBLEM — M06.9 RHEUMATOID ARTHRITIS (H): Status: ACTIVE | Noted: 2019-01-31

## 2019-01-31 PROCEDURE — 99213 OFFICE O/P EST LOW 20 MIN: CPT | Performed by: PHYSICIAN ASSISTANT

## 2019-01-31 RX ORDER — ALBUTEROL SULFATE 90 UG/1
2 AEROSOL, METERED RESPIRATORY (INHALATION) EVERY 6 HOURS
Qty: 1 INHALER | Refills: 0 | Status: SHIPPED | OUTPATIENT
Start: 2019-01-31 | End: 2019-12-04

## 2019-01-31 RX ORDER — ALBUTEROL SULFATE 90 UG/1
2 AEROSOL, METERED RESPIRATORY (INHALATION) EVERY 6 HOURS PRN
Qty: 1 INHALER | Refills: 1 | Status: SHIPPED | OUTPATIENT
Start: 2019-01-31 | End: 2019-12-04

## 2019-01-31 RX ORDER — DOXYCYCLINE HYCLATE 100 MG
100 TABLET ORAL 2 TIMES DAILY
Qty: 20 TABLET | Refills: 0 | Status: SHIPPED | OUTPATIENT
Start: 2019-01-31 | End: 2019-03-04

## 2019-01-31 RX ORDER — ALBUTEROL SULFATE 90 UG/1
2 AEROSOL, METERED RESPIRATORY (INHALATION) EVERY 4 HOURS PRN
Qty: 1 INHALER | Refills: 0 | Status: SHIPPED | OUTPATIENT
Start: 2019-01-31 | End: 2020-03-06

## 2019-01-31 RX ORDER — PREDNISONE 20 MG/1
20 TABLET ORAL DAILY
Qty: 5 TABLET | Refills: 0 | Status: SHIPPED | OUTPATIENT
Start: 2019-01-31 | End: 2019-02-07

## 2019-01-31 RX ORDER — CODEINE PHOSPHATE AND GUAIFENESIN 10; 100 MG/5ML; MG/5ML
SOLUTION ORAL
Qty: 118 ML | Refills: 0 | Status: SHIPPED | OUTPATIENT
Start: 2019-01-31 | End: 2019-12-04

## 2019-01-31 RX ORDER — DOXYCYCLINE 100 MG/1
100 CAPSULE ORAL 2 TIMES DAILY
Qty: 20 CAPSULE | Refills: 0 | Status: SHIPPED | OUTPATIENT
Start: 2019-01-31 | End: 2019-03-04

## 2019-01-31 NOTE — PROGRESS NOTES
SUBJECTIVE:   Oly Rush is a 43 year old female who presents to clinic today for the following health issues:      Acute Illness   Acute illness concerns: chest congestion  Onset: December    Fever: YES- on and off    Chills/Sweats: no    Headache (location?): YES    Sinus Pressure:YES    Conjunctivitis:  no    Ear Pain: YES: right    Rhinorrhea: YES    Congestion: YES    Sore Throat: no     Cough: YES-non-productive, productive of clear sputum, with shortness of breath, worsening over time    Wheeze: YES    Decreased Appetite: YES    Nausea: no    Vomiting: no    Diarrhea:  no    Dysuria/Freq.: no    Fatigue/Achiness: YES    Sick/Strep Exposure: YES - family     Therapies Tried and outcome: sudafed, mucinex DM, ibuprofen, essential oils    Patient was ill in the middle of December for about 3 weeks and recovered without treatment.  Was sinus, ear etc at that time. Now feeling ill again for about 8+ days.  Most of her family is ill as well.  Her daughter is due to give birth in about two weeks and she wants to be healthy if possible.  She feels now like she is having a cough, SOB, wheezing. Possibly low grade temp on and off.    Patient notes that fibromyalgia is currently very will controlled with medical marijuana- though she does not use it as much when ill.    Problem list and histories reviewed & adjusted, as indicated.  Additional history: as documented      Reviewed and updated as needed this visit by clinical staff  Tobacco  Allergies  Meds  Med Hx  Surg Hx  Fam Hx  Soc Hx      Reviewed and updated as needed this visit by Provider         ROS:  Constitutional, HEENT, cardiovascular, pulmonary, gi and gu systems are negative, except as otherwise noted.    OBJECTIVE:     /74 (BP Location: Right arm, Patient Position: Sitting, Cuff Size: Adult Regular)   Pulse 90   Temp 98.3  F (36.8  C) (Oral)   Resp 20   Wt 91.6 kg (202 lb)   LMP 09/26/2012   SpO2 95%   BMI 33.61 kg/m    Body  mass index is 33.61 kg/m .  GENERAL: healthy, alert and no distress  HENT: normal cephalic/atraumatic, ear canals and TM's normal, nose and mouth without ulcers or lesions, nasal mucosa edematous, oropharynx clear, oral mucous membranes moist and sinuses: maxillary tenderness on both sides  NECK: no adenopathy, no asymmetry, masses, or scars and thyroid normal to palpation  RESP:  no rales , rhonchi throughout and expiratory wheezes throughout  MS: no gross musculoskeletal defects noted, no edema  SKIN: no suspicious lesions or rashes  PSYCH: mentation appears normal, affect normal/bright    Diagnostic Test Results:  none     ASSESSMENT/PLAN:   1. Acute bronchitis with symptoms > 10 days  Recommended supportive cares including warm salt water gargles, Tylenol/Ibuprofen as directed OTC, rest, humidifier.  Follow-up if symptoms are worsening or not improving as expected/discussed.    - doxycycline hyclate (VIBRAMYCIN) 100 MG capsule; Take 1 capsule (100 mg) by mouth 2 times daily for 10 days  Dispense: 20 capsule; Refill: 0  - albuterol (PROAIR HFA/PROVENTIL HFA/VENTOLIN HFA) 108 (90 Base) MCG/ACT inhaler; Inhale 2 puffs into the lungs every 6 hours  Dispense: 1 Inhaler; Refill: 0  - guaiFENesin-codeine (ROBITUSSIN AC) 100-10 MG/5ML solution; 1-2 tsp PO qhs prn cough  Dispense: 118 mL; Refill: 0  - predniSONE (DELTASONE) 20 MG tablet; Take 20 mg by mouth daily for 7 days.  Dispense: 5 tablet; Refill: 0    2. Rheumatoid arthritis involving multiple sites with positive rheumatoid factor (H)  - follow with speciality if needed.  Continue medical marijuana as prescribed.          Jarret Zepeda PA-C  [facility]

## 2019-03-04 ENCOUNTER — ANCILLARY PROCEDURE (OUTPATIENT)
Dept: GENERAL RADIOLOGY | Facility: CLINIC | Age: 44
End: 2019-03-04
Attending: FAMILY MEDICINE
Payer: COMMERCIAL

## 2019-03-04 ENCOUNTER — OFFICE VISIT (OUTPATIENT)
Dept: FAMILY MEDICINE | Facility: CLINIC | Age: 44
End: 2019-03-04
Payer: COMMERCIAL

## 2019-03-04 VITALS
HEART RATE: 76 BPM | RESPIRATION RATE: 18 BRPM | HEIGHT: 65 IN | WEIGHT: 202.9 LBS | SYSTOLIC BLOOD PRESSURE: 116 MMHG | OXYGEN SATURATION: 97 % | TEMPERATURE: 98 F | DIASTOLIC BLOOD PRESSURE: 72 MMHG | BODY MASS INDEX: 33.8 KG/M2

## 2019-03-04 DIAGNOSIS — J40 BRONCHITIS: Primary | ICD-10-CM

## 2019-03-04 DIAGNOSIS — J40 BRONCHITIS: ICD-10-CM

## 2019-03-04 PROCEDURE — 99214 OFFICE O/P EST MOD 30 MIN: CPT | Performed by: FAMILY MEDICINE

## 2019-03-04 PROCEDURE — 71046 X-RAY EXAM CHEST 2 VIEWS: CPT

## 2019-03-04 RX ORDER — AZITHROMYCIN 250 MG/1
TABLET, FILM COATED ORAL
Qty: 6 TABLET | Refills: 0 | Status: SHIPPED | OUTPATIENT
Start: 2019-03-04 | End: 2019-12-04

## 2019-03-04 ASSESSMENT — MIFFLIN-ST. JEOR: SCORE: 1576.23

## 2019-03-04 NOTE — PROGRESS NOTES
SUBJECTIVE:   Oly Rush is a 43 year old female who presents to clinic today for the following health issues:    Pt was diagnosed with acute bronchitis on 19. Since, she has had acute stabbing pains in chest that have become unbearable. This has been intermittent. Also has complaint of shoulder blade pain, more so on left. Intermittent shortness of breath. Swallowing makes chest pain worse intermittently as well.     Back in Mid December she has a bad cold, then she went to [facility], and was started on abx, that she took fully, but she did not improve significantly, and until now she is still having cough on and off, shortness of breath that is worse with activity or SOB, wheezing, and does not feel back to normal.  Still having mild teeth pain.            Problem list and histories reviewed & adjusted, as indicated.  Additional history: as documented    Patient Active Problem List   Diagnosis     Migraine     Anxiety     CARDIOVASCULAR SCREENING; LDL GOAL LESS THAN 160     S/P abdominal hysterectomy and left salpingo-oophorectomy     Low back pain     Cervical cancer (H)     Fibromyalgia     Rheumatoid arthritis (H)     Past Surgical History:   Procedure Laterality Date     C NONSPECIFIC PROCEDURE      [facility] Biopsy of cervix     C/SECTION, LOW TRANSVERSE      , Low Transverse     C/SECTION, LOW TRANSVERSE  2004    , Low Transverse     DAVINCI HYSTERECTOMY TOTAL, BILATERAL SALPINGO-OOPHORECTOMY, COMBINED  10/2/2012    Procedure: COMBINED DAVINCI HYSTERECTOMY TOTAL, SALPINGO-OOPHORECTOMY;   DAVINCI  Supra cervical HYSTERECTOMY TOTAL, Bilateral Salpingectomies with Left Oophorectomy and Cystoscopy;  Surgeon: Manasa Santos DO;  Location:  OR       Social History     Tobacco Use     Smoking status: Never Smoker     Smokeless tobacco: Never Used   Substance Use Topics     Alcohol use: Yes     Alcohol/week: 0.0 oz     Comment: socially      Family History   Problem  Relation Age of Onset     Ovarian Cancer Maternal Aunt      Other Cancer Maternal Aunt         larynx cancer     C.A.D. No family hx of      Diabetes No family hx of      Hypertension No family hx of      Breast Cancer No family hx of      Cancer - colorectal No family hx of          Current Outpatient Medications   Medication Sig Dispense Refill     ascorbic acid (VITAMIN C) 1000 MG TABS Take 1 tablet (1,000 mg) by mouth daily 30 tablet      cholecalciferol (VITAMIN D3) 5000 UNITS TABS tablet Take by mouth daily       HM OMEGA-3-6-9 FATTY ACIDS PO        Probiotic Product (ACIDOPHILUS PROBIOTIC BLEND) CAPS        vitamin B complex with vitamin C (VITAMIN  B COMPLEX) TABS tablet Take 1 tablet by mouth daily  0     albuterol (PROAIR HFA/PROVENTIL HFA/VENTOLIN HFA) 108 (90 Base) MCG/ACT inhaler Inhale 2 puffs into the lungs every 4 hours as needed for shortness of breath / dyspnea or wheezing May substitute the equivalent medication per insurance preference. (Patient not taking: Reported on 1/31/2019) 1 Inhaler 0     albuterol (PROAIR HFA/PROVENTIL HFA/VENTOLIN HFA) 108 (90 Base) MCG/ACT inhaler Inhale 2 puffs into the lungs every 6 hours as needed for shortness of breath / dyspnea or wheezing (Patient not taking: Reported on 1/31/2019) 1 Inhaler 1     albuterol (PROAIR HFA/PROVENTIL HFA/VENTOLIN HFA) 108 (90 Base) MCG/ACT inhaler Inhale 2 puffs into the lungs every 6 hours (Patient not taking: Reported on 3/4/2019) 1 Inhaler 0     guaiFENesin-codeine (ROBITUSSIN AC) 100-10 MG/5ML solution 1-2 tsp PO qhs prn cough (Patient not taking: Reported on 3/4/2019) 118 mL 0       Reviewed and updated as needed this visit by clinical staff       Reviewed and updated as needed this visit by Provider         ROS:  CONSTITUTIONAL: NEGATIVE for fever, chills, change in weight  ENT/MOUTH: runny nose.  CV: NEGATIVE for chest pain, palpitations or peripheral edema    OBJECTIVE:     /72 (BP Location: Right arm, Patient Position:  "Chair, Cuff Size: Adult Regular)   Pulse 76   Temp 98  F (36.7  C) (Oral)   Resp 18   Ht 1.651 m (5' 5\")   Wt 92 kg (202 lb 14.4 oz)   LMP 09/26/2012   SpO2 97%   BMI 33.76 kg/m    Body mass index is 33.76 kg/m .  Head: Normocephalic, atraumatic.  Eyes: Conjunctiva clear, non icteric. PERRLA.  Ears: External ears nl, TM is nl   Nose: Septum midline, nasal mucosa congested. No discharge.  There is no tenderness over the maxillary sinuses, there is no tenderness over the frontal sinuses  Mouth / Throat: Normal dentition.  No oral lesions. Pharynx mild erythematous, tonsils no exudate/hypertrophy.  Neck: Supple, no enlarged LN, trachea midline.  LUNGS:  CTA B/L, no wheezing or crackles.  CVS : RRR, no murmur, no rub.            ASSESSMENT/PLAN:             1. Bronchitis  Symptoms are not improving in 1 month, still having shortness of breath, will add Albuterol (pt has inhaler at home) and start on Zithromax  Also check Chest xray  - XR Chest 2 Views; Future  - azithromycin (ZITHROMAX) 250 MG tablet; Take 2 pills today, then 1 pill for 4 days.  Dispense: 6 tablet; Refill: 0    Follow up in 5 days if symptoms persist, sooner if symptoms worsen or new ones develops, pt may contact us over the phone for any questions or concerns.      Shelli Echosl MD  [facility]"

## 2019-05-22 ENCOUNTER — APPOINTMENT (OUTPATIENT)
Dept: ULTRASOUND IMAGING | Facility: CLINIC | Age: 44
End: 2019-05-22
Attending: EMERGENCY MEDICINE
Payer: COMMERCIAL

## 2019-05-22 ENCOUNTER — HOSPITAL ENCOUNTER (EMERGENCY)
Facility: CLINIC | Age: 44
Discharge: HOME | End: 2019-05-22
Attending: EMERGENCY MEDICINE | Admitting: EMERGENCY MEDICINE
Payer: COMMERCIAL

## 2019-05-22 ENCOUNTER — APPOINTMENT (OUTPATIENT)
Dept: GENERAL RADIOLOGY | Facility: CLINIC | Age: 44
End: 2019-05-22
Attending: EMERGENCY MEDICINE
Payer: COMMERCIAL

## 2019-05-22 VITALS
HEART RATE: 62 BPM | OXYGEN SATURATION: 100 % | RESPIRATION RATE: 20 BRPM | SYSTOLIC BLOOD PRESSURE: 117 MMHG | TEMPERATURE: 97.8 F | DIASTOLIC BLOOD PRESSURE: 74 MMHG

## 2019-05-22 DIAGNOSIS — M79.622 PAIN IN LEFT AXILLA: ICD-10-CM

## 2019-05-22 LAB
ANION GAP SERPL CALCULATED.3IONS-SCNC: 6 MMOL/L (ref 3–14)
BUN SERPL-MCNC: 12 MG/DL (ref 7–30)
CALCIUM SERPL-MCNC: 8.8 MG/DL (ref 8.5–10.1)
CHLORIDE SERPL-SCNC: 106 MMOL/L (ref 94–109)
CO2 SERPL-SCNC: 25 MMOL/L (ref 20–32)
CREAT SERPL-MCNC: 0.82 MG/DL (ref 0.52–1.04)
GFR SERPL CREATININE-BSD FRML MDRD: 88 ML/MIN/{1.73_M2}
GLUCOSE SERPL-MCNC: 86 MG/DL (ref 70–99)
POTASSIUM SERPL-SCNC: 3.9 MMOL/L (ref 3.4–5.3)
SODIUM SERPL-SCNC: 137 MMOL/L (ref 133–144)
TROPONIN I SERPL-MCNC: <0.015 UG/L (ref 0–0.04)

## 2019-05-22 PROCEDURE — 71046 X-RAY EXAM CHEST 2 VIEWS: CPT

## 2019-05-22 PROCEDURE — 93971 EXTREMITY STUDY: CPT | Mod: LT

## 2019-05-22 PROCEDURE — 84484 ASSAY OF TROPONIN QUANT: CPT | Performed by: EMERGENCY MEDICINE

## 2019-05-22 PROCEDURE — 80048 BASIC METABOLIC PNL TOTAL CA: CPT | Performed by: EMERGENCY MEDICINE

## 2019-05-22 PROCEDURE — 93005 ELECTROCARDIOGRAM TRACING: CPT

## 2019-05-22 PROCEDURE — 99285 EMERGENCY DEPT VISIT HI MDM: CPT | Mod: 25

## 2019-05-22 RX ORDER — CYCLOBENZAPRINE HCL 10 MG
5-10 TABLET ORAL 3 TIMES DAILY PRN
Qty: 5 TABLET | Refills: 0 | Status: SHIPPED | OUTPATIENT
Start: 2019-05-22 | End: 2021-11-12

## 2019-05-22 NOTE — ED TRIAGE NOTES
Patient complaining of left armpit pain that has been worsening over the past 5 days.  States it radiates into left shoulder blade.      Denies a wound or drainage.      ABCs intact.  Alert and oriented x 3.

## 2019-05-22 NOTE — ED PROVIDER NOTES
History     Chief Complaint:  Arm Pain    The history is provided by the patient.      Oly Rush is a 44 year old female with a history of fibromyalgia and migraines who presents with her wife for evaluation of arm pain. Six days ago, patient had the onset of constant left axillary pain that radiates to her left shoulder blade. She feels like her arm is constricted and she describes pain as if someone placed a hand on her shoulder and arm and is pushing. She has tried ibuprofen for the pain without improvement of her symptoms. She also has associated swelling and numbness in the left arm, though mentions she has some baseline numbness in the left arm after a reaction to flagyl several years ago, so she is unsure of how much numbness is new. She has a history of axillary issues and has since switched to natural deodorants but her current pain is more severe than any prior pain, which is concerning to her, prompting her to seek evaluation here in the ED.      Here, she continues to complain of the left arm/axillary pain and numbness. She also reports some residual shortness of breath and wheezing from recent bronchitis in March 2019, noting she has never fully recovered from this. She does use medical marijuana and since starting this, she also been vapping and thinks this may be contributing to her shortness of breath. No new leg swelling or chest pain.     Cardiac/PE/DVT Risk Factors:  The patient denies a history of hypertension, hyperlipidemia, diabetes, and tobacco smoking. She reports a family history of heart disease. The patient denies any personal or familial history of PE, DVT, or clotting disorder. The patient reports recent travel last week, driving to [place]. She has a history of  Cervical cancer. No use of hormones.     Allergies:  Flagyl     Medications:    Albuterol  Vitamin D3  Robitussin  Probiotics    Past Medical History:    Rheumatoid arthritis  Fibromyalgia  Cervical  cancer  Migraines  Anxiety  Abnormal pap smear     Past Surgical History:    Cervical biopsy   x 2   Hysterectomy total, with bilateral salpingo-oophorectomy    Family History:    Ovarian cancer  Larynx cancer    Social History:  Negative for tobacco use.  Positive for alcohol use.  Negative for drug use.  Patient presents with her wife.  Marital Status:       Review of Systems   Cardiovascular: Negative for chest pain and leg swelling.   Musculoskeletal:        Positive for left axillary pain.     All other systems reviewed and are negative.    Physical Exam     Patient Vitals for the past 24 hrs:   BP Temp Temp src Pulse Resp SpO2   19 1739 -- -- -- -- -- 100 %   19 1738 117/74 -- -- 62 -- --   19 1600 (!) 138/95 -- -- -- -- 99 %   19 1557 (!) 138/95 97.8  F (36.6  C) Temporal 73 20 99 %       Physical Exam  Constitutional: Alert, attentive, GCS 15  HENT:    Nose: Nose normal.    Mouth/Throat: Oropharynx is clear, mucous membranes are moist.  Eyes: EOM are normal, anicteric, conjugate gaze  CV: regular rate and rhythm; no murmurs  Chest: Effort normal and breath sounds clear without wheezing or rales, symmetric bilaterally   GI:  non tender. No distension. No guarding or rebound.    MSK: Tenderness on the left anterior axillary line without any significant chest wall tenderness, no lymphadenopathy and no fluctuance or skin changes  Neurological: Alert, attentive, moving all extremities equally.   Skin: Skin is warm and dry.    Emergency Department Course   ECG:  Indication: arm pain  Time: 1613  Vent. Rate 77 bpm. MO interval 168. QRS duration 82. QT/QTc 384/434. P-R-T axis 63 -7 28. Normal sinus rhythm with sinus arrhythmia. Normal ECG. Agree with computer interpretation. No significant change compared to 3/22/2018. Time read: 1613.    Imaging:  Radiographic findings were communicated with the patient who voiced understanding of the findings.    XR Chest  2 views:   No  infiltrates or other acute findings. Heart size is  within normal limits. As per radiology.     US upper extremity venous duplex left:  No DVT identified left upper extremity. As per radiology.     Laboratory:  BMP: WNL (Creatinine: 0.82)  1619 Troponin I: <0.015    Emergency Department Course:  1618 Nursing notes and vitals reviewed. I performed an exam of the patient as documented above.     Blood drawn. This was sent to the lab for further testing, results above.    EKG obtained in the ED, see results above.     The patient was sent for a chest x-ray and Upper left extremity venous deplux ultrasound while in the emergency department, findings above.     1731 I rechecked the patient and discussed the results of her workup thus far.     Findings and plan explained to the Patient. Patient discharged home with instructions regarding supportive care, medications, and reasons to return. The importance of close follow-up was reviewed. The patient was prescribed cyclobenzaprine.    I personally reviewed the laboratory results with the Patient and answered all related questions prior to discharge.  Impression & Plan    Medical Decision Makin-year-old female past medical history significant for fibromyalgia, anxiety presenting for evaluation of pain in her left axilla exacerbated mostly with range of motion of her left arm but no report of trauma.  EKG shows no evidence of ischemia, troponin negative ACS given 5 days of constant symptoms and she has no significant risk factors for early MI.  Left upper extremity ultrasound negative for DVT and I have low suspicion for PE, she is otherwise PERC negative.  X-ray of her chest is clear as well.  On exam her pain is fully reproducible with palpation anterior axillary line and is exacerbated with range of motion of her arm consistent with likely musculoskeletal etiology.  I recommended conservative management including Tylenol, ibuprofen and warm compress as needed for  comfort.  There is no evidence of lymphadenopathy or abscess at this time.     Diagnosis:    ICD-10-CM    1. Pain in left axilla M79.622        Disposition:  discharged to home     Discharge Medications:     Medication List      Started    * cyclobenzaprine 10 MG tablet  Commonly known as:  FLEXERIL  5-10 mg, Oral, 3 TIMES DAILY PRN          Giuliano Bynum MD   [employer]  10:46 PM 05/22/19     Scribe Disposition  I, Valeria Carrero, am serving as a scribe on 5/22/2019 at 6:16 PM to personally document services performed by Giuliano Bynum MD based on my observations and the provider's statements to me.    Valeria Carrero  5/22/2019   [facility] EMERGENCY DEPARTMENT       Giuliano Bynum MD  05/22/19 [id number]

## 2019-05-22 NOTE — DISCHARGE INSTRUCTIONS
Take alternating dose of Tylenol and ibuprofen along with applying heat pack to left arm for comfort.  You should return the emergency department should you have worsening pain, swelling of your arm shortness of breath or chest pain.

## 2019-05-22 NOTE — ED AVS SNAPSHOT
[facility] Emergency Department  [address]  [facility] [address]  Phone:  [phone]  Fax:  [phone]                                    Oly Rush   MRN: [id number]    Department:  [facility] Emergency Department   Date of Visit:  5/22/2019           After Visit Summary Signature Page    I have received my discharge instructions, and my questions have been answered. I have discussed any challenges I see with this plan with the nurse or doctor.    ..........................................................................................................................................  Patient/Patient Representative Signature      ..........................................................................................................................................  Patient Representative Print Name and Relationship to Patient    ..................................................               ................................................  Date                                   Time    ..........................................................................................................................................  Reviewed by Signature/Title    ...................................................              ..............................................  Date                                               Time          22EPIC Rev 08/18

## 2019-05-23 LAB — INTERPRETATION ECG - MUSE: NORMAL

## 2019-08-26 ENCOUNTER — ANCILLARY PROCEDURE (OUTPATIENT)
Dept: GENERAL RADIOLOGY | Facility: CLINIC | Age: 44
End: 2019-08-26
Attending: FAMILY MEDICINE
Payer: COMMERCIAL

## 2019-08-26 ENCOUNTER — OFFICE VISIT (OUTPATIENT)
Dept: ORTHOPEDICS | Facility: CLINIC | Age: 44
End: 2019-08-26
Payer: COMMERCIAL

## 2019-08-26 VITALS
BODY MASS INDEX: 34.59 KG/M2 | DIASTOLIC BLOOD PRESSURE: 88 MMHG | HEIGHT: 65 IN | SYSTOLIC BLOOD PRESSURE: 122 MMHG | WEIGHT: 207.6 LBS

## 2019-08-26 DIAGNOSIS — G89.29 CHRONIC LEFT SHOULDER PAIN: Primary | ICD-10-CM

## 2019-08-26 DIAGNOSIS — M75.22 BICEPS TENDINITIS OF LEFT UPPER EXTREMITY: ICD-10-CM

## 2019-08-26 DIAGNOSIS — M79.7 FIBROMYALGIA: ICD-10-CM

## 2019-08-26 DIAGNOSIS — M25.512 CHRONIC LEFT SHOULDER PAIN: Primary | ICD-10-CM

## 2019-08-26 DIAGNOSIS — G89.29 CHRONIC LEFT SHOULDER PAIN: ICD-10-CM

## 2019-08-26 DIAGNOSIS — M25.512 CHRONIC LEFT SHOULDER PAIN: ICD-10-CM

## 2019-08-26 PROCEDURE — 73030 X-RAY EXAM OF SHOULDER: CPT | Mod: LT

## 2019-08-26 PROCEDURE — 99204 OFFICE O/P NEW MOD 45 MIN: CPT | Performed by: FAMILY MEDICINE

## 2019-08-26 RX ORDER — MELOXICAM 15 MG/1
15 TABLET ORAL DAILY
Qty: 30 TABLET | Refills: 1 | Status: SHIPPED | OUTPATIENT
Start: 2019-08-26 | End: 2020-03-06

## 2019-08-26 ASSESSMENT — MIFFLIN-ST. JEOR: SCORE: 1592.55

## 2019-08-26 NOTE — LETTER
8/26/2019         RE: Oly Rush  [address]  [facility] [address]        Dear Colleague,    Thank you for referring your patient, [facility]ly Rush[facility]. Please see a copy of my visit note below.    ASSESSMENT & PLAN  Patient Instructions     1. Chronic left shoulder pain    2. Biceps tendinitis of left upper extremity    3. Fibromyalgia      -Patient has left shoulder pain and tightness due to biceps tendinitis  -Patient will start formal physical therapy and home exercise program.  Patient wishes to have [place] treatments and so will be scheduled with specific therapist here [place].  -Patient will start meloxicam 15 mg p.o. daily as needed  -Patient also has chronic fibromyalgia.  Referral to pain management for nonnarcotic pain control was ordered today.  -Patient will follow-up in 4 weeks for reevaluation.  If no improvement to consider cortisone injection in the biceps tendon sheath.  -Call direct clinic number [[phone]] at any time with questions or concerns.    Albert Yeo MD [facility]  [facility] [place]          -----    SUBJECTIVE  Oly Rush is a/an 44 year old Right handed female who is seen in consultation at the request of  Carrie Prasad M.D. for evaluation of right shoulder pain. The patient is seen by themselves.    Onset: 1 years(s) ago. Reports insidious onset without acute precipitating event.  Location of Pain: left anterior and posterior shoulder, left axilla  Rating of Pain at worst: 7/10  Rating of Pain Currently: 2/10  Worsened by: abduction and external rotation, overhead movements  Better with: rest/activity avoidance  Treatments tried: rest/activity avoidance, ice, heat, ibuprofen, stretching/home exercises provided by chiropractor, compression sleeve for arm, tennis elbow strap  Associated symptoms: numbness and tingling which begins in the middle of  upper arm and radiates into left hand, patient notes swelling in left arm  Orthopedic history: Patient has fibromyalgia  Relevant surgical history: NO  Social history: social history: works at [employer]    Past Medical History:   Diagnosis Date     Abnormal Pap smear     cone bx     Anxiety      Arthritis      Cancer (H)      Social History     Socioeconomic History     Marital status:      Spouse name: Not on file     Number of children: 2     Years of education: Not on file     Highest education level: Not on file   Occupational History     Employer: [employer]   Social Needs     Financial resource strain: Not on file     Food insecurity:     Worry: Not on file     Inability: Not on file     Transportation needs:     Medical: Not on file     Non-medical: Not on file   Tobacco Use     Smoking status: Never Smoker     Smokeless tobacco: Never Used   Substance and Sexual Activity     Alcohol use: Yes     Alcohol/week: 0.0 oz     Comment: socially      Drug use: No     Sexual activity: Yes     Partners: Female   Lifestyle     Physical activity:     Days per week: Not on file     Minutes per session: Not on file     Stress: Not on file   Relationships     Social connections:     Talks on phone: Not on file     Gets together: Not on file     Attends Hindu service: Not on file     Active member of club or organization: Not on file     Attends meetings of clubs or organizations: Not on file     Relationship status: Not on file     Intimate partner violence:     Fear of current or ex partner: Not on file     Emotionally abused: Not on file     Physically abused: Not on file     Forced sexual activity: Not on file   Other Topics Concern     Parent/sibling w/ CABG, MI or angioplasty before 65F 55M? No      Service Not Asked     Blood Transfusions Not Asked     Caffeine Concern Not Asked     Occupational Exposure Not Asked     Hobby Hazards Not Asked     Sleep Concern  "Not Asked     Stress Concern Yes     Weight Concern Not Asked     Special Diet Not Asked     Back Care Not Asked     Exercise Yes     Bike Helmet Not Asked     Seat Belt Yes     Self-Exams Not Asked   Social History Narrative     Not on file         Patient's past medical, surgical, social, and family histories were reviewed today and no changes are noted.    REVIEW OF SYSTEMS:  10 point ROS is negative other than symptoms noted above in HPI, Past Medical History or as stated below  Constitutional: NEGATIVE for fever, chills, change in weight  Skin: NEGATIVE for worrisome rashes, moles or lesions  GI/: NEGATIVE for bowel or bladder changes  Neuro: NEGATIVE for weakness, dizziness or paresthesias    OBJECTIVE:  /88   Ht 1.651 m (5' 5\")   Wt 94.2 kg (207 lb 9.6 oz)   LMP 09/26/2012   BMI 34.55 kg/m      General: healthy, alert and in no distress  HEENT: no scleral icterus or conjunctival erythema  Skin: no suspicious lesions or rash. No jaundice.  CV: regular rhythm by palpation  Resp: normal respiratory effort without conversational dyspnea   Psych: normal mood and affect  Gait: normal steady gait with appropriate coordination and balance  Neuro: normal light touch sensory exam of the bilateral upper extremities.    MSK:  LEFT SHOULDER  Inspection:    no swelling, bruising, discoloration, or obvious deformity or asymmetry  Palpation:    Tender about the proximal biceps tendon. Remainder of bony and tendinous landmarks are nontender.  Active Range of Motion:     Abduction 1350, FF 1650, , IR L4.      Scapular dyskinesis absent  Strength:    Scapular plane abduction 5-/5,  ER 5/5, IR 5/5, biceps 5/5, triceps 5/5  Special Tests:    Positive: [place]'s, cross arm and Speed's    Negative: Neer's, Reyna', supraspinatus (empty can), drop arm/painful arc and Yergason's        Independent visualization of the below image:  Recent Results (from the past 24 hour(s))   XR Shoulder Left G/E 3 Views    " Narrative    XR SHOULDER LT G/E 3 VW   8/26/2019 1:26 PM     HISTORY:  Chronic left shoulder pain    COMPARISON: None      Impression    IMPRESSION: No acute fracture. There is normal joint spacing and  alignment.    MOISE SHEARER MD     Results for orders placed or performed during the hospital encounter of 05/22/19   US Upper Extremity Venous Duplex Left    Narrative    LEFT UPPER EXTREMITY VENOUS DUPLEX ULTRASOUND  5/22/2019 5:10 PM     HISTORY: Left arm pain.    TECHNIQUE: Venous Doppler US of the upper extremity.? Color flow and  spectral Doppler with waveform analysis performed.    COMPARISON: None.    FINDINGS: Ultrasound of the left upper extremity demonstrates no deep  vein thrombus in the subclavian, axillary or brachial veins. No  thrombus seen in the cephalic or basilic veins or visualized portions  of internal jugular vein. No thrombus seen in the visualized segments  of the radial or ulnar veins.      Impression    IMPRESSION: No DVT identified left upper extremity.    RICHARD THOMPSON, MD Albert Yeo MD [facility]      Again, thank you for allowing me to participate in the care of your patient.        Sincerely,        Albert Yeo, MD

## 2019-08-26 NOTE — PATIENT INSTRUCTIONS
1. Chronic left shoulder pain    2. Biceps tendinitis of left upper extremity    3. Fibromyalgia      -Patient has left shoulder pain and tightness due to biceps tendinitis  -Patient will start formal physical therapy and home exercise program.  Patient wishes to have [place] treatments and so will be scheduled with specific therapist here [place].  -Patient will start meloxicam 15 mg p.o. daily as needed  -Patient also has chronic fibromyalgia.  Referral to pain management for nonnarcotic pain control was ordered today.  -Patient will follow-up in 4 weeks for reevaluation.  If no improvement to consider cortisone injection in the biceps tendon sheath.  -Call direct clinic number [[phone]] at any time with questions or concerns.    Albert Yeo MD [facility]  [facility] [place]

## 2019-08-26 NOTE — PROGRESS NOTES
ASSESSMENT & PLAN  Patient Instructions     1. Chronic left shoulder pain    2. Biceps tendinitis of left upper extremity    3. Fibromyalgia      -Patient has left shoulder pain and tightness due to biceps tendinitis  -Patient will start formal physical therapy and home exercise program.  Patient wishes to have [place] treatments and so will be scheduled with specific therapist here [place].  -Patient will start meloxicam 15 mg p.o. daily as needed  -Patient also has chronic fibromyalgia.  Referral to pain management for nonnarcotic pain control was ordered today.  -Patient will follow-up in 4 weeks for reevaluation.  If no improvement to consider cortisone injection in the biceps tendon sheath.  -Call direct clinic number [[phone]] at any time with questions or concerns.    Albert Yeo MD [facility]  [facility]          -----    SUBJECTIVE  Oly Rush is a/an 44 year old Right handed female who is seen in consultation at the request of  Carrie Prasad M.D. for evaluation of right shoulder pain. The patient is seen by themselves.    Onset: 1 years(s) ago. Reports insidious onset without acute precipitating event.  Location of Pain: left anterior and posterior shoulder, left axilla  Rating of Pain at worst: 7/10  Rating of Pain Currently: 2/10  Worsened by: abduction and external rotation, overhead movements  Better with: rest/activity avoidance  Treatments tried: rest/activity avoidance, ice, heat, ibuprofen, stretching/home exercises provided by chiropractor, compression sleeve for arm, tennis elbow strap  Associated symptoms: numbness and tingling which begins in the middle of upper arm and radiates into left hand, patient notes swelling in left arm  Orthopedic history: Patient has fibromyalgia  Relevant surgical history: NO  Social history: social history: works at [employer]    Past Medical History:   Diagnosis  Date     Abnormal Pap smear     cone bx     Anxiety      Arthritis      Cancer (H)      Social History     Socioeconomic History     Marital status:      Spouse name: Not on file     Number of children: 2     Years of education: Not on file     Highest education level: Not on file   Occupational History     Employer: [employer]   Social Needs     Financial resource strain: Not on file     Food insecurity:     Worry: Not on file     Inability: Not on file     Transportation needs:     Medical: Not on file     Non-medical: Not on file   Tobacco Use     Smoking status: Never Smoker     Smokeless tobacco: Never Used   Substance and Sexual Activity     Alcohol use: Yes     Alcohol/week: 0.0 oz     Comment: socially      Drug use: No     Sexual activity: Yes     Partners: Female   Lifestyle     Physical activity:     Days per week: Not on file     Minutes per session: Not on file     Stress: Not on file   Relationships     Social connections:     Talks on phone: Not on file     Gets together: Not on file     Attends Sabianist service: Not on file     Active member of club or organization: Not on file     Attends meetings of clubs or organizations: Not on file     Relationship status: Not on file     Intimate partner violence:     Fear of current or ex partner: Not on file     Emotionally abused: Not on file     Physically abused: Not on file     Forced sexual activity: Not on file   Other Topics Concern     Parent/sibling w/ CABG, MI or angioplasty before 65F 55M? No      Service Not Asked     Blood Transfusions Not Asked     Caffeine Concern Not Asked     Occupational Exposure Not Asked     Hobby Hazards Not Asked     Sleep Concern Not Asked     Stress Concern Yes     Weight Concern Not Asked     Special Diet Not Asked     Back Care Not Asked     Exercise Yes     Bike Helmet Not Asked     Seat Belt Yes     Self-Exams Not Asked   Social History Narrative     Not on file         Patient's past  "medical, surgical, social, and family histories were reviewed today and no changes are noted.    REVIEW OF SYSTEMS:  10 point ROS is negative other than symptoms noted above in HPI, Past Medical History or as stated below  Constitutional: NEGATIVE for fever, chills, change in weight  Skin: NEGATIVE for worrisome rashes, moles or lesions  GI/: NEGATIVE for bowel or bladder changes  Neuro: NEGATIVE for weakness, dizziness or paresthesias    OBJECTIVE:  /88   Ht 1.651 m (5' 5\")   Wt 94.2 kg (207 lb 9.6 oz)   LMP 09/26/2012   BMI 34.55 kg/m     General: healthy, alert and in no distress  HEENT: no scleral icterus or conjunctival erythema  Skin: no suspicious lesions or rash. No jaundice.  CV: regular rhythm by palpation  Resp: normal respiratory effort without conversational dyspnea   Psych: normal mood and affect  Gait: normal steady gait with appropriate coordination and balance  Neuro: normal light touch sensory exam of the bilateral upper extremities.    MSK:  LEFT SHOULDER  Inspection:    no swelling, bruising, discoloration, or obvious deformity or asymmetry  Palpation:    Tender about the proximal biceps tendon. Remainder of bony and tendinous landmarks are nontender.  Active Range of Motion:     Abduction 1350, FF 1650, , IR L4.      Scapular dyskinesis absent  Strength:    Scapular plane abduction 5-/5,  ER 5/5, IR 5/5, biceps 5/5, triceps 5/5  Special Tests:    Positive: [place]'s, cross arm and Speed's    Negative: Neer's, Reyna', supraspinatus (empty can), drop arm/painful arc and Yergason's        Independent visualization of the below image:  Recent Results (from the past 24 hour(s))   XR Shoulder Left G/E 3 Views    Narrative    XR SHOULDER LT G/E 3 VW   8/26/2019 1:26 PM     HISTORY:  Chronic left shoulder pain    COMPARISON: None      Impression    IMPRESSION: No acute fracture. There is normal joint spacing and  alignment.    MOISE SHEARER MD     Results for orders placed or " performed during the hospital encounter of 05/22/19   US Upper Extremity Venous Duplex Left    Narrative    LEFT UPPER EXTREMITY VENOUS DUPLEX ULTRASOUND  5/22/2019 5:10 PM     HISTORY: Left arm pain.    TECHNIQUE: Venous Doppler US of the upper extremity.? Color flow and  spectral Doppler with waveform analysis performed.    COMPARISON: None.    FINDINGS: Ultrasound of the left upper extremity demonstrates no deep  vein thrombus in the subclavian, axillary or brachial veins. No  thrombus seen in the cephalic or basilic veins or visualized portions  of internal jugular vein. No thrombus seen in the visualized segments  of the radial or ulnar veins.      Impression    IMPRESSION: No DVT identified left upper extremity.    RICHARD THOMPSON, MD Albert Yeo MD [facility] [place] [facility]

## 2019-09-11 ENCOUNTER — OFFICE VISIT (OUTPATIENT)
Dept: PALLIATIVE MEDICINE | Facility: CLINIC | Age: 44
End: 2019-09-11
Payer: COMMERCIAL

## 2019-09-11 ENCOUNTER — THERAPY VISIT (OUTPATIENT)
Dept: PHYSICAL THERAPY | Facility: CLINIC | Age: 44
End: 2019-09-11
Attending: FAMILY MEDICINE
Payer: COMMERCIAL

## 2019-09-11 VITALS — HEART RATE: 77 BPM | SYSTOLIC BLOOD PRESSURE: 109 MMHG | DIASTOLIC BLOOD PRESSURE: 71 MMHG | OXYGEN SATURATION: 95 %

## 2019-09-11 DIAGNOSIS — M75.22 BICEPS TENDINITIS OF LEFT UPPER EXTREMITY: ICD-10-CM

## 2019-09-11 DIAGNOSIS — M79.7 FIBROMYALGIA: Primary | ICD-10-CM

## 2019-09-11 PROCEDURE — 97162 PT EVAL MOD COMPLEX 30 MIN: CPT | Mod: GP | Performed by: PHYSICAL THERAPIST

## 2019-09-11 PROCEDURE — 99204 OFFICE O/P NEW MOD 45 MIN: CPT | Performed by: PHYSICAL MEDICINE & REHABILITATION

## 2019-09-11 PROCEDURE — 97110 THERAPEUTIC EXERCISES: CPT | Mod: GP | Performed by: PHYSICAL THERAPIST

## 2019-09-11 PROCEDURE — 97530 THERAPEUTIC ACTIVITIES: CPT | Mod: GP | Performed by: PHYSICAL THERAPIST

## 2019-09-11 ASSESSMENT — PAIN SCALES - GENERAL: PAINLEVEL: MILD PAIN (3)

## 2019-09-11 NOTE — PROGRESS NOTES
[facility] Consultation    Date of visit: 9/11/2019    Reason for consultation:    Oly Rush is a 44 year old female who is seen in consultation today at the request of Albert Yeo, [place].    Consultation and Evaluation for: Fibromyalgia    Please see the [facility] health questionnaire which the patient completed and reviewed with me in detail.    Review of [place] Prescription Monitoring Program (): No concern for abuse or misuse of controlled medications based on this report.     Pain medications are being prescribed by NA.     Oly Rush has not been seen at a pain clinic in the past.      Chief Complaint:    Chief Complaint   Patient presents with     Pain     Pain history:  Oly Rush is a 44 year old female who presents for initial evaluation of chief pain complaint of widespread pain. She was diagnosed with fibromyalgia by Rheumatology in 2017. Reports that her pain has been ongoing for at least 4 years though. She reports having widespread myalgias and fatigue. More recently has been having acute left shoulder pain for which she is seeing sports medicine. Her pain is aching and sometimes shooting in quality. Varies in severity. Severity/Intensity: 3/10 at best, 7/10 at worst, 6/10 on average. It is aggravated by hard physical activity, laying down, reaching above her head. It is relieved with using the hot tub and light massage. She states that she is not limited physically but thinks that she may be pushing herself too hard because she gets fatigued afterwards.    She is not interested in medication management for fibromyalgia. She prefers to use more natural therapies and supplements. She found a clinic in [place] that can offer the type of therapies she is looking for and plans on scheduling an appointment with them.      Pain Treatments:  (H--helped; HI--Helped initially; [facility]--Somewhat helpful; NH--No help;  W--worse; SE--side effects; ?--Unsure if helpful)   1. Medications:       Current pain medications:   Cyclobenzaprine 5-10 mg TID prn - very rarely - H   Medical cannabis - H (has been on it for little more than 1 year. Feels like affects memory. Now using 3 times per week before bed)   Ibuprofen prn - [facility]   CBD topical - H   Lidocaine patches - transient benefit     Current calculated MME: 0    1. Previous Pain Relevant Medications:  NOTE: This medication information taken from patient's intake form, not medical records.    Opiates: None   NSAIDS: None    Muscle Relaxants: None   Anti-migraine mediations: None   Anti-depressants: None   Sleep aids:None   Anxiolytics: None   Neuropathics:     Topicals: None   Other medications not covered above: None    2. Physical Therapy: Yes - currently doing for left shoulder  3. Pain Psychology: None. Has a chronic pain meditation guide at home that she is using.   4. Surgery: None  5. Injections: None  6. Chiropractic: Yes - H when back is more painful.   7. Acupuncture: Yes - can be helpful depending on pain level at the time. Last time was in 2019.   8. TENS Unit: Yes - H  9. Other: massage - NH, took about 1 month to recover from that. Likes light massage but does not tolerate deep tissue massage.     Diagnostic tests:  X-ray of left shoulder:   IMPRESSION: No acute fracture. There is normal joint spacing and  alignment.    EMG/Testing:  None    Labs:   Creatinine 0.82, GFR 88    Past Medical History:  Past Medical History:   Diagnosis Date     Abnormal Pap smear     cone bx     Anxiety      Arthritis      Cancer (H)        Past Surgical History:  Past Surgical History:   Procedure Laterality Date     C NONSPECIFIC PROCEDURE      Cone Biopsy of cervix     C/SECTION, LOW TRANSVERSE      , Low Transverse     C/SECTION, LOW TRANSVERSE  2004    , Low Transverse     DAVINCI HYSTERECTOMY TOTAL, BILATERAL SALPINGO-OOPHORECTOMY, COMBINED  10/2/2012     Procedure: COMBINED DAVINCI HYSTERECTOMY TOTAL, SALPINGO-OOPHORECTOMY;   DAVINCI  Supra cervical HYSTERECTOMY TOTAL, Bilateral Salpingectomies with Left Oophorectomy and Cystoscopy;  Surgeon: Manasa Santos DO;  Location: [facility] OR       Medications:  Current Outpatient Medications   Medication Sig Dispense Refill     albuterol (PROAIR HFA/PROVENTIL HFA/VENTOLIN HFA) 108 (90 Base) MCG/ACT inhaler Inhale 2 puffs into the lungs every 4 hours as needed for shortness of breath / dyspnea or wheezing May substitute the equivalent medication per insurance preference. (Patient not taking: Reported on 1/31/2019) 1 Inhaler 0     albuterol (PROAIR HFA/PROVENTIL HFA/VENTOLIN HFA) 108 (90 Base) MCG/ACT inhaler Inhale 2 puffs into the lungs every 6 hours as needed for shortness of breath / dyspnea or wheezing (Patient not taking: Reported on 1/31/2019) 1 Inhaler 1     albuterol (PROAIR HFA/PROVENTIL HFA/VENTOLIN HFA) 108 (90 Base) MCG/ACT inhaler Inhale 2 puffs into the lungs every 6 hours (Patient not taking: Reported on 3/4/2019) 1 Inhaler 0     ascorbic acid (VITAMIN C) 1000 MG TABS Take 1 tablet (1,000 mg) by mouth daily 30 tablet      azithromycin (ZITHROMAX) 250 MG tablet Take 2 pills today, then 1 pill for 4 days. 6 tablet 0     cholecalciferol (VITAMIN D3) 5000 UNITS TABS tablet Take by mouth daily       cyclobenzaprine (FLEXERIL) 10 MG tablet Take 0.5-1 tablets (5-10 mg) by mouth 3 times daily as needed for muscle spasms 5 tablet 0     guaiFENesin-codeine (ROBITUSSIN AC) 100-10 MG/5ML solution 1-2 tsp PO qhs prn cough (Patient not taking: Reported on 3/4/2019) 118 mL 0     HM OMEGA-3-6-9 FATTY ACIDS PO        meloxicam (MOBIC) 15 MG tablet Take 1 tablet (15 mg) by mouth daily 30 tablet 1     Probiotic Product (ACIDOPHILUS PROBIOTIC BLEND) CAPS        vitamin B complex with vitamin C (VITAMIN  B COMPLEX) TABS tablet Take 1 tablet by mouth daily  0       Allergies:     Allergies   Allergen Reactions     Flagyl  [Metronidazole] Other (See Comments)     paresthesias       Social History:  Home situation: Lives in [place], [place].  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   Tobacco use: none  Drug use: none  Alcohol use: 1-2 drinks per month    Family history:  Family History   Problem Relation Age of Onset     Ovarian Cancer Maternal Aunt      Other Cancer Maternal Aunt         larynx cancer     C.A.D. No family hx of      Diabetes No family hx of      Hypertension No family hx of      Breast Cancer No family hx of      Cancer - colorectal No family hx of        Review of Systems:    POSTIVE IN BOLD  GENERAL: fever/chills, fatigue, general unwell feeling, weight gain/loss.  HEAD/EYES:  headache, dizziness, or vision changes.    EARS/NOSE/THROAT:  Nosebleeds, hearing loss, sinus infection, earache, tinnitus.  IMMUNE:  Allergies, cancer, immune deficiency, or infections.  SKIN:  Urticaria, rash, hives  HEME/Lymphatic:   anemia, easy bruising, easy bleeding.  RESPIRATORY:  cough, wheezing, or shortness of breath  CARDIOVASCULAR/Circulation:  Extremity edema, syncope, hypertension, tachycardia, or angina.  GASTROINTESTINAL:  abdominal pain, nausea/emesis, diarrhea, constipation,  hematochezia, or melena.  ENDOCRINE:  Diabetes, steroid use,  thyroid disease or osteoporosis.  MUSCULOSKELETAL: neck pain, back pain, arthralgia, arthritis, or gout.  GENITOURINARY:  frequency, urgency, dysuria, difficulty voiding, hematuria or incontinence.  NEUROLOGIC:  weakness, numbness, paresthesias, seizure, tremor, stroke or memory loss.  PSYCHIATRIC:  depression, anxiety, stress, suicidal thoughts or mood swings.     Physical Exam:  Vitals:    09/11/19 1059   BP: 109/71   Pulse: 77   SpO2: 95%     Exam:  Constitutional: Well developed, well nourished, appears stated age.  HEENT: Head atraumatic, normocephalic. Eyes without conjunctival injection or jaundice. Oropharynx clear. Neck supple. No obvious neck  masses.  Respiratory: Breathing unlabored on room air.   Gastrointestinal: Abdomen soft, non-tender, without guarding/rebound.  Skin: No rash, lesions, or petechiae of exposed skin.   Extremities:  No clubbing, cyanosis, or edema.  Psychiatric/mental status: Alert, without lethargy or stupor. Speech fluent. Appropriate affect. Mood normal. Able to follow commands without difficulty.     Musculoskeletal exam:  Gait/Station/Posture:   Normal stance, arm swing, and stride; no antalgia or Trendelenburg  Normal bulk and tone. Unremarkable spinal curvature. ASIS heights even.     Cervical spine:  Range of motion within normal limits   Myofascial tenderness: Significant tenderness in cervical paraspinals, trapezius, periscapular musuclature.   No focal spinous process tenderness.   Spurling's negative bilaterally.      Lumbar spine:  Range of motion within normal limits   Myofascial tenderness:  Lumbar paraspinal tenderness bilateral  Focal tenderness: gluteal tenderness bilaterally  SLR: negative b/l    Hip exam:   normal internal and external range of motion bilaterally. VICKY negative. Scour negative.     Shoulder exam:   No shoulder girdle wasting or scapular dyskinesis. No palmar muscle wasting. Shoulder range of motion decreased in flexion and abduction b/l.     Neurologic exam:  CN:  Cranial nerves 2-12 are grossly intact  Motor Strength:  5/5 symmetric UE and LE strength    Reflexes:     Biceps C5:     R:  2/4 L: 2/4   Brachioradialis  C6: R:  2/4 L: 2/4   Triceps C7:  R:  2/4 L: 2/4   Patella L4:  R:  2/4 L: 2/4   Achilles S1:  R:  2/4 L: 2/4     No ankle clonus b/l    Sensory:  (upper and lower extremities):   Light touch: normal    Allodynia: absent    Hyperalgesia: absent     Assessment:  Oly Rush is a 44 year old female with a past medical history significant for fibromyalgia, migraines, RA, hx of cervical cancer s/p hysterectomy and left salpingo-oophorectomy, anxiety who presents with complaints of  widespread pain.     1. Fibromyalgia: Diagnosed by Rheumatology in 2017. Oly is not interested in medication management. She prefers using more natural approach to manage her symptoms and is planning on scheduling an appointment with a clinic that offers a more holistic approach.     2. Mental Health - the patient's mental health concerns, specifically stress and anxiety, affect her experience of pain and contribute to her clinically significant distress.    Plan:  The following recommendations were given to the patient. Diagnosis, treatment options, risks, benefits, and alternatives were discussed, and all questions were answered. The patient expressed understanding of the plan for management.     I am recommending a multidisciplinary treatment plan to help this patient better manage her pain. She will consider pain PT and pain psychology This includes:     Therapy: Discussed pain PT. Physical Therapy at the [facility] focuses on 3 main things. Exercise--consistency vs intensity with a home exercise program consisting of stretching, strengthening exercises, and aerobic activity. Self-care is important--focusing on learning how to pace your activities, energy conservation, relaxation, stress management, mindfulness. The last one is body awareness--which includes looking at posture and body mechanics. Focuses on education and things that you can do to take care of yourself and assist you with managing your pain. Oly will consider this.  2     Clinical Health Pain Psychologist: Discussed pain psychology. Therapy can help reduce physical and psychosocial triggers or reinforcers of pain by adapting thoughts, feelings and behaviors to reduce symptoms and increase quality of life.  Evidence indicates that the practice of relaxation, meditation, and mindfulness techniques can significantly affect pain levels and overall well being. Oly will consider this.     3.    Diagnostic Studies: No  studies needed.     4.     Medication Management: Oly is not interested in medication management.     5.     Follow up: Oly will call if interested in participating in pain PT and/or pain psychology.      Review of Electronic Chart: Today I have also reviewed available medical information in the patient's medical record at [facility] ([facility]), including relevant provider notes, laboratory work, and imaging.     I spent 60 minutes of time face to face with the patient.  Greater than 50% of this time was spent in patient counseling and/or coordination of care regarding principles of multidisciplinary care, medication management, and treatment options as discussed above.     Hannah Mccarty MD  [facility]

## 2019-09-11 NOTE — PROGRESS NOTES
"[facility] Initial Evaluation -- Upper Extremity    Evaluation Date: September 11, 2019  Oly Rush is a 44 year old female with a L Biceps Tendonitis condition.   Referral: Dr. 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   Leisure mechanical stresses: basketball; tennis  Functional disability score (SPADI): see flowsheet  VAS score (0-10): 2/10  Handedness (R/L):  R  Patient goals/expectations:  \"Help eliminate pain and reduce N&T\"    HISTORY    Present symptoms: L shoulder and biceps.    Pain quality (sharp/shooting/stabbing/aching/burning/cramping):  Burning; aching    Present since (onset date):  May 2019    Symptoms (improving/unchanging/worsening):  unchanging.    Symptoms commenced as a result of: no apparent reason  Condition occurred in the following environment: n/a    Symptoms at onset: L shoulder  Paresthesia (yes/no):  N&T into L hand  Spinal history: neck stiffness, joss. On L   Cough/Sneeze (pos/neg):  neg    Constant symptoms: L shoulder (N&T)  Intermittent symptoms:     Symptoms are worse with the following: Always Reaching, Always Sleeping (prone/sup/side R/L) - sides and Other - elevating arm   Symptoms are better with the following: Other - non-use    Continued use makes the pain (better/worse/no effect): worse    Disturbed night (yes/no):  yes    Pain at rest (yes/no): no  Site (neck/shoulder/elbow/wrist/hand): shoulder    Other questions (swelling/catching/clicking/locking/subluxing):  none    Previous episodes: none  Previous treatments: no    Specific Questions:  General health (excellent/good/fair/poor):  good  Pertinent medical history includes: Cancer, Depression, Fibromyalgia, Migraines/Headaches and Overweight  Medications (nil/NSAIDS/analg/steroids/anticoag/other):  NSAIDS and Other - Medical marijuana  Medical allergies:  no  Imaging (None/Xray/MRI/Other): Xray  Recent or major " surgery (yes/no): no  Night pain (yes/no): no  Accidents (yes/no): no  Unexplained weight loss (yes/no): no  Barriers at home: no  Other red flags: no    Sites for physical examination (neck/shoulder/elbow/wrist/hand): neck and L shoulder    EXAMINATION    Posture:  Sitting (good/fair/poor): fair  Correction of posture (better/worse/no effect/NA): better  Standing (good/fair/poor): fair  Other observations:      Neurological (NA/motor/sensory/reflexes/dural): Pos L ULNTT    Baselines (pain or functional activity): reaching overhead    Extremities (Shoulder/Elbow/Wrist/Hand): R shoulder    Movement Loss Anastacio Mod Min Nil Pain   Flexion   x x pdm   Extension/IR  x x  pdm   Abduction   x  pdm   Internal Rotation        External Rotation        Supination        Pronation        Radial Deviation        Ulnar Deviation           Passive Movement (+/- overpressure)/(PDM/ERP):  Not tested  Resisted Test Response (pain): not tested  Other Tests:     Spine:  Movement Loss: Cx ROM: Ret=mod loss produce left scap pain; R rot=nil/min loss; L Rot=nil/min loss prod L scap pain w/reduced N&T; Ext=mod loss w/ prod scap pain; R SB= mod loss prod. scap pain reduced N&T  Effect of repeated movements: 1. Rep RET+op=P L scap, decr. L hand/NB hand, NW scap/NE ROM 2. Rep L SB=P L scap, decr. L hand/NW L scap, B L hand/Incr. L UE elevation, abduction, EXT/IR  Effect of static positioning:   Spine testing (not relevant/relevant/secondary problem): relevant; warrants cervical spine treatment    Baseline Symptoms: Cervical Spine  Repeated Tests Symptom Response Mechanical Response   Active/Passive movement, resisted test, functional test During - Produce, Abolish, Increase, Decrease, NE After -   Better, Worse, NB, NW, NE Effect -   ? or ? ROM, strength or key functional test No Effect                               Effect of static positioning                  Provisional Classification (Extremity/Spine): Spine - Derangement - Asymmetrical,  unilateral, symptoms below elbow      Principle of Management:  Education:  Centralization, therapeutic dose of exercise, traffic light, avoidance of sustained neck flex  Equipment provided:  Suggested lumbar roll  Exercise and dosage:  Rep L SB x10/every 2 hrs    ASSESSMENT/PLAN:    Patient is a 44 year old female with cervical and left side shoulder complaints.    Patient has the following significant findings with corresponding treatment plan.                Diagnosis 1:  Cervical Radiculopathy  Pain -  self management, education, directional preference exercise and home program  Decreased ROM/flexibility - manual therapy and therapeutic exercise  Decreased function - therapeutic activities  Impaired posture - neuro re-education  Diagnosis 2:  L Shoulder Pain   Pain -  manual therapy, self management, education, directional preference exercise and home program  Decreased ROM/flexibility - manual therapy and therapeutic exercise  Decreased function - therapeutic activities  Impaired posture - neuro re-education    Therapy Evaluation Codes:   1) History comprised of:   Personal factors that impact the plan of care:      Coping style.    Comorbidity factors that impact the plan of care are:      Cancer, Depression, Fibromyalgia, Migraines/headaches, Numbness/tingling and Overweight.     Medications impacting care: Anti-inflammatory and medical marijuana.  2) Examination of Body Systems comprised of:   Body structures and functions that impact the plan of care:      Cervical spine and Shoulder.   Activity limitations that impact the plan of care are:      Bathing and Lifting.  3) Clinical presentation characteristics are:   Evolving/Changing.  4) Decision-Making    Moderate complexity using standardized patient assessment instrument and/or measureable assessment of functional outcome.  Cumulative Therapy Evaluation is: Moderate complexity.    Previous and current functional limitations:  (See Goal Flow Sheet for this  information)    Short term and Long term goals: (See Goal Flow Sheet for this information)     Communication ability:  Patient appears to be able to clearly communicate and understand verbal and written communication and follow directions correctly.  Treatment Explanation - The following has been discussed with the patient:   RX ordered/plan of care  Anticipated outcomes  Possible risks and side effects  This patient would benefit from PT intervention to resume normal activities.   Rehab potential is excellent.    Frequency:  1 X week, once daily  Duration:  for 6 weeks  Discharge Plan:  Achieve all LTG.  Independent in home treatment program.  Reach maximal therapeutic benefit.    Please refer to the daily flowsheet for treatment today, total treatment time and time spent performing 1:1 timed codes.

## 2019-09-17 ENCOUNTER — THERAPY VISIT (OUTPATIENT)
Dept: PHYSICAL THERAPY | Facility: CLINIC | Age: 44
End: 2019-09-17
Payer: COMMERCIAL

## 2019-09-17 DIAGNOSIS — M54.12 CERVICAL RADICULITIS: Primary | ICD-10-CM

## 2019-09-17 PROCEDURE — 97110 THERAPEUTIC EXERCISES: CPT | Mod: GP | Performed by: PHYSICAL THERAPIST

## 2019-09-17 PROCEDURE — 97530 THERAPEUTIC ACTIVITIES: CPT | Mod: GP | Performed by: PHYSICAL THERAPIST

## 2019-10-09 ENCOUNTER — VIRTUAL VISIT (OUTPATIENT)
Dept: FAMILY MEDICINE | Facility: CLINIC | Age: 44
End: 2019-10-09
Payer: COMMERCIAL

## 2019-10-09 DIAGNOSIS — M79.7 FIBROMYALGIA: Primary | ICD-10-CM

## 2019-10-09 DIAGNOSIS — F41.9 ANXIETY: ICD-10-CM

## 2019-10-09 PROCEDURE — 99442 ZZC PHYSICIAN TELEPHONE EVALUATION 11-20 MIN: CPT | Performed by: FAMILY MEDICINE

## 2019-10-09 NOTE — PROGRESS NOTES
"Oly Rush is a 44 year old female who is being evaluated via a billable telephone visit.      The patient has been notified of following:     \"This telephone visit will be conducted via a call between you and your physician/provider. We have found that certain health care needs can be provided without the need for a physical exam.  This service lets us provide the care you need with a short phone conversation.  If a prescription is necessary we can send it directly to your pharmacy.  If lab work is needed we can place an order for that and you can then stop by our lab to have the test done at a later time.    If during the course of the call the physician/provider feels a telephone visit is not appropriate, you will not be charged for this service.\"     Consent has been obtained for this service by 1 care team member: yes. See the scanned image in the medical record.    Oly Rush complains of    Chief Complaint   Patient presents with     Pain     RECERTIFY FOR medical marijuana for the state.        I have reviewed and updated the patient's Past Medical History, Social History, Family History and Medication List.    ALLERGIES  Flagyl [metronidazole]    Lisa Magill, CMA   (MA signature)    Additional provider notes: chronic pain, using medical marijuana, is manageable, is more proactive now, alternative methods, wellness clinic, they do more blood work and looks at her blood chemistry, hormones and then give her supplements and diet recommendations. Insurance does not cover it. Tried going back to acupuncture. Fibromyalgia got worse. So tried message therapy and this did not help.   Pain times 10 when she does these treatments. Is using medical marijuana, it does help, is worried about vaping. Stopped the vaping. Now using the tinctures and this helps her a lot, is able to sleep, helps her pain. Episodes are not as bad. [place] and the silvana 50-50, so depending on the pain she will use the " 50-50, the tangerine is more for relaxation and for sleep. Was using it every day, not cutting back to every other. It is effecting her short term memory some, so using less.  She is also using straight CBD as well, but not lately.  Pain level is 8/10 lately.       Assessment/Plan:  Fibromyalgia  (primary encounter diagnosis)  Anxiety    I have reviewed the note as documented above.  This accurately captures the substance of my conversation with the patient.  [facility], MD          Total time of call between patient and provider was 12 minutes     Carrie Prasad MD

## 2019-10-19 ENCOUNTER — MYC MEDICAL ADVICE (OUTPATIENT)
Dept: FAMILY MEDICINE | Facility: CLINIC | Age: 44
End: 2019-10-19

## 2019-10-19 DIAGNOSIS — Z13.6 CARDIOVASCULAR SCREENING; LDL GOAL LESS THAN 160: Primary | ICD-10-CM

## 2019-10-19 DIAGNOSIS — F41.9 ANXIETY: ICD-10-CM

## 2019-10-30 DIAGNOSIS — M79.7 FIBROMYALGIA: ICD-10-CM

## 2019-10-30 DIAGNOSIS — F41.9 ANXIETY: ICD-10-CM

## 2019-10-30 DIAGNOSIS — Z13.6 CARDIOVASCULAR SCREENING; LDL GOAL LESS THAN 160: ICD-10-CM

## 2019-10-30 PROCEDURE — 86431 RHEUMATOID FACTOR QUANT: CPT | Performed by: FAMILY MEDICINE

## 2019-10-30 PROCEDURE — 36415 COLL VENOUS BLD VENIPUNCTURE: CPT | Performed by: FAMILY MEDICINE

## 2019-10-30 PROCEDURE — 80061 LIPID PANEL: CPT | Performed by: FAMILY MEDICINE

## 2019-10-30 PROCEDURE — 84443 ASSAY THYROID STIM HORMONE: CPT | Performed by: FAMILY MEDICINE

## 2019-10-31 LAB
CHOLEST SERPL-MCNC: 217 MG/DL
HDLC SERPL-MCNC: 49 MG/DL
LDLC SERPL CALC-MCNC: 145 MG/DL
NONHDLC SERPL-MCNC: 168 MG/DL
RHEUMATOID FACT SER NEPH-ACNC: 21 IU/ML (ref 0–20)
TRIGL SERPL-MCNC: 114 MG/DL
TSH SERPL DL<=0.005 MIU/L-ACNC: 1.16 MU/L (ref 0.4–4)

## 2019-11-07 ENCOUNTER — HEALTH MAINTENANCE LETTER (OUTPATIENT)
Age: 44
End: 2019-11-07

## 2019-12-04 ENCOUNTER — OFFICE VISIT (OUTPATIENT)
Dept: FAMILY MEDICINE | Facility: CLINIC | Age: 44
End: 2019-12-04
Payer: COMMERCIAL

## 2019-12-04 VITALS
RESPIRATION RATE: 16 BRPM | OXYGEN SATURATION: 98 % | HEIGHT: 65 IN | HEART RATE: 73 BPM | WEIGHT: 209 LBS | SYSTOLIC BLOOD PRESSURE: 106 MMHG | BODY MASS INDEX: 34.82 KG/M2 | DIASTOLIC BLOOD PRESSURE: 78 MMHG | TEMPERATURE: 98.1 F

## 2019-12-04 DIAGNOSIS — F41.9 ANXIETY: ICD-10-CM

## 2019-12-04 DIAGNOSIS — M25.50 MULTIPLE JOINT PAIN: ICD-10-CM

## 2019-12-04 DIAGNOSIS — Z00.00 ROUTINE GENERAL MEDICAL EXAMINATION AT A HEALTH CARE FACILITY: Primary | ICD-10-CM

## 2019-12-04 DIAGNOSIS — E66.3 OVERWEIGHT: ICD-10-CM

## 2019-12-04 DIAGNOSIS — M05.79 RHEUMATOID ARTHRITIS INVOLVING MULTIPLE SITES WITH POSITIVE RHEUMATOID FACTOR (H): ICD-10-CM

## 2019-12-04 DIAGNOSIS — R23.2 HOT FLASHES: ICD-10-CM

## 2019-12-04 LAB
ERYTHROCYTE [DISTWIDTH] IN BLOOD BY AUTOMATED COUNT: 12.7 % (ref 10–15)
ERYTHROCYTE [SEDIMENTATION RATE] IN BLOOD BY WESTERGREN METHOD: 10 MM/H (ref 0–20)
ESTRADIOL SERPL-MCNC: 196 PG/ML
FSH SERPL-ACNC: 10 IU/L
HCT VFR BLD AUTO: 41.1 % (ref 35–47)
HGB BLD-MCNC: 13.5 G/DL (ref 11.7–15.7)
MCH RBC QN AUTO: 29.5 PG (ref 26.5–33)
MCHC RBC AUTO-ENTMCNC: 32.8 G/DL (ref 31.5–36.5)
MCV RBC AUTO: 90 FL (ref 78–100)
PLATELET # BLD AUTO: 326 10E9/L (ref 150–450)
RBC # BLD AUTO: 4.58 10E12/L (ref 3.8–5.2)
WBC # BLD AUTO: 11.2 10E9/L (ref 4–11)

## 2019-12-04 PROCEDURE — 90715 TDAP VACCINE 7 YRS/> IM: CPT | Performed by: FAMILY MEDICINE

## 2019-12-04 PROCEDURE — G0145 SCR C/V CYTO,THINLAYER,RESCR: HCPCS | Performed by: FAMILY MEDICINE

## 2019-12-04 PROCEDURE — 80053 COMPREHEN METABOLIC PANEL: CPT | Performed by: FAMILY MEDICINE

## 2019-12-04 PROCEDURE — 85027 COMPLETE CBC AUTOMATED: CPT | Performed by: FAMILY MEDICINE

## 2019-12-04 PROCEDURE — 83001 ASSAY OF GONADOTROPIN (FSH): CPT | Performed by: FAMILY MEDICINE

## 2019-12-04 PROCEDURE — 99396 PREV VISIT EST AGE 40-64: CPT | Mod: 25 | Performed by: FAMILY MEDICINE

## 2019-12-04 PROCEDURE — 87624 HPV HI-RISK TYP POOLED RSLT: CPT | Performed by: FAMILY MEDICINE

## 2019-12-04 PROCEDURE — 90471 IMMUNIZATION ADMIN: CPT | Performed by: FAMILY MEDICINE

## 2019-12-04 PROCEDURE — 36415 COLL VENOUS BLD VENIPUNCTURE: CPT | Performed by: FAMILY MEDICINE

## 2019-12-04 PROCEDURE — 85652 RBC SED RATE AUTOMATED: CPT | Performed by: FAMILY MEDICINE

## 2019-12-04 PROCEDURE — 82670 ASSAY OF TOTAL ESTRADIOL: CPT | Performed by: FAMILY MEDICINE

## 2019-12-04 ASSESSMENT — ENCOUNTER SYMPTOMS
FEVER: 0
EYE PAIN: 0
CHILLS: 0
DIZZINESS: 1
COUGH: 0
ABDOMINAL PAIN: 0
FREQUENCY: 0
DIARRHEA: 0
NERVOUS/ANXIOUS: 1
CONSTIPATION: 0
HEMATURIA: 0
HEMATOCHEZIA: 0

## 2019-12-04 ASSESSMENT — MIFFLIN-ST. JEOR: SCORE: 1598.9

## 2019-12-04 NOTE — PROGRESS NOTES
"   SUBJECTIVE:   CC: Oly Rush is an 44 year old woman who presents for preventive health visit.     Healthy Habits:    Do you get at least three servings of calcium containing foods daily (dairy, green leafy vegetables, etc.)? { :757616::\"yes\"}    Amount of exercise or daily activities, outside of work: { :731381}    Problems taking medications regularly { :412035::\"No\"}    Medication side effects: { :384572::\"No\"}    Have you had an eye exam in the past two years? { :175660}    Do you see a dentist twice per year? { :450293}    Do you have sleep apnea, excessive snoring or daytime drowsiness?{ :716182}  {Outside tests to abstract? :731684}    {additional problems to add (Optional):235971}    Today's PHQ-2 Score:   PHQ-2 ( 1999 Pfizer) 12/4/2019 12/13/2018   Q1: Little interest or pleasure in doing things - 0   Q2: Feeling down, depressed or hopeless - 0   PHQ-2 Score - 0   Q1: Little interest or pleasure in doing things Several days -   Q2: Feeling down, depressed or hopeless Several days -   PHQ-2 Score 2 -     {PHQ-2 LOOK IN ASSESSMENTS (Optional) :834526}  Abuse: Current or Past(Physical, Sexual or Emotional)- {YES/NO/NA:635757}  Do you feel safe in your environment? {YES/NO/NA:705815}        Social History     Tobacco Use     Smoking status: Never Smoker     Smokeless tobacco: Never Used   Substance Use Topics     Alcohol use: Yes     Alcohol/week: 0.0 standard drinks     Comment: socially      If you drink alcohol do you typically have >3 drinks per day or >7 drinks per week? {ETOH :589837}                     Reviewed orders with patient.  Reviewed health maintenance and updated orders accordingly - {Yes/No:318859::\"Yes\"}  {Chronicprobdata (Optional):007836}    {Mammo Decision Support (Optional):068764}    Pertinent mammograms are reviewed under the imaging tab.  History of abnormal Pap smear: {PAP HX:095314}  PAP / HPV Latest Ref Rng & Units 5/1/2018 3/15/2016 7/14/2014   PAP - NIL NIL NIL   HPV 16 " "DNA NEG:Negative Negative Negative -   HPV 18 DNA NEG:Negative Negative Negative -   OTHER HR HPV NEG:Negative Negative Negative -     Reviewed and updated as needed this visit by clinical staff         Reviewed and updated as needed this visit by Provider        {HISTORY OPTIONS (Optional):665396}    ROS:  { :534054}    OBJECTIVE:   LMP 09/26/2012   EXAM:  {Exam Choices:164545}    {Diagnostic Test Results (Optional):366837::\"Diagnostic Test Results:\",\"Labs reviewed in Epic\"}    ASSESSMENT/PLAN:   {Diag Picklist:850008}    COUNSELING:   {FEMALE COUNSELING MESSAGES:096385::\"Reviewed preventive health counseling, as reflected in patient instructions\"}    Estimated body mass index is 34.55 kg/m  as calculated from the following:    Height as of 8/26/19: 1.651 m (5' 5\").    Weight as of 8/26/19: 94.2 kg (207 lb 9.6 oz).    {Weight Management Plan (ACO) Complete if BMI is abnormal-  Ages 18-64  BMI >24.9.  Age 65+ with BMI <23 or >30 (Optional):137258}     reports that she has never smoked. She has never used smokeless tobacco.  {Tobacco Cessation -- Complete if patient is a smoker (Optional):169516}    Counseling Resources:  ATP IV Guidelines  Pooled Cohorts Equation Calculator  Breast Cancer Risk Calculator  FRAX Risk Assessment  ICSI Preventive Guidelines  Dietary Guidelines for Americans, 2010  [employer]'s MyPlate  ASA Prophylaxis  Lung CA Screening    Carrie Prasad MD  [facility]  "

## 2019-12-04 NOTE — NURSING NOTE
Prior to immunization administration, verified patients identity using patient s name and date of birth. Please see Immunization Activity for additional information.     Screening Questionnaire for Adult Immunization    Are you sick today?   No   Do you have allergies to medications, food, a vaccine component or latex?   No   Have you ever had a serious reaction after receiving a vaccination?   No   Do you have a long-term health problem with heart disease, lung disease, asthma, kidney disease, metabolic disease (e.g. diabetes), anemia, or other blood disorder?   No   Do you have cancer, leukemia, HIV/AIDS, or any other immune system problem?   No   In the past 3 months, have you taken medications that affect  your immune system, such as prednisone, other steroids, or anticancer drugs; drugs for the treatment of rheumatoid arthritis, Crohn s disease, or psoriasis; or have you had radiation treatments?   No   Have you had a seizure, or a brain or other nervous system problem?   No   During the past year, have you received a transfusion of blood or blood     products, or been given immune (gamma) globulin or antiviral drug?   No   For women: Are you pregnant or is there a chance you could become        pregnant during the next month?   No   Have you received any vaccinations in the past 4 weeks?   No     Immunization questionnaire answers were all negative.        Per orders of Dr. Carrie Dick, injection of Adacel given by Orly Duval CMA. Patient instructed to remain in clinic for 15 minutes afterwards, and to report any adverse reaction to me immediately.       Screening performed by Orly Duval CMA on 12/4/2019 at 9:23 AM.

## 2019-12-04 NOTE — PROGRESS NOTES
SUBJECTIVE:   CC: Oly Rush is an 44 year old woman who presents for preventive health visit.     Healthy Habits:     Getting at least 3 servings of Calcium per day:  NO    Bi-annual eye exam:  NO    Dental care twice a year:  Yes    Sleep apnea or symptoms of sleep apnea:  None    Diet:  Regular (no restrictions)    Frequency of exercise:  2-3 days/week    Duration of exercise:  30-45 minutes    Taking medications regularly:  Yes    Medication side effects:  Muscle aches    PHQ-2 Total Score: 2    Additional concerns today:  Yes          PROBLEMS TO ADD ON...    Today's PHQ-2 Score:   PHQ-2 ( 1999 Pfizer) 12/4/2019   Q1: Little interest or pleasure in doing things 1   Q2: Feeling down, depressed or hopeless 1   PHQ-2 Score 2   Q1: Little interest or pleasure in doing things Several days   Q2: Feeling down, depressed or hopeless Several days   PHQ-2 Score 2       Abuse: Current or Past(Physical, Sexual or Emotional)- No  Do you feel safe in your environment? Yes        Social History     Tobacco Use     Smoking status: Never Smoker     Smokeless tobacco: Never Used   Substance Use Topics     Alcohol use: Yes     Alcohol/week: 0.0 standard drinks     Comment: socially      If you drink alcohol do you typically have >3 drinks per day or >7 drinks per week? No    Alcohol Use 12/4/2019   Prescreen: >3 drinks/day or >7 drinks/week? No   Prescreen: >3 drinks/day or >7 drinks/week? -       Doing the medical marijuana, tincture at bedtime, will occasionally use the vape when she needs it to kick in quickly, no longer using it routinely due to lungs were not doing well. [facility].  No longer needing her inhaler, unless she gets a cold.    Labs were done already, wants to discuss it.    PT did help her wrist, elbow , forarm, shoulder, going to Pilates is helping a lot , stretching a lot, stopped her PT and going to chiro now.  Trying to eat healthy. Avocado toast      Reviewed orders with patient.  Reviewed  "health maintenance and updated orders accordingly - Yes  Labs reviewed in EPIC  BP Readings from Last 3 Encounters:   12/04/19 106/78   09/11/19 109/71   08/26/19 122/88    Wt Readings from Last 3 Encounters:   12/04/19 94.8 kg (209 lb)   08/26/19 94.2 kg (207 lb 9.6 oz)   03/04/19 92 kg (202 lb 14.4 oz)                    Mammogram Screening: Patient under age 50, mutual decision reflected in health maintenance.      Pertinent mammograms are reviewed under the imaging tab.  History of abnormal Pap smear: NO - age 30- 65 PAP every 3 years recommended  PAP / HPV Latest Ref Rng & Units 5/1/2018 3/15/2016 7/14/2014   PAP - NIL NIL NIL   HPV 16 DNA NEG:Negative Negative Negative -   HPV 18 DNA NEG:Negative Negative Negative -   OTHER HR HPV NEG:Negative Negative Negative -     Reviewed and updated as needed this visit by clinical staff  Tobacco  Allergies  Meds  Med Hx  Surg Hx  Fam Hx  Soc Hx        Reviewed and updated as needed this visit by Provider            Review of Systems   Constitutional: Negative for chills and fever.   HENT: Negative for congestion and ear pain.    Eyes: Negative for pain.   Respiratory: Negative for cough.    Cardiovascular: Negative for chest pain.   Gastrointestinal: Negative for abdominal pain, constipation, diarrhea and hematochezia.   Genitourinary: Negative for frequency and hematuria.   Neurological: Positive for dizziness.   Psychiatric/Behavioral: The patient is nervous/anxious.           OBJECTIVE:   /78 (BP Location: Right arm, Patient Position: Sitting, Cuff Size: Adult Large)   Pulse 73   Temp 98.1  F (36.7  C) (Oral)   Resp 16   Ht 1.651 m (5' 5\")   Wt 94.8 kg (209 lb)   LMP 09/26/2012   SpO2 98%   BMI 34.78 kg/m    Physical Exam  GENERAL: healthy, alert and no distress  EYES: Eyes grossly normal to inspection, PERRL and conjunctivae and sclerae normal  HENT: ear canals and TM's normal, nose and mouth without ulcers or lesions  NECK: no adenopathy, no " asymmetry, masses, or scars and thyroid normal to palpation  RESP: lungs clear to auscultation - no rales, rhonchi or wheezes  BREAST: normal without masses, tenderness or nipple discharge and no palpable axillary masses or adenopathy  CV: regular rate and rhythm, normal S1 S2, no S3 or S4, no murmur, click or rub, no peripheral edema and peripheral pulses strong  ABDOMEN: soft, nontender, no hepatosplenomegaly, no masses and bowel sounds normal  MS: no gross musculoskeletal defects noted, no edema  Some fibromyalgia trigger points positive for pain on exam  SKIN: no suspicious lesions or rashes  NEURO: Normal strength and tone, mentation intact and speech normal  PSYCH: mentation appears normal, affect normal/bright    Diagnostic Test Results:  Labs reviewed in Epic    ASSESSMENT/PLAN:   1. Routine general medical examination at a health care facility  Normal exam, other than on going body pains, fibro pain  - Pap imaged thin layer screen with HPV - recommended age 30 - 65  -      ADMIN VACCINE, FIRST  - CBC with platelets  - Comprehensive metabolic panel  - HPV High Risk Types DNA Cervical    2. Rheumatoid arthritis involving multiple sites with positive rheumatoid factor (H)  Rechecking, seen by rheum in past, currently not being treated for RA, but labs have been elevated  - Erythrocyte sedimentation rate auto    3. Anxiety  Fair control    4. Overweight  Pt has on going concerns with her weight, will refer  - NUTRITION REFERRAL    5. Hot flashes  On going, will check labs, no menstration, s/p hyst  - Estradiol  - Follicle stimulating hormone  - Erythrocyte sedimentation rate auto    6. Multiple joint pain  Fibromyalgia pain, medical marijuana is helpful, but pain is on going      COUNSELING:  Reviewed preventive health counseling, as reflected in patient instructions       Regular exercise       Healthy diet/nutrition    Estimated body mass index is 34.78 kg/m  as calculated from the following:    Height as of  "this encounter: 1.651 m (5' 5\").    Weight as of this encounter: 94.8 kg (209 lb).    Weight management plan: Discussed healthy diet and exercise guidelines     reports that she has never smoked. She has never used smokeless tobacco.      Counseling Resources:  ATP IV Guidelines  Pooled Cohorts Equation Calculator  Breast Cancer Risk Calculator  FRAX Risk Assessment  ICSI Preventive Guidelines  Dietary Guidelines for Americans, 2010  USDA's MyPlate  ASA Prophylaxis  Lung CA Screening    Carrie Prasad MD  [facility]  "

## 2019-12-05 LAB
ALBUMIN SERPL-MCNC: 3.7 G/DL (ref 3.4–5)
ALP SERPL-CCNC: 103 U/L (ref 40–150)
ALT SERPL W P-5'-P-CCNC: 40 U/L (ref 0–50)
ANION GAP SERPL CALCULATED.3IONS-SCNC: 8 MMOL/L (ref 3–14)
AST SERPL W P-5'-P-CCNC: 24 U/L (ref 0–45)
BILIRUB SERPL-MCNC: 0.4 MG/DL (ref 0.2–1.3)
BUN SERPL-MCNC: 11 MG/DL (ref 7–30)
CALCIUM SERPL-MCNC: 8.7 MG/DL (ref 8.5–10.1)
CHLORIDE SERPL-SCNC: 105 MMOL/L (ref 94–109)
CO2 SERPL-SCNC: 24 MMOL/L (ref 20–32)
CREAT SERPL-MCNC: 0.76 MG/DL (ref 0.52–1.04)
GFR SERPL CREATININE-BSD FRML MDRD: >90 ML/MIN/{1.73_M2}
GLUCOSE SERPL-MCNC: 79 MG/DL (ref 70–99)
POTASSIUM SERPL-SCNC: 3.7 MMOL/L (ref 3.4–5.3)
PROT SERPL-MCNC: 7 G/DL (ref 6.8–8.8)
SODIUM SERPL-SCNC: 137 MMOL/L (ref 133–144)

## 2019-12-06 LAB
COPATH REPORT: NORMAL
PAP: NORMAL

## 2019-12-12 NOTE — PROGRESS NOTES
Subjective:  HPI                    Objective:  System    Physical Exam    General     ROS    Assessment/Plan:    DISCHARGE REPORT    Progress reporting period is from 9-11-19 to 9-17-19.       SUBJECTIVE  Subjective changes noted by patient:  .  Subjective: Pt reports she is better since last visit. Is compliant with HEP. N & T into L hand is now intermittent.     Current pain level is 1/10  .     Previous pain level was  2/10 Initial Pain level: 2/10.   Changes in function:  Yes (See Goal flowsheet attached for changes in current functional level)  Adverse reaction to treatment or activity: None    OBJECTIVE  Changes noted in objective findings:  Patient has failed to return to therapy so current objective findings are unknown.  Objective: R Shoulder ROM: Ext/IR=min loss; Cx ROM= RET;min loss; R ROT= min loss; L Rot=min loss; R SB=min loss; L SB=min loss     ASSESSMENT/PLAN  Updated problem list and treatment plan: Diagnosis 1:  Cervical radiculopathy  Pain -  manual therapy, self management, education, directional preference exercise and home program  Decreased ROM/flexibility - manual therapy and therapeutic exercise  Decreased strength - therapeutic exercise and therapeutic activities  Decreased function - therapeutic activities  Impaired posture - neuro re-education  STG/LTGs have been met or progress has been made towards goals:  Yes (See Goal flow sheet completed today.)  Assessment of Progress: The patient has not returned to therapy. Current status is unknown.  Self Management Plans:  Patient is independent in a home treatment program.  Patient is independent in self management of symptoms.  I have re-evaluated this patient and find that the nature, scope, duration and intensity of the therapy is appropriate for the medical condition of the patient.  Oly continues to require the following intervention to meet STG and LTG's:  PT intervention is no longer required to meet  STG/LTG.    Recommendations:  This patient is ready to be discharged from therapy and continue their home treatment program.    Please refer to the daily flowsheet for treatment today, total treatment time and time spent performing 1:1 timed codes.

## 2020-03-04 ENCOUNTER — APPOINTMENT (OUTPATIENT)
Dept: ULTRASOUND IMAGING | Facility: CLINIC | Age: 45
End: 2020-03-04
Attending: EMERGENCY MEDICINE
Payer: COMMERCIAL

## 2020-03-04 ENCOUNTER — HOSPITAL ENCOUNTER (EMERGENCY)
Facility: CLINIC | Age: 45
Discharge: HOME | End: 2020-03-04
Attending: EMERGENCY MEDICINE | Admitting: EMERGENCY MEDICINE
Payer: COMMERCIAL

## 2020-03-04 VITALS
SYSTOLIC BLOOD PRESSURE: 125 MMHG | RESPIRATION RATE: 16 BRPM | HEART RATE: 66 BPM | OXYGEN SATURATION: 98 % | WEIGHT: 200 LBS | BODY MASS INDEX: 33.28 KG/M2 | DIASTOLIC BLOOD PRESSURE: 85 MMHG | TEMPERATURE: 97.7 F

## 2020-03-04 DIAGNOSIS — R10.11 RUQ ABDOMINAL PAIN: ICD-10-CM

## 2020-03-04 DIAGNOSIS — K80.50 BILIARY COLIC: ICD-10-CM

## 2020-03-04 LAB
ALBUMIN SERPL-MCNC: 3.7 G/DL (ref 3.4–5)
ALBUMIN UR-MCNC: NEGATIVE MG/DL
ALP SERPL-CCNC: 109 U/L (ref 40–150)
ALT SERPL W P-5'-P-CCNC: 67 U/L (ref 0–50)
ANION GAP SERPL CALCULATED.3IONS-SCNC: 4 MMOL/L (ref 3–14)
APPEARANCE UR: CLEAR
AST SERPL W P-5'-P-CCNC: 42 U/L (ref 0–45)
BACTERIA #/AREA URNS HPF: ABNORMAL /HPF
BASOPHILS # BLD AUTO: 0.1 10E9/L (ref 0–0.2)
BASOPHILS NFR BLD AUTO: 0.7 %
BILIRUB SERPL-MCNC: 0.6 MG/DL (ref 0.2–1.3)
BILIRUB UR QL STRIP: NEGATIVE
BUN SERPL-MCNC: 11 MG/DL (ref 7–30)
CALCIUM SERPL-MCNC: 9.1 MG/DL (ref 8.5–10.1)
CHLORIDE SERPL-SCNC: 107 MMOL/L (ref 94–109)
CO2 SERPL-SCNC: 27 MMOL/L (ref 20–32)
COLOR UR AUTO: ABNORMAL
CREAT SERPL-MCNC: 0.77 MG/DL (ref 0.52–1.04)
DIFFERENTIAL METHOD BLD: NORMAL
EOSINOPHIL # BLD AUTO: 0.1 10E9/L (ref 0–0.7)
EOSINOPHIL NFR BLD AUTO: 1.3 %
ERYTHROCYTE [DISTWIDTH] IN BLOOD BY AUTOMATED COUNT: 12.4 % (ref 10–15)
GFR SERPL CREATININE-BSD FRML MDRD: >90 ML/MIN/{1.73_M2}
GLUCOSE SERPL-MCNC: 103 MG/DL (ref 70–99)
GLUCOSE UR STRIP-MCNC: NEGATIVE MG/DL
HCT VFR BLD AUTO: 44 % (ref 35–47)
HGB BLD-MCNC: 14.5 G/DL (ref 11.7–15.7)
HGB UR QL STRIP: NEGATIVE
IMM GRANULOCYTES # BLD: 0 10E9/L (ref 0–0.4)
IMM GRANULOCYTES NFR BLD: 0.4 %
KETONES UR STRIP-MCNC: NEGATIVE MG/DL
LACTATE BLD-SCNC: 1.3 MMOL/L (ref 0.7–2)
LEUKOCYTE ESTERASE UR QL STRIP: NEGATIVE
LIPASE SERPL-CCNC: 114 U/L (ref 73–393)
LYMPHOCYTES # BLD AUTO: 2.3 10E9/L (ref 0.8–5.3)
LYMPHOCYTES NFR BLD AUTO: 22.6 %
MCH RBC QN AUTO: 30 PG (ref 26.5–33)
MCHC RBC AUTO-ENTMCNC: 33 G/DL (ref 31.5–36.5)
MCV RBC AUTO: 91 FL (ref 78–100)
MONOCYTES # BLD AUTO: 0.7 10E9/L (ref 0–1.3)
MONOCYTES NFR BLD AUTO: 7.3 %
MUCOUS THREADS #/AREA URNS LPF: PRESENT /LPF
NEUTROPHILS # BLD AUTO: 6.9 10E9/L (ref 1.6–8.3)
NEUTROPHILS NFR BLD AUTO: 67.7 %
NITRATE UR QL: NEGATIVE
NRBC # BLD AUTO: 0 10*3/UL
NRBC BLD AUTO-RTO: 0 /100
PH UR STRIP: 8 PH (ref 5–7)
PLATELET # BLD AUTO: 362 10E9/L (ref 150–450)
POTASSIUM SERPL-SCNC: 3.5 MMOL/L (ref 3.4–5.3)
PROT SERPL-MCNC: 7.6 G/DL (ref 6.8–8.8)
RBC # BLD AUTO: 4.83 10E12/L (ref 3.8–5.2)
RBC #/AREA URNS AUTO: 1 /HPF (ref 0–2)
SODIUM SERPL-SCNC: 138 MMOL/L (ref 133–144)
SOURCE: ABNORMAL
SP GR UR STRIP: 1.01 (ref 1–1.03)
SQUAMOUS #/AREA URNS AUTO: 2 /HPF (ref 0–1)
TROPONIN I SERPL-MCNC: <0.015 UG/L (ref 0–0.04)
UROBILINOGEN UR STRIP-MCNC: NORMAL MG/DL (ref 0–2)
WBC # BLD AUTO: 10.1 10E9/L (ref 4–11)
WBC #/AREA URNS AUTO: <1 /HPF (ref 0–5)

## 2020-03-04 PROCEDURE — 83605 ASSAY OF LACTIC ACID: CPT | Performed by: EMERGENCY MEDICINE

## 2020-03-04 PROCEDURE — 80053 COMPREHEN METABOLIC PANEL: CPT | Performed by: EMERGENCY MEDICINE

## 2020-03-04 PROCEDURE — 99285 EMERGENCY DEPT VISIT HI MDM: CPT | Mod: 25

## 2020-03-04 PROCEDURE — 84484 ASSAY OF TROPONIN QUANT: CPT | Performed by: EMERGENCY MEDICINE

## 2020-03-04 PROCEDURE — 93005 ELECTROCARDIOGRAM TRACING: CPT

## 2020-03-04 PROCEDURE — 25000128 H RX IP 250 OP 636: Performed by: EMERGENCY MEDICINE

## 2020-03-04 PROCEDURE — 96374 THER/PROPH/DIAG INJ IV PUSH: CPT

## 2020-03-04 PROCEDURE — 96375 TX/PRO/DX INJ NEW DRUG ADDON: CPT

## 2020-03-04 PROCEDURE — 25800030 ZZH RX IP 258 OP 636: Performed by: EMERGENCY MEDICINE

## 2020-03-04 PROCEDURE — 76705 ECHO EXAM OF ABDOMEN: CPT

## 2020-03-04 PROCEDURE — 81001 URINALYSIS AUTO W/SCOPE: CPT | Performed by: EMERGENCY MEDICINE

## 2020-03-04 PROCEDURE — 85025 COMPLETE CBC W/AUTO DIFF WBC: CPT | Performed by: EMERGENCY MEDICINE

## 2020-03-04 PROCEDURE — 83690 ASSAY OF LIPASE: CPT | Performed by: EMERGENCY MEDICINE

## 2020-03-04 PROCEDURE — 96361 HYDRATE IV INFUSION ADD-ON: CPT

## 2020-03-04 RX ORDER — KETOROLAC TROMETHAMINE 15 MG/ML
15 INJECTION, SOLUTION INTRAMUSCULAR; INTRAVENOUS ONCE
Status: COMPLETED | OUTPATIENT
Start: 2020-03-04 | End: 2020-03-04

## 2020-03-04 RX ORDER — SODIUM CHLORIDE 9 MG/ML
1000 INJECTION, SOLUTION INTRAVENOUS CONTINUOUS
Status: DISCONTINUED | OUTPATIENT
Start: 2020-03-04 | End: 2020-03-04 | Stop reason: HOSPADM

## 2020-03-04 RX ORDER — ONDANSETRON 2 MG/ML
4 INJECTION INTRAMUSCULAR; INTRAVENOUS
Status: COMPLETED | OUTPATIENT
Start: 2020-03-04 | End: 2020-03-04

## 2020-03-04 RX ADMIN — ONDANSETRON 4 MG: 2 INJECTION INTRAMUSCULAR; INTRAVENOUS at 17:27

## 2020-03-04 RX ADMIN — KETOROLAC TROMETHAMINE 15 MG: 15 INJECTION, SOLUTION INTRAMUSCULAR; INTRAVENOUS at 15:30

## 2020-03-04 RX ADMIN — SODIUM CHLORIDE 1000 ML: 9 INJECTION, SOLUTION INTRAVENOUS at 15:30

## 2020-03-04 ASSESSMENT — ENCOUNTER SYMPTOMS
VOMITING: 0
DYSURIA: 0
ABDOMINAL DISTENTION: 1
DIZZINESS: 1
NAUSEA: 1
ABDOMINAL PAIN: 1
DIARRHEA: 1
FREQUENCY: 0
HEMATURIA: 0
BACK PAIN: 1

## 2020-03-04 NOTE — ED AVS SNAPSHOT
[facility] Emergency Department  [address]  [facility] [address]  Phone:  [phone]  Fax:  [phone]                                    Oly Rush   MRN: [id number]    Department:  [facility] Emergency Department   Date of Visit:  3/4/2020           After Visit Summary Signature Page    I have received my discharge instructions, and my questions have been answered. I have discussed any challenges I see with this plan with the nurse or doctor.    ..........................................................................................................................................  Patient/Patient Representative Signature      ..........................................................................................................................................  Patient Representative Print Name and Relationship to Patient    ..................................................               ................................................  Date                                   Time    ..........................................................................................................................................  Reviewed by Signature/Title    ...................................................              ..............................................  Date                                               Time          22EPIC Rev 08/18

## 2020-03-04 NOTE — ED NOTES
Toradol, zofran and 1l NS IV administered vie LUE IV prior to transfer to room 11 in ED. No computer was available to scan medication

## 2020-03-04 NOTE — ED PROVIDER NOTES
"  History     Chief Complaint:  Abdominal Pain and Flank Pain    HPI   Oly Rush is a 44 year old female with a history of cancer, fibromyalgia, among others who presents with wife for evaluation of acute onset 8/10 in severity right-sided back pain, radiating to her right upper quadrant, that began around 0730 this morning. The patient's wife states for the past few weeks she has had a decreased appetite, which has been progressively getting worse over the past year. Yesterday evening prior to going to bed she felt baseline, but when she woke up this morning she had bilateral ear pain, rhinorrhea, and dizziness. She decided to take a shower and wait for her symptoms to alleviate prior to going to work. While blow drying her hair after the shower around 0730 she had acute onset right-sided back pain, described as a \"weird back cramp\", which radiated to her right upper quadrant of her abdomen. The pain has been constant since initial onset but waxes and wanes with intensity and is exacerbated by ambulation. Her wife made her some food after work, which caused her to be nauseated and she attempted to rub her back to alleviate her pain, which did not help.     Here, the patient states she had a couple of episodes of diarrhea and has had two worse episodes of fibromyalgia that last couple of days. She notes she has had similar symptoms in the past, but she has not had any work-up to evaluate the cause of the pain. She denies any abdominal pain after eating, emesis, urinary symptoms, or other symptoms prompting her presentation.     Allergies:  Flagyl      Medications:    Albuterol  Flexeril  Mobic     Past Medical History:    Abnormal pap smear  Anxiety  Arthritis  Cancer  Migraines  Fibromyalgia   Varicella    Past Surgical History:     section x 2   Hysterectomy  Cone biopsy of cervix     Family History:    The patient denies any relevant family medical history.     Social History:  The patient was " accompanied to the ED by wife.  Smoking Status: Never  Smokeless Tobacco: Never  Alcohol Use: Yes  Drug Use: Yes, Marijuana (medical)   Marital Status:   [2]     Review of Systems   HENT: Positive for ear pain.    Gastrointestinal: Positive for abdominal distention, abdominal pain, diarrhea and nausea. Negative for vomiting.   Genitourinary: Negative for dysuria, frequency, hematuria and urgency.   Musculoskeletal: Positive for back pain.   Neurological: Positive for dizziness.   All other systems reviewed and are negative.      Physical Exam   BP (!) 122/91   Temp 97.7  F (36.5  C) (Oral)   Resp 20   Wt 90.7 kg (200 lb)   LMP 09/26/2012   SpO2 99%   BMI 33.28 kg/m     Physical Exam  General: The patient is alert, in no respiratory distress.    HENT: Mucous membranes moist.    Cardiovascular: Regular rate and rhythm. Good pulses in all four extremities. Normal capillary refill and skin turgor.     Respiratory: Lungs are clear. No nasal flaring. No retractions. No wheezing, no crackles.    Gastrointestinal: Abdomen soft. No guarding, no rebound. No palpable hernias. Abdominal tenderness on the right, worst in the right upper quadrant. No flank tenderness.     Musculoskeletal: No gross deformity.     Skin: No rashes or petechiae.     Neurologic: The patient is alert and oriented x3. GCS 15. No testable cranial nerve deficit. Follows commands with clear and appropriate speech. Gives appropriate answers. Good strength in all extremities. No gross neurologic deficit. Gross sensation intact. Pupils are round and reactive. No meningismus.     Lymphatic: No cervical adenopathy. No lower extremity swelling.    Psychiatric: The patient is non-tearful.   Emergency Department Course     ECG:  ECG taken at 1524, ECG read at 1527 by Pankaj Saldana MD  Normal sinus rhythm with sinus arrhythmia   Normal ECG  Rate 7 bpm. MD interval 198. QRS duration 82. QT/QTc 400/422. P-R-T axes 44 -4 9.       Imaging:  Radiology findings were communicated with the patient who voiced understanding of the findings.    US Abdomen Limited  IMPRESSION:    1. No acute abnormality. No gallstones.  2. Fatty liver.  Reading per radiology.      Laboratory:  Laboratory findings were communicated with the patient who voiced understanding of the findings.    CBC: AWNL (WBC 10.1, HGB 14.5, )   CMP: Glucose 103 (H), Alt 67 (H) o/w WNL (Creatinine 0.77)   Troponin (Collected 1440): <0.015   Lactic Acid (Collected at 1440): 1.3   Lipase: 114     UA with Microscopic: pH urine 8.0 (H), Bacteria Few (A), Squamous epithelial/HPF urine 2 (H), Mucous urine Present (A) o/w WNL     Emergency Department Course:  Past medical records, nursing notes, and vitals reviewed.  EKG obtained in the ED, see results above.    The patient was sent for a US Abdomen Limited while in the emergency department, results above.    IV was inserted and blood was drawn for laboratory testing, results above.   The patient provided a urine sample here in the emergency department. This was sent for laboratory testing, findings above.     (1436)   I performed an exam of the patient as documented above. History obtained from patient and wife.     (1502)   I discussed using pain medication to alleviate her pain. I also discussed current plan of care and answered the patient's and wife's current questions and concerns.     (1635)   I rechecked the patient and discussed results and plan of care. She reports her pain is gone.    Findings and plan explained to the Patient. Patient discharged home with instructions regarding supportive care, medications, and reasons to return. The importance of close follow-up was reviewed. I personally reviewed the laboratory and imaging results with the Patient and answered all related questions prior to discharge.     Impression & Plan   Medical Decision Making:  The patient presents complaining of right upper quadrant tenderness  radiating around to the axillary region.  This has been associated with some decreased appetite and problems eating with associated nausea for some time.  The patient does have a history of fibromyalgia and uses medical marijuana.  I did consider gastric ulcer ulcers pneumonia ACS diverticulitis pancreatitis amongst other causes.  I have her felt that biliary colic was most likely especially with her difficult time eating bloated feeling anorexia and pain.  The patient's ultrasound is negative which does not rule out biliary colic.  The patient has had a hysterectomy and therefore is not pregnant.  Her abdominal exam is benign does not suggest appendicitis or perforation.  I considered CT scan but the risk of radiation was higher than the likelihood of these processes and therefore held on it.  And the patient was discharged home on a low-fat diet to follow-up with her primary care doctor she may need a nuclear study and/or follow-up with general surgery as an outpatient but is otherwise stable.  Symptoms return for discussed.    Diagnosis:    ICD-10-CM    1. RUQ abdominal pain R10.11    2. Biliary colic K80.50      Disposition:  Discharged to home.    Scribe Disclosure:  I, Matthew Mei, am serving as a scribe at 2:31 PM on 3/4/2020 to document services personally performed by Pankaj Saldana MD based on my observations and the provider's statements to me.    3/4/2020   [facility] EMERGENCY DEPARTMENT       Pankaj Saldana MD  03/04/20 1935

## 2020-03-05 ENCOUNTER — TELEPHONE (OUTPATIENT)
Dept: FAMILY MEDICINE | Facility: CLINIC | Age: 45
End: 2020-03-05

## 2020-03-05 DIAGNOSIS — R10.11 RUQ ABDOMINAL PAIN: Primary | ICD-10-CM

## 2020-03-05 LAB — INTERPRETATION ECG - MUSE: NORMAL

## 2020-03-05 NOTE — TELEPHONE ENCOUNTER
Order/Referral Request:  Who is requesting: Manasa Rush   Orders being requested: Endoscopy   Reason service is needed/diagnosis: Pt was see in ER for abdominal and flank pain. ER doctor recommended a scope or nuclear test. Pt has discussed endoscopy with Osmar previously and would like to get that done as soon as possible.   When are orders needed by: ASAP  Has this been discussed with Provider: Yes  Does patient have a preference on a Group/Provider/Facility? NA  Does patient have an appointment scheduled: Yes: 03/19/2020 for ER Follow UP   Where to send Orders: N/A  Okay to leave detailed message?  Yes at Cell number on file:    Telephone Information:   Mobile [phone]       Routing

## 2020-03-06 NOTE — TELEPHONE ENCOUNTER
And the patient was discharged home on a low-fat diet to follow-up with her primary care doctor she may need a nuclear study and/or follow-up with general surgery as an outpatient but is otherwise stable.     The above statement is from the ED.     I will order a EGD first, but if negative, I recommend the general surgery referral as the next step or GI referral.

## 2020-10-04 ENCOUNTER — MYC MEDICAL ADVICE (OUTPATIENT)
Dept: FAMILY MEDICINE | Facility: CLINIC | Age: 45
End: 2020-10-04

## 2020-10-05 NOTE — TELEPHONE ENCOUNTER
Routed to Dr. Prasad,    Please see patients mychart message and advise, re-route to inform    Blaine Santana RN

## 2020-10-15 ENCOUNTER — VIRTUAL VISIT (OUTPATIENT)
Dept: FAMILY MEDICINE | Facility: CLINIC | Age: 45
End: 2020-10-15
Payer: COMMERCIAL

## 2020-10-15 DIAGNOSIS — M79.7 FIBROMYALGIA: Primary | ICD-10-CM

## 2020-10-15 DIAGNOSIS — N95.1 PERIMENOPAUSAL: ICD-10-CM

## 2020-10-15 DIAGNOSIS — M05.79 RHEUMATOID ARTHRITIS INVOLVING MULTIPLE SITES WITH POSITIVE RHEUMATOID FACTOR (H): ICD-10-CM

## 2020-10-15 DIAGNOSIS — F41.9 ANXIETY: ICD-10-CM

## 2020-10-15 PROCEDURE — 99214 OFFICE O/P EST MOD 30 MIN: CPT | Mod: 95 | Performed by: FAMILY MEDICINE
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
## 2020-11-12 ENCOUNTER — E-VISIT (OUTPATIENT)
Dept: FAMILY MEDICINE | Facility: CLINIC | Age: 45
End: 2020-11-12
Payer: COMMERCIAL

## 2020-11-12 ENCOUNTER — MYC MEDICAL ADVICE (OUTPATIENT)
Dept: FAMILY MEDICINE | Facility: CLINIC | Age: 45
End: 2020-11-12

## 2020-11-12 DIAGNOSIS — B97.89 VIRAL STOMATITIS: Primary | ICD-10-CM

## 2020-11-12 DIAGNOSIS — K12.1 VIRAL STOMATITIS: Primary | ICD-10-CM

## 2020-11-12 PROCEDURE — 99421 OL DIG E/M SVC 5-10 MIN: CPT | Performed by: FAMILY MEDICINE

## 2020-11-12 NOTE — TELEPHONE ENCOUNTER
Please review.  Do you agree that patient should continue to monitor symptoms for now or should she be evaluated?    Gwen Yee RN

## 2020-11-13 NOTE — TELEPHONE ENCOUNTER
Provider E-Visit time total (minutes): 10min  was viewing if Dr. Prasad received and it created a note.  Meaghan Bardales RN

## 2020-11-16 NOTE — PATIENT INSTRUCTIONS
Thank you for choosing us for your care. Based on your symptoms and length of illness, I do not think that you need a prescription at this time.  Please follow the care advise I've provided and use the over the counter medications to help relieve your symptoms.   View your full visit summary for details by clicking on the link below.     If you're not feeling better within 2-3 days, please respond to this message and we can consider if a prescription is needed.  You can schedule an appointment right here in [place], or call [phone]  If the visit is for the same symptoms as your e-visit, we'll refund the cost of your e-visit if seen within seven days.

## 2020-11-29 ENCOUNTER — HEALTH MAINTENANCE LETTER (OUTPATIENT)
Age: 45
End: 2020-11-29

## 2021-01-15 ENCOUNTER — HEALTH MAINTENANCE LETTER (OUTPATIENT)
Age: 46
End: 2021-01-15

## 2021-02-14 ENCOUNTER — HEALTH MAINTENANCE LETTER (OUTPATIENT)
Age: 46
End: 2021-02-14

## 2021-06-10 NOTE — PROGRESS NOTES
SUBJECTIVE:   Oly Rush is a 42 year old female who presents to clinic today for the following health issues:      Acute Illness   Acute illness concerns: cough, chest congestion, hurts to take deep breath  Onset: 12/21/17    Fever: YES    Chills/Sweats: YES    Headache (location?): no    Sinus Pressure:YES    Conjunctivitis:  YES-     Ear Pain: YES- fullness    Rhinorrhea: YES    Congestion: YES- both sinus, and chest    Sore Throat: no     Cough: YES-productive of clear sputum, productive of yellow sputum, with shortness of breath, worsening over time    Wheeze: YES    Decreased Appetite: YES    Nausea: no    Vomiting: no    Diarrhea:  no    Dysuria/Freq.: no    Fatigue/Achiness: YES    Sick/Strep Exposure: YES- spouse     Therapies Tried and outcome: essential oils, OTC mucinex cold/flu    Patient here with URI symptoms for over two weeks.  Symptoms include cough and congestion in both chest and sinuses.  She thought she was getting better but a couple days ago her symptoms returned.  Has been trying the following OTC treatments with little relief: Mucinex and Dayquil.  She has not had a fever or pain in her chest.  Some wheezing, no SOB noted.      Problem list and histories reviewed & adjusted, as indicated.  Additional history: as documented      Reviewed and updated as needed this visit by clinical staffTobacco  Allergies  Meds  Problems  Med Hx  Surg Hx  Fam Hx  Soc Hx        Reviewed and updated as needed this visit by Provider         ROS:  Constitutional, HEENT, cardiovascular, pulmonary, gi and gu systems are negative, except as otherwise noted.      OBJECTIVE:   /78 (BP Location: Right arm, Patient Position: Sitting, Cuff Size: Adult Regular)  Pulse 91  Temp 99.8  F (37.7  C) (Oral)  Resp 14  Wt 195 lb (88.5 kg)  LMP 09/26/2012  SpO2 97%  BMI 32.45 kg/m2  Body mass index is 32.45 kg/(m^2).  GENERAL: healthy, alert and no distress  NECK: no adenopathy  RESP: lungs clear to  Called patient. No answer again, left voicemail.     If patient returns call, can you ask him what times would be ideal for me to call and when he would have the phone?     Otherwise, we can set up a video visit if he has a computer or laptop.     I will try him again next week as well.        auscultation - no rales, rhonchi or wheezes  CV: regular rate and rhythm, normal S1 S2, no S3 or S4, no murmur  MS: no gross musculoskeletal defects noted, no edema  SKIN: no suspicious lesions or rashes  PSYCH: mentation appears normal, affect normal/bright    Diagnostic Test Results:  none     ASSESSMENT/PLAN:   1. Acute bronchitis with symptoms > 10 days  She will hold this RX for another couple days to see if she improves again on her own.  Recommended supportive cares including warm salt water gargles, Tylenol/Ibuprofen as directed OTC, rest, humidifier.  Follow-up in 3-5 days if symptoms are worsening or not improving as expected/discussed.    - azithromycin (ZITHROMAX) 250 MG tablet; Two tablets first day, then one tablet daily for four days.  Dispense: 6 tablet; Refill: 0        Jarret Zepeda PA-C  [facility]

## 2021-09-25 ENCOUNTER — HEALTH MAINTENANCE LETTER (OUTPATIENT)
Age: 46
End: 2021-09-25

## 2021-11-12 ENCOUNTER — VIRTUAL VISIT (OUTPATIENT)
Dept: FAMILY MEDICINE | Facility: CLINIC | Age: 46
End: 2021-11-12
Payer: COMMERCIAL

## 2021-11-12 DIAGNOSIS — M79.7 FIBROMYALGIA: ICD-10-CM

## 2021-11-12 DIAGNOSIS — R76.8 ELEVATED RHEUMATOID FACTOR: ICD-10-CM

## 2021-11-12 DIAGNOSIS — Z12.31 VISIT FOR SCREENING MAMMOGRAM: ICD-10-CM

## 2021-11-12 DIAGNOSIS — N95.1 PERIMENOPAUSAL: Primary | ICD-10-CM

## 2021-11-12 PROBLEM — M06.9 RHEUMATOID ARTHRITIS (H): Status: RESOLVED | Noted: 2019-01-31 | Resolved: 2021-11-12

## 2021-11-12 PROCEDURE — 99214 OFFICE O/P EST MOD 30 MIN: CPT | Mod: 95 | Performed by: FAMILY MEDICINE

## 2021-11-12 NOTE — PROGRESS NOTES
Manasa is a 46 year old who is being evaluated via a billable video visit.      How would you like to obtain your AVS? MyChart  If the video visit is dropped, the invitation should be resent by: Text to cell phone: [phone]  Will anyone else be joining your video visit? No    Video Start Time: 10:15 AM    Assessment & Plan     Perimenopausal  Labs needed, pt also seeing GYN  - Lipid panel reflex to direct LDL Fasting; Future  - Comprehensive metabolic panel (BMP + Alb, Alk Phos, ALT, AST, Total. Bili, TP); Future  - TSH with free T4 reflex; Future  - CBC with platelets; Future  - Follicle stimulating hormone; Future  - Lutropin; Future  - Estradiol; Future  - Progesterone; Future    Fibromyalgia  Controlled, pt working on exercise, uses some medical marijuana, but not as much    Elevated rheumatoid factor  Will recheck  - Rheumatoid factor; Future    Visit for screening mammogram  agrees  - MA SCREENING DIGITAL BILAT - Future  (s+30); Future    Prescription drug management             Return in about 6 months (around 5/12/2022) for Preventive Visit.    Carrie Prasad MD  [facility] AINSLEYMOSAUL Goel is a 46 year old who presents for the following health issues  accompanied by her WIFE.    HPI       Pain History:  When did you first notice your pain? -AT LEAST 3 YEARS AGO  Have you seen anyone else for your pain? Yes - pcp  Where in your body do you have pain? Musculoskeletal problem/pain  Onset/Duration: UPPER EXTREMITIES   Description  Location: LEGS,ARMS AND SHOULDERS - bilateral  Joint Swelling: no  Redness: no  Pain: YES  Warmth: no  Intensity:  severe, 5/10  Progression of Symptoms:  improving and constant  Accompanying signs and symptoms:   Fevers: no  Numbness/tingling/weakness: YES- weakness and numbness   History  Trauma to the area: no  Recent illness:  no  Previous similar problem: YES  Previous evaluation:  YES  Precipitating or alleviating factors:  Aggravating factors  "include: overuse and cold or damp weather  Therapies tried and outcome: acupuncture, pilates and chiropractor-did not help symptoms.      Working from home, this is long term now, hydrbid before, not a huge transition, but told it is forever. Doing ok, found her groove.   Edis lives at home and she is leaving in June, joined the [employer], and empty nesters.   Claudia and Belden living with Daniel and living [place].  Her and Orly are doing well. More anxiety for sure.   Neighbors are ong and the grandpa passed away from covid,   Lost her grandpa last year from covid.    Menopause sx, hysterectomy, so no periods, but hot flashes, weight gain, mood swings, feels comfortable coming in for labs now, she got the J@J vaccine.    Some depression.     The pain is good, stable, noticed some memory issues on the medical marijuana and so watching the weather as this is a big factor. Not using the cannibals daily and so this has helped. Less pain, and memory is better.  Some times hard to focus. Thinks this is due to menopausal hormones etc.  Is going to see a NP that specializes in hormones to dive into this deeper, but wanting me to order the hormones          Review of Systems   CONSTITUTIONAL: NEGATIVE for fever, chills, change in weight  ENT/MOUTH: NEGATIVE for ear, mouth and throat problems  RESP: NEGATIVE for significant cough or SOB  CV: NEGATIVE for chest pain, palpitations or peripheral edema      Objective    Vitals - Patient Reported  Weight (Patient Reported): 90.7 kg (200 lb)  Height (Patient Reported): 165.1 cm (5' 5\")  BMI (Based on Pt Reported Ht/Wt): 33.28  Pulse (Patient Reported): 84  Pain Score: Moderate Pain (5)      Vitals:  No vitals were obtained today due to virtual visit.    Physical Exam   GENERAL: Healthy, alert and no distress  EYES: Eyes grossly normal to inspection.  No discharge or erythema, or obvious scleral/conjunctival abnormalities.  RESP: No audible wheeze, cough, or visible cyanosis.  " No visible retractions or increased work of breathing.    SKIN: Visible skin clear. No significant rash, abnormal pigmentation or lesions.  NEURO: Cranial nerves grossly intact.  Mentation and speech appropriate for age.  PSYCH: Mentation appears normal, affect normal/bright, judgement and insight intact, normal speech and appearance well-groomed.                Video-Visit Details    Type of service:  Video Visit    Video End Time:10:40    Originating Location (pt. Location): Home    Distant Location (provider location):  [facility]     Platform used for Video Visit: Wynlink

## 2022-02-13 ENCOUNTER — TELEPHONE (OUTPATIENT)
Dept: NURSING | Facility: CLINIC | Age: 47
End: 2022-02-13

## 2022-02-13 ENCOUNTER — TELEPHONE (OUTPATIENT)
Dept: NURSING | Facility: CLINIC | Age: 47
End: 2022-02-13
Payer: COMMERCIAL

## 2022-02-13 ENCOUNTER — OFFICE VISIT (OUTPATIENT)
Dept: URGENT CARE | Facility: URGENT CARE | Age: 47
End: 2022-02-13
Payer: COMMERCIAL

## 2022-02-13 VITALS
SYSTOLIC BLOOD PRESSURE: 110 MMHG | TEMPERATURE: 98.1 F | BODY MASS INDEX: 37.01 KG/M2 | WEIGHT: 222.4 LBS | RESPIRATION RATE: 16 BRPM | DIASTOLIC BLOOD PRESSURE: 70 MMHG | OXYGEN SATURATION: 99 % | HEART RATE: 80 BPM

## 2022-02-13 DIAGNOSIS — R30.0 DYSURIA: Primary | ICD-10-CM

## 2022-02-13 DIAGNOSIS — N76.0 BV (BACTERIAL VAGINOSIS): ICD-10-CM

## 2022-02-13 DIAGNOSIS — B96.89 BV (BACTERIAL VAGINOSIS): ICD-10-CM

## 2022-02-13 LAB
ALBUMIN UR-MCNC: NEGATIVE MG/DL
APPEARANCE UR: CLEAR
BACTERIA #/AREA URNS HPF: ABNORMAL /HPF
BILIRUB UR QL STRIP: NEGATIVE
CLUE CELLS: PRESENT
COLOR UR AUTO: YELLOW
GLUCOSE UR STRIP-MCNC: NEGATIVE MG/DL
HGB UR QL STRIP: ABNORMAL
KETONES UR STRIP-MCNC: NEGATIVE MG/DL
LEUKOCYTE ESTERASE UR QL STRIP: ABNORMAL
NITRATE UR QL: NEGATIVE
PH UR STRIP: 7 [PH] (ref 5–7)
RBC #/AREA URNS AUTO: ABNORMAL /HPF
SP GR UR STRIP: 1.01 (ref 1–1.03)
SQUAMOUS #/AREA URNS AUTO: ABNORMAL /LPF
TRICHOMONAS, WET PREP: ABNORMAL
UROBILINOGEN UR STRIP-ACNC: 0.2 E.U./DL
WBC #/AREA URNS AUTO: ABNORMAL /HPF
WBC'S/HIGH POWER FIELD, WET PREP: ABNORMAL
YEAST, WET PREP: ABNORMAL

## 2022-02-13 PROCEDURE — 81001 URINALYSIS AUTO W/SCOPE: CPT | Performed by: FAMILY MEDICINE

## 2022-02-13 PROCEDURE — 87210 SMEAR WET MOUNT SALINE/INK: CPT | Performed by: FAMILY MEDICINE

## 2022-02-13 PROCEDURE — 99213 OFFICE O/P EST LOW 20 MIN: CPT | Performed by: FAMILY MEDICINE

## 2022-02-13 RX ORDER — METRONIDAZOLE 7.5 MG/G
1 GEL VAGINAL DAILY
Qty: 35 G | Refills: 0 | Status: SHIPPED | OUTPATIENT
Start: 2022-02-13 | End: 2022-02-20

## 2022-02-13 NOTE — TELEPHONE ENCOUNTER
Telephone call:    Pt called reporting she was just seen in urgent care today and pharmacy stated they did not get her prescription for metrogel.  Pt's preferred pharmacy was called and they stated they had the order and are open until 6 pm.  Called pt back and pt verbalized understanding of above and plans to pick it up today.    Elza Linda RN  02/13/22 4:18 PM  [facility] Nurse Advisor

## 2022-02-13 NOTE — PROGRESS NOTES
Chief Complaint   Patient presents with     UTI     Initial differential diagnosis included but was not restricted to   Differential Diagnosis:  UTI: UTI, Dysuria, Urethritis and Vaginitis  Medical Decision Making:    ASSESMENT AND PLAN   Oly was seen today for uti.    Diagnoses and all orders for this visit:    Dysuria  -     UA macro with reflex to Microscopic and Culture - Clinc Collect  -     Urine Microscopic  -     Wet prep - Clinic Collect  -     Urine Culture Aerobic Bacterial - lab collect; Future    BV (bacterial vaginosis)  -     metroNIDAZOLE (METROGEL) 0.75 % vaginal gel; Place 1 applicator (5 g) vaginally daily for 7 days        Tylenol, Fluids and Rest  Routine discharge counseling was given to the patient and the patient understands that worsening, changing or persistent symptoms should prompt an immediate call or follow up with their primary physician or the emergency department. The importance of appropriate follow up was also discussed with the patient.     I have reviewed the nursing notes.  Review of the result(s) of each unique test -    Time  spent on the date of the encounter doing chart review, review of test results, interpretation of tests, patient visit and documentation   see orders in Epic  Pt verbalized and agreed with the plan and is aware of the worsening symptoms for which would need to follow up .  Pt was stable during time of discharge from the clinic     SUBJECTIVE     Oly Rush is a 46 year old female presenting with a chief complaint of    Chief Complaint   Patient presents with     UTI     UTI    Onset of symptoms was 2day(s).  Course of illness is waxing and waning  Severity moderate  Current and associated symptoms dysuria, frequency and pain mostly at the end of urination   Treatment and measures tried None  Predisposing factors include history of frequent UTI's  Patient denies rigors, flank pain, temperature > 101 degrees F. and vomiting              Past  Medical History:   Diagnosis Date     Abnormal Pap smear     cone bx     Anxiety      Arthritis      Cancer (H)      Current Outpatient Medications   Medication Sig Dispense Refill     metroNIDAZOLE (METROGEL) 0.75 % vaginal gel Place 1 applicator (5 g) vaginally daily for 7 days 35 g 0     ascorbic acid (VITAMIN C) 1000 MG TABS Take 1 tablet (1,000 mg) by mouth daily 30 tablet      cholecalciferol (VITAMIN D3) 5000 UNITS TABS tablet Take by mouth daily       Dionne, Zingiber officinalis, (DIONNE PO)        HM OMEGA-3-6-9 FATTY ACIDS PO        medical cannabis (Patient's own supply) See Admin Instructions (The purpose of this order is to document that the patient reports taking medical cannabis.  This is not a prescription, and is not used to certify that the patient has a qualifying medical condition.)       Probiotic Product (ACIDOPHILUS PROBIOTIC BLEND) CAPS        vitamin B complex with vitamin C (VITAMIN  B COMPLEX) TABS tablet Take 1 tablet by mouth daily  0     Social History     Tobacco Use     Smoking status: Never Smoker     Smokeless tobacco: Never Used   Substance Use Topics     Alcohol use: Yes     Alcohol/week: 0.0 standard drinks     Comment: socially      Family History   Problem Relation Age of Onset     Ovarian Cancer Maternal Aunt      Other Cancer Maternal Aunt         larynx cancer     C.A.D. No family hx of      Diabetes No family hx of      Hypertension No family hx of      Breast Cancer No family hx of      Cancer - colorectal No family hx of          ROS:    10 point ROS of systems including Constitutional, Eyes, Respiratory, Cardiovascular, Gastroenterologyntegumentary, Muscularskeletal, Psychiatric ,neurological were all negative except for pertinent positives noted in my HPI         OBJECTIVE:    /70 (BP Location: Right arm, Patient Position: Chair, Cuff Size: Adult Large)   Pulse 80   Temp 98.1  F (36.7  C) (Oral)   Resp 16   Wt 100.9 kg (222 lb 6.4 oz)   LMP 09/26/2012    SpO2 99%   Breastfeeding No   BMI 37.01 kg/m    GENERAL APPEARANCE: healthy, alert and no distress   mouth without ulcers, erythema or lesions  CV: regular rates and rhythm, normal S1 S2, no murmur noted  No CVA tenderness noted   ABDOMEN:  soft, nontender, no HSM or masses and bowel sounds normal  PSYCH: mentation appears normal  Physical Exam      (Note was completed, in part, with [employer] voice-recognition software. Documentation reviewed, but some grammatical, spelling, and word errors may remain.)  Rahel Hsu MD on 2/13/2022 at 3:37 PM

## 2022-02-13 NOTE — TELEPHONE ENCOUNTER
Patient calling reporting she took an over the counter UTI test that came back positive. Patient wondering how to get an antibiotic. Advised a UA/UC would be needed to be run by the lab for proper antibiotics to be processed. Patient to go to urgent care for urine testing.     Maureen May RN 02/13/22 1:00 PM   [facility] Triage Nurse Advisor

## 2022-02-15 ENCOUNTER — VIRTUAL VISIT (OUTPATIENT)
Dept: FAMILY MEDICINE | Facility: CLINIC | Age: 47
End: 2022-02-15
Payer: COMMERCIAL

## 2022-02-15 ENCOUNTER — LAB (OUTPATIENT)
Dept: LAB | Facility: CLINIC | Age: 47
End: 2022-02-15
Payer: COMMERCIAL

## 2022-02-15 DIAGNOSIS — B96.89 BV (BACTERIAL VAGINOSIS): ICD-10-CM

## 2022-02-15 DIAGNOSIS — N76.0 BV (BACTERIAL VAGINOSIS): ICD-10-CM

## 2022-02-15 DIAGNOSIS — Z13.6 CARDIOVASCULAR SCREENING; LDL GOAL LESS THAN 160: ICD-10-CM

## 2022-02-15 DIAGNOSIS — N95.1 PERIMENOPAUSAL: ICD-10-CM

## 2022-02-15 DIAGNOSIS — R11.0 NAUSEA: ICD-10-CM

## 2022-02-15 DIAGNOSIS — Z12.31 VISIT FOR SCREENING MAMMOGRAM: ICD-10-CM

## 2022-02-15 DIAGNOSIS — M54.6 ACUTE MIDLINE THORACIC BACK PAIN: ICD-10-CM

## 2022-02-15 DIAGNOSIS — R30.0 DYSURIA: Primary | ICD-10-CM

## 2022-02-15 LAB
ALBUMIN UR-MCNC: NEGATIVE MG/DL
APPEARANCE UR: CLEAR
BILIRUB UR QL STRIP: NEGATIVE
COLOR UR AUTO: YELLOW
GLUCOSE UR STRIP-MCNC: NEGATIVE MG/DL
HGB UR QL STRIP: ABNORMAL
KETONES UR STRIP-MCNC: NEGATIVE MG/DL
LEUKOCYTE ESTERASE UR QL STRIP: ABNORMAL
NITRATE UR QL: NEGATIVE
PH UR STRIP: 7 [PH] (ref 5–7)
RBC #/AREA URNS AUTO: NORMAL /HPF
SP GR UR STRIP: 1.01 (ref 1–1.03)
UROBILINOGEN UR STRIP-ACNC: 0.2 E.U./DL
WBC #/AREA URNS AUTO: NORMAL /HPF

## 2022-02-15 PROCEDURE — 81001 URINALYSIS AUTO W/SCOPE: CPT

## 2022-02-15 PROCEDURE — 99214 OFFICE O/P EST MOD 30 MIN: CPT | Mod: 95 | Performed by: FAMILY MEDICINE

## 2022-02-15 RX ORDER — CLINDAMYCIN HCL 300 MG
300 CAPSULE ORAL 3 TIMES DAILY
Qty: 30 CAPSULE | Refills: 0 | Status: SHIPPED | OUTPATIENT
Start: 2022-02-15 | End: 2022-02-25

## 2022-02-15 RX ORDER — ONDANSETRON 4 MG/1
4 TABLET, FILM COATED ORAL EVERY 8 HOURS PRN
Qty: 10 TABLET | Refills: 3 | Status: SHIPPED | OUTPATIENT
Start: 2022-02-15 | End: 2023-08-18

## 2022-02-15 NOTE — PROGRESS NOTES
Manasa is a 46 year old who is being evaluated via a billable video visit.   Started at 8:50     What phone number would you like to be contacted at? [phone]  How would you like to obtain your AVS? MyChart    Assessment & Plan     Dysuria  Will treat, due for labs  - clindamycin (CLEOCIN) 300 MG capsule; Take 1 capsule (300 mg) by mouth 3 times daily for 10 days  - CBC with platelets and differential; Future    BV (bacterial vaginosis)  Will treat  - clindamycin (CLEOCIN) 300 MG capsule; Take 1 capsule (300 mg) by mouth 3 times daily for 10 days  - Comprehensive metabolic panel (BMP + Alb, Alk Phos, ALT, AST, Total. Bili, TP); Future  - UA Macro with Reflex to Micro and Culture - lab collect; Future    Acute midline thoracic back pain  Uncertain the couse, Follow up if not improving  - clindamycin (CLEOCIN) 300 MG capsule; Take 1 capsule (300 mg) by mouth 3 times daily for 10 days  - Comprehensive metabolic panel (BMP + Alb, Alk Phos, ALT, AST, Total. Bili, TP); Future  - Rheumatoid factor; Future    Perimenopausal  Due for labs, discussed signs and sx  - Estradiol; Future  - Progesterone; Future  - Follicle stimulating hormone; Future  - TSH with free T4 reflex; Future    Nausea  Due to recent illness, will give rx  - ondansetron (ZOFRAN) 4 MG tablet; Take 1 tablet (4 mg) by mouth every 8 hours as needed for nausea    Visit for screening mammogram    - MA SCREENING DIGITAL BILAT - Future  (s+30); Future    CARDIOVASCULAR SCREENING; LDL GOAL LESS THAN 160    - Lipid panel reflex to direct LDL Fasting; Future    Ordering of each unique test  27 minutes spent on the date of the encounter doing chart review, history and exam, documentation and further activities per the note       Regular exercise    Return for Preventive Visit.    Carrie Prasad MD  [facility]    Rin Goel is a 46 year old who presents for the following health issues  accompanied by her .    HPI     ED/UC  Followup:    Facility:  [facility]  Date of visit: 2/13/22  Reason for visit: UTI,   Current Status: feverish, nausea, no appetite, has been drinking water and cranberry juice. Still having right sided pain     Went to Urgent care on Sunday, allergic to flagyl, but they gave it to her anyway. They told her it was topical, so it should be ok. This was upseting to her, so lost confidence. Then she could not get a hold of the doc yesterday.  Right side is now painful, this happened 5-6 yrs ago, like it maybe traveling to her kidney, it is a little tender, but not severe. If she pushes on it, very tender, but able to tolerate 5/10 pain.   No fevers. Sweaty, clammy. Lost her appetite, nauseated. On and off, yest but took teja and that help.  Today feels a bit better, n nausea.  When she empties the bladder, at the end of flow, it is painful, a little bit of blood, feels like a contraction feeling, and burning. Started mild, the day before urgent care.  Milagros is now 3 yr old, grandchild     Joint pains, a bit better now, taking medical marijuana and found the right dose, and no longer feeling foggy, pain is improved, needing rheum factor rechecked      Review of Systems   CONSTITUTIONAL: NEGATIVE for fever, chills, change in weight  ENT/MOUTH: NEGATIVE for ear, mouth and throat problems  RESP: NEGATIVE for significant cough or SOB  CV: NEGATIVE for chest pain, palpitations or peripheral edema      Objective           Vitals:  No vitals were obtained today due to virtual visit.    Physical Exam   healthy, alert and no distress  PSYCH: Alert and oriented times 3; coherent speech, normal   rate and volume, able to articulate logical thoughts, able   to abstract reason, no tangential thoughts, no hallucinations   or delusions  Her affect is normal  RESP: No cough, no audible wheezing, able to talk in full sentences  Remainder of exam unable to be completed due to telephone visits              Ended video at 9:18    doximity  Pt was at home  Phone call duration: video visit 20 minutes

## 2022-03-05 ENCOUNTER — OFFICE VISIT (OUTPATIENT)
Dept: URGENT CARE | Facility: URGENT CARE | Age: 47
End: 2022-03-05
Payer: COMMERCIAL

## 2022-03-05 VITALS
RESPIRATION RATE: 16 BRPM | DIASTOLIC BLOOD PRESSURE: 83 MMHG | OXYGEN SATURATION: 98 % | TEMPERATURE: 97.2 F | HEART RATE: 84 BPM | SYSTOLIC BLOOD PRESSURE: 123 MMHG

## 2022-03-05 DIAGNOSIS — B37.0 ORAL THRUSH: Primary | ICD-10-CM

## 2022-03-05 PROCEDURE — 99213 OFFICE O/P EST LOW 20 MIN: CPT | Performed by: FAMILY MEDICINE

## 2022-03-05 RX ORDER — NYSTATIN 100000/ML
500000 SUSPENSION, ORAL (FINAL DOSE FORM) ORAL 4 TIMES DAILY
Qty: 140 ML | Refills: 0 | Status: SHIPPED | OUTPATIENT
Start: 2022-03-05 | End: 2022-03-12

## 2022-03-05 RX ORDER — CLINDAMYCIN HCL 300 MG
300 CAPSULE ORAL 4 TIMES DAILY
COMMUNITY
End: 2022-07-06

## 2022-03-05 NOTE — PROGRESS NOTES
Chief Complaint   Patient presents with     Urgent Care     Mouth/Lip Problem     Possible thrush-White throat-Patient on Clindamycin for UTI      Initial differential diagnosis included but was not restricted to   Differential Diagnosis:  Oral thrush , apthae, mouth sore  Medical Decision Making:    ASSESMENT AND PLAN   Oly was seen today for urgent care and mouth/lip problem.    Diagnoses and all orders for this visit:    Oral thrush  -     nystatin (MYCOSTATIN) 837500 UNIT/ML suspension; Take 5 mLs (500,000 Units) by mouth 4 times daily for 7 days        Tylenol, Fluids and Rest  Routine discharge counseling was given to the patient and the patient understands that worsening, changing or persistent symptoms should prompt an immediate call or follow up with their primary physician or the emergency department. The importance of appropriate follow up was also discussed with the patient.     I have reviewed the nursing notes.  Review of the result(s) of each unique test -   X-Ray was not done.    Time  spent on the date of the encounter doing chart review, patient visit and documentation   see orders in Epic  Pt verbalized and agreed with the plan and is aware of the worsening symptoms for which would need to follow up .  Pt was stable during time of discharge from the clinic     SUBJECTIVE     Oly Rush is a 46 year old female presenting with a chief complaint of    Chief Complaint   Patient presents with     Urgent Care     Mouth/Lip Problem     Possible thrush-White throat-Patient on Clindamycin for UTI            Oly Rush is a 46 year old female currently being treated with clindamycin for BV  presenting with a chief complaint of coating over the tongue along with some soreness of the tongue area which she has noticed for a day.  She denies any sore throat or any other symptoms.  Her symptoms will be we are getting better.  She is on probiotics while she is on antibiotic. She is an  established patient of [facility].  Onset of symptoms was 1 day(s) ago.  Course of illness is worsening.    Severity moderate  Current and Associated symptoms: see above   Treatment measures tried include None tried.  Predisposing factors include recent antibiotic use .    Past Medical History:   Diagnosis Date     Abnormal Pap smear     cone bx     Anxiety      Arthritis      Cancer (H)      Current Outpatient Medications   Medication Sig Dispense Refill     ascorbic acid (VITAMIN C) 1000 MG TABS Take 1 tablet (1,000 mg) by mouth daily 30 tablet      cholecalciferol (VITAMIN D3) 5000 UNITS TABS tablet Take by mouth daily       clindamycin (CLEOCIN) 300 MG capsule Take 300 mg by mouth 4 times daily       Ginger, Zingiber officinalis, (GINGER PO)        HM OMEGA-3-6-9 FATTY ACIDS PO        medical cannabis (Patient's own supply) See Admin Instructions (The purpose of this order is to document that the patient reports taking medical cannabis.  This is not a prescription, and is not used to certify that the patient has a qualifying medical condition.)       nystatin (MYCOSTATIN) 421886 UNIT/ML suspension Take 5 mLs (500,000 Units) by mouth 4 times daily for 7 days 140 mL 0     Probiotic Product (ACIDOPHILUS PROBIOTIC BLEND) CAPS        vitamin B complex with vitamin C (VITAMIN  B COMPLEX) TABS tablet Take 1 tablet by mouth daily  0     ondansetron (ZOFRAN) 4 MG tablet Take 1 tablet (4 mg) by mouth every 8 hours as needed for nausea (Patient not taking: Reported on 3/5/2022) 10 tablet 3     Social History     Tobacco Use     Smoking status: Never Smoker     Smokeless tobacco: Never Used   Substance Use Topics     Alcohol use: Yes     Alcohol/week: 0.0 standard drinks     Comment: socially      Family History   Problem Relation Age of Onset     Ovarian Cancer Maternal Aunt      Other Cancer Maternal Aunt         larynx cancer     C.A.D. No family hx of      Diabetes No family hx of      Hypertension No family hx of       Breast Cancer No family hx of      Cancer - colorectal No family hx of          ROS:    10 point ROS of systems including Constitutional, Eyes, Respiratory, Cardiovascular, Gastroenterology, Genitourinary, Integumentary, Muscularskeletal, Psychiatric ,neurological were all negative except for pertinent positives noted in my HPI         OBJECTIVE:    /83   Pulse 84   Temp 97.2  F (36.2  C) (Tympanic)   Resp 16   LMP 09/26/2012   SpO2 98%   Breastfeeding No   GENERAL APPEARANCE: healthy, alert and no distress  EYES: EOMI,  PERRL, conjunctiva clear  HENT: ear canals and TM's normal.  Nose and mouth white coating over the tongue along with some surrounding redness in the posterior aspect of the tongue  CV: regular rates and rhythm, normal S1 S2, no murmur noted  PSYCH: mentation appears normal  Physical Exam      (Note was completed, in part, with [employer] voice-recognition software. Documentation reviewed, but some grammatical, spelling, and word errors may remain.)  Rahel Hsu MD on 3/5/2022 at 5:12 PM

## 2022-03-12 ENCOUNTER — HEALTH MAINTENANCE LETTER (OUTPATIENT)
Age: 47
End: 2022-03-12

## 2022-03-25 ENCOUNTER — ANCILLARY PROCEDURE (OUTPATIENT)
Dept: MAMMOGRAPHY | Facility: CLINIC | Age: 47
End: 2022-03-25
Payer: COMMERCIAL

## 2022-03-25 DIAGNOSIS — Z12.31 VISIT FOR SCREENING MAMMOGRAM: ICD-10-CM

## 2022-03-25 PROCEDURE — 77063 BREAST TOMOSYNTHESIS BI: CPT | Mod: TC | Performed by: RADIOLOGY

## 2022-03-25 PROCEDURE — 77067 SCR MAMMO BI INCL CAD: CPT | Mod: TC | Performed by: RADIOLOGY

## 2022-03-28 ENCOUNTER — TRANSFERRED RECORDS (OUTPATIENT)
Dept: HEALTH INFORMATION MANAGEMENT | Facility: CLINIC | Age: 47
End: 2022-03-28
Payer: COMMERCIAL

## 2022-05-27 ENCOUNTER — LAB (OUTPATIENT)
Dept: LAB | Facility: CLINIC | Age: 47
End: 2022-05-27
Payer: COMMERCIAL

## 2022-05-27 DIAGNOSIS — B96.89 BV (BACTERIAL VAGINOSIS): ICD-10-CM

## 2022-05-27 DIAGNOSIS — N95.1 PERIMENOPAUSAL: ICD-10-CM

## 2022-05-27 DIAGNOSIS — Z13.6 CARDIOVASCULAR SCREENING; LDL GOAL LESS THAN 160: ICD-10-CM

## 2022-05-27 DIAGNOSIS — N76.0 BV (BACTERIAL VAGINOSIS): ICD-10-CM

## 2022-05-27 DIAGNOSIS — R30.0 DYSURIA: ICD-10-CM

## 2022-05-27 DIAGNOSIS — M54.6 ACUTE MIDLINE THORACIC BACK PAIN: ICD-10-CM

## 2022-05-27 LAB
ALBUMIN SERPL-MCNC: 3.9 G/DL (ref 3.4–5)
ALP SERPL-CCNC: 109 U/L (ref 40–150)
ALT SERPL W P-5'-P-CCNC: 41 U/L (ref 0–50)
ANION GAP SERPL CALCULATED.3IONS-SCNC: 3 MMOL/L (ref 3–14)
AST SERPL W P-5'-P-CCNC: 24 U/L (ref 0–45)
BASOPHILS # BLD AUTO: 0 10E3/UL (ref 0–0.2)
BASOPHILS NFR BLD AUTO: 0 %
BILIRUB SERPL-MCNC: 0.5 MG/DL (ref 0.2–1.3)
BUN SERPL-MCNC: 11 MG/DL (ref 7–30)
CALCIUM SERPL-MCNC: 8.8 MG/DL (ref 8.5–10.1)
CHLORIDE BLD-SCNC: 108 MMOL/L (ref 94–109)
CHOLEST SERPL-MCNC: 199 MG/DL
CO2 SERPL-SCNC: 28 MMOL/L (ref 20–32)
CREAT SERPL-MCNC: 0.8 MG/DL (ref 0.52–1.04)
EOSINOPHIL # BLD AUTO: 0.2 10E3/UL (ref 0–0.7)
EOSINOPHIL NFR BLD AUTO: 2 %
ERYTHROCYTE [DISTWIDTH] IN BLOOD BY AUTOMATED COUNT: 12.6 % (ref 10–15)
ESTRADIOL SERPL-MCNC: 54 PG/ML
FASTING STATUS PATIENT QL REPORTED: YES
FSH SERPL-ACNC: 37 IU/L
GFR SERPL CREATININE-BSD FRML MDRD: >90 ML/MIN/1.73M2
GLUCOSE BLD-MCNC: 90 MG/DL (ref 70–99)
HCT VFR BLD AUTO: 44.7 % (ref 35–47)
HDLC SERPL-MCNC: 50 MG/DL
HGB BLD-MCNC: 14.6 G/DL (ref 11.7–15.7)
LDLC SERPL CALC-MCNC: 125 MG/DL
LYMPHOCYTES # BLD AUTO: 2.7 10E3/UL (ref 0.8–5.3)
LYMPHOCYTES NFR BLD AUTO: 26 %
MCH RBC QN AUTO: 29.7 PG (ref 26.5–33)
MCHC RBC AUTO-ENTMCNC: 32.7 G/DL (ref 31.5–36.5)
MCV RBC AUTO: 91 FL (ref 78–100)
MONOCYTES # BLD AUTO: 0.7 10E3/UL (ref 0–1.3)
MONOCYTES NFR BLD AUTO: 7 %
NEUTROPHILS # BLD AUTO: 6.6 10E3/UL (ref 1.6–8.3)
NEUTROPHILS NFR BLD AUTO: 65 %
NONHDLC SERPL-MCNC: 149 MG/DL
PLATELET # BLD AUTO: 344 10E3/UL (ref 150–450)
POTASSIUM BLD-SCNC: 4.1 MMOL/L (ref 3.4–5.3)
PROGEST SERPL-MCNC: <0.2 NG/ML
PROT SERPL-MCNC: 7.6 G/DL (ref 6.8–8.8)
RBC # BLD AUTO: 4.92 10E6/UL (ref 3.8–5.2)
SODIUM SERPL-SCNC: 139 MMOL/L (ref 133–144)
TRIGL SERPL-MCNC: 121 MG/DL
TSH SERPL DL<=0.005 MIU/L-ACNC: 1.83 MU/L (ref 0.4–4)
WBC # BLD AUTO: 10.1 10E3/UL (ref 4–11)

## 2022-05-27 PROCEDURE — 80061 LIPID PANEL: CPT

## 2022-05-27 PROCEDURE — 84144 ASSAY OF PROGESTERONE: CPT

## 2022-05-27 PROCEDURE — 86431 RHEUMATOID FACTOR QUANT: CPT

## 2022-05-27 PROCEDURE — 80050 GENERAL HEALTH PANEL: CPT

## 2022-05-27 PROCEDURE — 83001 ASSAY OF GONADOTROPIN (FSH): CPT

## 2022-05-27 PROCEDURE — 82670 ASSAY OF TOTAL ESTRADIOL: CPT

## 2022-05-27 PROCEDURE — 36415 COLL VENOUS BLD VENIPUNCTURE: CPT

## 2022-05-31 LAB — RHEUMATOID FACT SER NEPH-ACNC: 24 IU/ML

## 2022-06-02 ENCOUNTER — MYC MEDICAL ADVICE (OUTPATIENT)
Dept: FAMILY MEDICINE | Facility: CLINIC | Age: 47
End: 2022-06-02
Payer: COMMERCIAL

## 2022-07-03 ASSESSMENT — ENCOUNTER SYMPTOMS
NAUSEA: 0
DIARRHEA: 0
DYSURIA: 0
DIZZINESS: 0
PALPITATIONS: 0
CONSTIPATION: 0
ABDOMINAL PAIN: 0
COUGH: 0
HEADACHES: 0
ARTHRALGIAS: 1
PARESTHESIAS: 0
NERVOUS/ANXIOUS: 1
HEARTBURN: 1
HEMATOCHEZIA: 0
SHORTNESS OF BREATH: 0
SORE THROAT: 0
WEAKNESS: 0
FREQUENCY: 0
BREAST MASS: 0
HEMATURIA: 0
MYALGIAS: 1
CHILLS: 0
FEVER: 0
EYE PAIN: 0
JOINT SWELLING: 1

## 2022-07-06 ENCOUNTER — OFFICE VISIT (OUTPATIENT)
Dept: FAMILY MEDICINE | Facility: CLINIC | Age: 47
End: 2022-07-06
Payer: COMMERCIAL

## 2022-07-06 VITALS
HEART RATE: 80 BPM | RESPIRATION RATE: 16 BRPM | WEIGHT: 223.4 LBS | TEMPERATURE: 97.9 F | BODY MASS INDEX: 35.9 KG/M2 | HEIGHT: 66 IN | OXYGEN SATURATION: 97 % | SYSTOLIC BLOOD PRESSURE: 114 MMHG | DIASTOLIC BLOOD PRESSURE: 78 MMHG

## 2022-07-06 DIAGNOSIS — M79.7 FIBROMYALGIA: ICD-10-CM

## 2022-07-06 DIAGNOSIS — R76.8 ELEVATED RHEUMATOID FACTOR: ICD-10-CM

## 2022-07-06 DIAGNOSIS — Z00.00 ROUTINE GENERAL MEDICAL EXAMINATION AT A HEALTH CARE FACILITY: Primary | ICD-10-CM

## 2022-07-06 DIAGNOSIS — M25.50 MULTIPLE JOINT PAIN: ICD-10-CM

## 2022-07-06 DIAGNOSIS — N89.8 VAGINAL DRYNESS: ICD-10-CM

## 2022-07-06 DIAGNOSIS — Z12.4 CERVICAL CANCER SCREENING: ICD-10-CM

## 2022-07-06 DIAGNOSIS — Z12.11 SCREEN FOR COLON CANCER: ICD-10-CM

## 2022-07-06 LAB
CRP SERPL-MCNC: 10.9 MG/L
ERYTHROCYTE [SEDIMENTATION RATE] IN BLOOD BY WESTERGREN METHOD: 12 MM/HR (ref 0–20)

## 2022-07-06 PROCEDURE — 36415 COLL VENOUS BLD VENIPUNCTURE: CPT | Performed by: FAMILY MEDICINE

## 2022-07-06 PROCEDURE — 99396 PREV VISIT EST AGE 40-64: CPT | Performed by: FAMILY MEDICINE

## 2022-07-06 PROCEDURE — 87624 HPV HI-RISK TYP POOLED RSLT: CPT | Performed by: FAMILY MEDICINE

## 2022-07-06 PROCEDURE — 86200 CCP ANTIBODY: CPT | Performed by: FAMILY MEDICINE

## 2022-07-06 PROCEDURE — 86140 C-REACTIVE PROTEIN: CPT | Performed by: FAMILY MEDICINE

## 2022-07-06 PROCEDURE — 85652 RBC SED RATE AUTOMATED: CPT | Performed by: FAMILY MEDICINE

## 2022-07-06 PROCEDURE — 99214 OFFICE O/P EST MOD 30 MIN: CPT | Mod: 25 | Performed by: FAMILY MEDICINE

## 2022-07-06 PROCEDURE — G0145 SCR C/V CYTO,THINLAYER,RESCR: HCPCS | Performed by: FAMILY MEDICINE

## 2022-07-06 RX ORDER — ESTRADIOL 10 UG/1
10 INSERT VAGINAL
Qty: 12 TABLET | Refills: 3 | Status: SHIPPED | OUTPATIENT
Start: 2022-07-07 | End: 2023-08-18

## 2022-07-06 ASSESSMENT — ENCOUNTER SYMPTOMS
FEVER: 0
EYE PAIN: 0
SHORTNESS OF BREATH: 0
CONSTIPATION: 0
FREQUENCY: 0
COUGH: 0
PARESTHESIAS: 0
DIARRHEA: 0
CHILLS: 0
BREAST MASS: 0
HEARTBURN: 1
SORE THROAT: 0
DIZZINESS: 0
PALPITATIONS: 0
HEADACHES: 0
NAUSEA: 0
WEAKNESS: 0
HEMATURIA: 0
MYALGIAS: 1
NERVOUS/ANXIOUS: 1
HEMATOCHEZIA: 0
ARTHRALGIAS: 1
ABDOMINAL PAIN: 0
JOINT SWELLING: 1
DYSURIA: 0

## 2022-07-06 ASSESSMENT — PAIN SCALES - GENERAL: PAINLEVEL: NO PAIN (0)

## 2022-07-06 NOTE — NURSING NOTE
"Chief Complaint   Patient presents with     Physical     And pap     Initial /78 (BP Location: Right arm, Patient Position: Sitting, Cuff Size: Adult Large)   Pulse 80   Temp 97.9  F (36.6  C) (Oral)   Resp 16   Ht 1.664 m (5' 5.5\")   Wt 101.3 kg (223 lb 6.4 oz)   LMP 09/26/2012   SpO2 97%   BMI 36.61 kg/m   Estimated body mass index is 36.61 kg/m  as calculated from the following:    Height as of this encounter: 1.664 m (5' 5.5\").    Weight as of this encounter: 101.3 kg (223 lb 6.4 oz).  BP completed using cuff size large right arm    Lisa Magill, CMA    "

## 2022-07-06 NOTE — PROGRESS NOTES
SUBJECTIVE:   CC: Oly Rush is an 47 year old woman who presents for preventive health visit.       Patient has been advised of split billing requirements and indicates understanding: Yes  Healthy Habits:     Getting at least 3 servings of Calcium per day:  Yes    Bi-annual eye exam:  Yes    Dental care twice a year:  Yes    Sleep apnea or symptoms of sleep apnea:  None    Diet:  Regular (no restrictions)    Frequency of exercise:  2-3 days/week    Duration of exercise:  30-45 minutes    Taking medications regularly:  Yes    PHQ-2 Total Score: 2    Chronic pain is much improved due to medical marijuana, arms and shoulders are the problem and her thigh areas. No back pain. No wrist or foot pain. Using canabis at night. Or early evening if very busy and at week-ends.no vaping. Does the flower and smokes it.   Some pain in joints in the morning or when over does it.  Has to admit she cannot do everything like she used to    Stiffness in morning, does not last long, 10min or so.  No change in ROM in her wrists, some pain if lifting heavy things.  Ankle pain, along outside of ankles, no swelling, no problems walking or standing, or with stairs          Today's PHQ-2 Score:   PHQ-2 ( 1999 Pfizer) 7/3/2022   Q1: Little interest or pleasure in doing things 1   Q2: Feeling down, depressed or hopeless 1   PHQ-2 Score 2   PHQ-2 Total Score (12-17 Years)- Positive if 3 or more points; Administer PHQ-A if positive -   Q1: Little interest or pleasure in doing things Several days   Q2: Feeling down, depressed or hopeless Several days   PHQ-2 Score 2       Abuse: Current or Past (Physical, Sexual or Emotional) - NO  Do you feel safe in your environment? YES    Have you ever done Advance Care Planning? (For example, a Health Directive, POLST, or a discussion with a medical provider or your loved ones about your wishes): patient is currently filling out the forms.      Social History     Tobacco Use     Smoking status:  Never Smoker     Smokeless tobacco: Never Used   Substance Use Topics     Alcohol use: Not Currently     Comment: I drink very little at this point. One drink every two month     If you drink alcohol do you typically have >3 drinks per day or >7 drinks per week? No    Alcohol Use 7/3/2022   Prescreen: >3 drinks/day or >7 drinks/week? No   Prescreen: >3 drinks/day or >7 drinks/week? -   No flowsheet data found.    Reviewed orders with patient.  Reviewed health maintenance and updated orders accordingly - Yes  BP Readings from Last 3 Encounters:   07/06/22 114/78   03/05/22 123/83   02/13/22 110/70    Wt Readings from Last 3 Encounters:   07/06/22 101.3 kg (223 lb 6.4 oz)   02/13/22 100.9 kg (222 lb 6.4 oz)   03/04/20 90.7 kg (200 lb)                    Breast Cancer Screening:    FHS-7:   Breast CA Risk Assessment (FHS-7) 3/25/2022 7/3/2022   Did any of your first-degree relatives have breast or ovarian cancer? No No   Did any of your relatives have bilateral breast cancer? No No   Did any man in your family have breast cancer? No No   Did any woman in your family have breast and ovarian cancer? No No   Did any woman in your family have breast cancer before age 50 y? No No   Do you have 2 or more relatives with breast and/or ovarian cancer? No No   Do you have 2 or more relatives with breast and/or bowel cancer? Yes No       Mammogram Screening: Recommended annual mammography  Pertinent mammograms are reviewed under the imaging tab.    History of abnormal Pap smear: NO - age 30-65 PAP every 5 years with negative HPV co-testing recommended  PAP / HPV Latest Ref Rng & Units 12/4/2019 5/1/2018 3/15/2016   PAP (Historical) - NIL NIL NIL   HPV16 NEG:Negative Negative Negative Negative   HPV18 NEG:Negative Negative Negative Negative   HRHPV NEG:Negative Negative Negative Negative     Reviewed and updated as needed this visit by clinical staff   Tobacco  Allergies  Meds  Problems  Med Hx  Surg Hx  Fam Hx  Soc   Hx  "         Reviewed and updated as needed this visit by Provider                       Review of Systems   Constitutional: Negative for chills and fever.   HENT: Negative for congestion, ear pain, hearing loss and sore throat.    Eyes: Positive for visual disturbance. Negative for pain.   Respiratory: Negative for cough and shortness of breath.    Cardiovascular: Negative for chest pain, palpitations and peripheral edema.   Gastrointestinal: Positive for heartburn. Negative for abdominal pain, constipation, diarrhea, hematochezia and nausea.   Breasts:  Negative for tenderness, breast mass and discharge.   Genitourinary: Negative for dysuria, frequency, genital sores, hematuria, pelvic pain, urgency, vaginal bleeding and vaginal discharge.   Musculoskeletal: Positive for arthralgias, joint swelling and myalgias.   Skin: Negative for rash.   Neurological: Negative for dizziness, weakness, headaches and paresthesias.   Psychiatric/Behavioral: Positive for mood changes. The patient is nervous/anxious.      Has her cervix     OBJECTIVE:   /78 (BP Location: Right arm, Patient Position: Sitting, Cuff Size: Adult Large)   Pulse 80   Temp 97.9  F (36.6  C) (Oral)   Resp 16   Ht 1.664 m (5' 5.5\")   Wt 101.3 kg (223 lb 6.4 oz)   LMP 09/26/2012   SpO2 97%   BMI 36.61 kg/m    Physical Exam  GENERAL: healthy, alert and no distress  EYES: Eyes grossly normal to inspection, PERRL and conjunctivae and sclerae normal  HENT: ear canals and TM's normal, nose and mouth without ulcers or lesions  NECK: no adenopathy, no asymmetry, masses, or scars and thyroid normal to palpation  RESP: lungs clear to auscultation - no rales, rhonchi or wheezes  BREAST: normal without masses, tenderness or nipple discharge and no palpable axillary masses or adenopathy  CV: regular rate and rhythm, normal S1 S2, no S3 or S4, no murmur, click or rub, no peripheral edema and peripheral pulses strong  ABDOMEN: soft, nontender, no " hepatosplenomegaly, no masses and bowel sounds normal   (female): normal female external genitalia, normal urethral meatus, vaginal mucosa pink, moist, well rugated, and normal cervix/adnexa/ without masses or discharge, no uterus  MS: no gross musculoskeletal defects noted, no edema  Normal ROM of hands,  strength and wrists ROM is normal, no swelling noted in hands or ankles  SKIN: no suspicious lesions or rashes  NEURO: Normal strength and tone, mentation intact and speech normal  PSYCH: mentation appears normal, affect normal/bright    Diagnostic Test Results:  Labs reviewed in Epic    ASSESSMENT/PLAN:   (Z00.00) Routine general medical examination at a health care facility  (primary encounter diagnosis)  Comment: normal  Plan: reviewed labs    (Z12.11) Screen for colon cancer  Comment: Discussed all options with patient, pros and cons. Patient in agreement with this plan.    Plan: Colonscopy Screening  Referral            (Z12.4) Cervical cancer screening  Comment: done  Plan: Pap Screen with HPV - recommended age 30 - 65         years            (N89.8) Vaginal dryness  Comment: Discussed all options with patient, pros and cons. Patient in agreement with this plan.    Plan: estradiol (VAGIFEM) 10 MCG TABS vaginal tablet            (M79.7) Fibromyalgia  Comment: using medical cannabis  Plan: controlled    (R76.8) Elevated rheumatoid factor  Comment: reviewed, stable, but slightly high, low suspicion of RA, but will check additional labs  Plan: Cyclic Citrullinated Peptide Antibody IgG, CRP,        inflammation, ESR: Erythrocyte sedimentation         rate            (M25.50) Multiple joint pain  Comment:   Plan: Cyclic Citrullinated Peptide Antibody IgG, CRP,        inflammation, ESR: Erythrocyte sedimentation         rate              Patient has been advised of split billing requirements and indicates understanding: Yes    COUNSELING:  Reviewed preventive health counseling, as reflected in  "patient instructions       Regular exercise       Healthy diet/nutrition    Estimated body mass index is 36.61 kg/m  as calculated from the following:    Height as of this encounter: 1.664 m (5' 5.5\").    Weight as of this encounter: 101.3 kg (223 lb 6.4 oz).    Weight management plan: Discussed healthy diet and exercise guidelines    She reports that she has never smoked. She has never used smokeless tobacco.      Counseling Resources:  ATP IV Guidelines  Pooled Cohorts Equation Calculator  Breast Cancer Risk Calculator  BRCA-Related Cancer Risk Assessment: FHS-7 Tool  FRAX Risk Assessment  ICSI Preventive Guidelines  Dietary Guidelines for Americans, 2010  USDA's MyPlate  ASA Prophylaxis  Lung CA Screening    Carrie Prasad MD  [facility]  "

## 2022-07-07 LAB — CCP AB SER IA-ACNC: 0.9 U/ML

## 2022-07-09 LAB
BKR LAB AP GYN ADEQUACY: NORMAL
BKR LAB AP GYN INTERPRETATION: NORMAL
BKR LAB AP HPV REFLEX: NORMAL
BKR LAB AP PREVIOUS ABNORMAL: NORMAL
PATH REPORT.COMMENTS IMP SPEC: NORMAL
PATH REPORT.COMMENTS IMP SPEC: NORMAL
PATH REPORT.RELEVANT HX SPEC: NORMAL

## 2022-07-12 LAB
HUMAN PAPILLOMA VIRUS 16 DNA: NEGATIVE
HUMAN PAPILLOMA VIRUS 18 DNA: NEGATIVE
HUMAN PAPILLOMA VIRUS FINAL DIAGNOSIS: NORMAL
HUMAN PAPILLOMA VIRUS OTHER HR: NEGATIVE

## 2022-09-13 ENCOUNTER — HOSPITAL ENCOUNTER (OUTPATIENT)
Facility: CLINIC | Age: 47
End: 2022-09-13
Attending: INTERNAL MEDICINE | Admitting: INTERNAL MEDICINE
Payer: COMMERCIAL

## 2022-09-13 ENCOUNTER — TELEPHONE (OUTPATIENT)
Dept: GASTROENTEROLOGY | Facility: CLINIC | Age: 47
End: 2022-09-13

## 2022-09-13 DIAGNOSIS — Z12.11 SPECIAL SCREENING FOR MALIGNANT NEOPLASMS, COLON: Primary | ICD-10-CM

## 2022-09-13 NOTE — CONFIDENTIAL NOTE
Screening Questions    BlueKIND OF PREP RedLOCATION [review exclusion criteria] GreenSEDATION TYPE      1. Are you active on mychart? Yes     2. What insurance is in the chart? Preferred one     3.   Ordering/Referring Provider: Osmar    4. BMI   (If greater than 40 review exclusion criteria [PAC APPT IF [MAC] @ U)  35.8  [If yes, BMI OVER 40-EXTENDED PREP]      **(Sedation review/consideration needed)**  Do you have a legal guardian or Medical Power of    and/or are you able to give consent for your medical care?     No     5. Have you had a positive covid test in the last 90 days?   No  -     6.  Are you currently on dialysis?   No  [ If yes, G-PREP & HOSPITAL setting ONLY]     7.  Do you have chronic kidney disease?  No  [ If yes, G-PREP ]    8.   Do you have a diagnosis of diabetes?   No    [ If yes, G-PREP ]    9.  On a regular basis do you go 3-5 days between bowel movements?   No    [ If yes, EXTENDED PREP]    10.  Are you taking any prescription pain medications on a routine schedule?    No -  [ If yes, EXTENDED PREP] [If yes, MAC]      11.   Do you have any chemical dependencies such as alcohol, street drugs, or methadone?    No  [If yes, MAC]    12.   Do you have any history of post-traumatic stress syndrome, severe anxiety or history of psychosis?    No   [If yes, MAC]    13.  [FEMALES] Are you currently pregnant?     If yes, how many weeks?       Respiratory/Heart Screening:  [If yes to any of the following HOSPITAL setting only]     14. Do you have Pulmonary Hypertension [Lungs]?   No        15. Do you have UNCONTROLLED asthma?   No      16.  Do you use daily home oxygen?  No       17. Do you have mod to severe Obstructive Sleep Apnea?         (OKAY @  [facility]  SH  PH  RI  MG - if pt is not on OXYGEN)  No       18.   Have you had a heart or lung transplant?   No       19.   Have you had a stroke or Transient ischemic attack (TIA - aka  mini stroke ) within 6 months?  (If yes, please review  exclusion criteria)  No      20.   In the past 6 months, have you had any heart related issues including cardiomyopathy or heart attack?   No            If yes, did it require cardiac stenting or other implantable device?         21.   Do you have any implantable devices in your body (pacemaker, defib, LVAD)? (If yes, please review exclusion criteria)  No    22.  Do you take the medication Phentermine?  NO        23. Do you take nitroglycerin?   No            If yes, how often?   (if yes, HOSPITAL setting ONLY)    24.  Are you currently taking any blood thinners?    [If yes, INFORM patient to follw  w/ ORDERING PROVIDER FOR BRIDGING INSTRUCTIONS]     No     25.   Do you transfer independently?                (If NO, please HOSPITAL setting ONLY)  Yes     26.   Preferred LOCAL Pharmacy for Pre Prescription:      [facility] [place] [place] [facility] [address]    Scheduling Details  (Please ask for phone number if not scheduled by patient)      Caller : Oly Rush  Date of Procedure: 10/5/2022  Surgeon: Liliana  Location:         Sedation Type: CS l per screening questions  Conscious Sedation- Needs  for 6 hours after the procedure  MAC/General-Needs  for 24 hours after procedure    No  :[Pre-op Required] at [facility]  SH  MG and OR for MAC sedation   (advise patient they will need a pre-op WITH IN 30 DAYS of procedure date)     Type of Procedure Scheduled:   Lower Endoscopy [Colonoscopy]    Which Colonoscopy Prep was Sent?:   Maryjane  Due to recall     [facility] PATIENTS & [facility] PATIENTS NEEDS EXTENDED PREP       Informed patient they will need an adult  yes   Cannot take any type of public or medical transportation alone    Pre-Procedure Covid test to be completed at [facility] or Externally: yes-home test  **INFORMED OF HOME TESTING & LAB OPTION**        Confirmed Nurse will call to complete assessment yes     Additional comments: no

## 2022-09-26 RX ORDER — BISACODYL 5 MG
TABLET, DELAYED RELEASE (ENTERIC COATED) ORAL
Qty: 4 TABLET | Refills: 0 | Status: SHIPPED | OUTPATIENT
Start: 2022-09-26 | End: 2022-09-30 | Stop reason: HOSPADM

## 2022-12-01 ENCOUNTER — E-VISIT (OUTPATIENT)
Dept: FAMILY MEDICINE | Facility: CLINIC | Age: 47
End: 2022-12-01
Payer: COMMERCIAL

## 2022-12-01 DIAGNOSIS — Z53.9 ERRONEOUS ENCOUNTER--DISREGARD: Primary | ICD-10-CM

## 2022-12-02 ENCOUNTER — MYC MEDICAL ADVICE (OUTPATIENT)
Dept: FAMILY MEDICINE | Facility: CLINIC | Age: 47
End: 2022-12-02

## 2022-12-02 NOTE — TELEPHONE ENCOUNTER
Please see [email or web address] / [employer] message in reference to medical cannabis. Routed to PCP, Please review and advise.     Thank you,    Riky Mcclellan RN

## 2022-12-02 NOTE — TELEPHONE ENCOUNTER
Patient calling regarding below message.  Discussed I am not aware of what other providers do this.  Advised [facility] providers do not do this.  Advised to let RM respond to message as if Dr. Prasad does this they would likely know who else she could see for this.  Meaghan Bardales RN

## 2022-12-07 NOTE — TELEPHONE ENCOUNTER
TC's please schedule with a provider at  that dose this.  I know Duane Prasad, and Eveline do this and perhaps others?     Karma De Oliveira CNP

## 2022-12-21 ENCOUNTER — VIRTUAL VISIT (OUTPATIENT)
Dept: FAMILY MEDICINE | Facility: CLINIC | Age: 47
End: 2022-12-21
Payer: COMMERCIAL

## 2022-12-21 DIAGNOSIS — M79.7 FIBROMYALGIA: Primary | ICD-10-CM

## 2022-12-21 DIAGNOSIS — M25.50 MULTIPLE JOINT PAIN: ICD-10-CM

## 2022-12-21 DIAGNOSIS — Z12.11 SCREEN FOR COLON CANCER: ICD-10-CM

## 2022-12-21 PROCEDURE — 99214 OFFICE O/P EST MOD 30 MIN: CPT | Mod: 95 | Performed by: PHYSICIAN ASSISTANT

## 2022-12-21 NOTE — PROGRESS NOTES
Manasa is a 47 year old who is being evaluated via a billable video visit.      How would you like to obtain your AVS? MyChart  If the video visit is dropped, the invitation should be resent by: Text to cell phone: [phone]  Will anyone else be joining your video visit? No        Assessment & Plan     Fibromyalgia  Ongoing- tolerable but still with discomfort.  Using medical marijuana with very good results.  Will re-certify patient on MHD.  Follow up prn.    Multiple joint pain         Nicotine/Tobacco Cessation:  She reports that she has been smoking other. She has never used smokeless tobacco.  Nicotine/Tobacco Cessation Plan:   NA      Work on weight loss  Regular exercise    No follow-ups on file.    Jarret Zepeda PA-C  [facility]    Rin Goel is a 47 year old, presenting for the following health issues:  Recheck Medication (Medical marijuana recertification)      History of Present Illness       Reason for visit:  Medicine renewal    She eats 2-3 servings of fruits and vegetables daily.She consumes 1 sweetened beverage(s) daily.She exercises with enough effort to increase her heart rate 10 to 19 minutes per day.  She exercises with enough effort to increase her heart rate 3 or less days per week.   She is taking medications regularly.       Certification ends at end 2022  Using medical cannabis going on 4-5 years  Uses before bed  Has been life changing for pain levels  Thinks the cold weather makes things worse  Using flower form- usually every other night.  Seems like she found the right balance    Pain History:  When did you first notice your pain? - Chronic Pain   Have you seen this provider for your pain in the past?   Yes   Where in your body do you have pain? Arms, legs, shoulders  Are you seeing anyone else for your pain? No      PEG Score 12/21/2022   PEG Total Score 5.33       Chronic Pain Follow Up:    Location of pain: arms, legs  Analgesia/pain control:    -  Recent changes:  no    - Overall control: Tolerable with discomfort    - Current treatments: medical marijuana    Adherence:     - Do you ever take more pain medicine than prescribed? No     PDMP Review     None        Last CSA Agreement:   CSA -- Patient Level:    CSA: None found at the patient level.       Last UDS:         Review of Systems   Constitutional, HEENT, cardiovascular, pulmonary, gi and gu systems are negative, except as otherwise noted.      Objective           Vitals:  No vitals were obtained today due to virtual visit.    Physical Exam   GENERAL: Healthy, alert and no distress  EYES: Eyes grossly normal to inspection.  No discharge or erythema, or obvious scleral/conjunctival abnormalities.  RESP: No audible wheeze, cough, or visible cyanosis.  No visible retractions or increased work of breathing.    SKIN: Visible skin clear. No significant rash, abnormal pigmentation or lesions.  NEURO: Cranial nerves grossly intact.  Mentation and speech appropriate for age.  PSYCH: Mentation appears normal, affect normal/bright, judgement and insight intact, normal speech and appearance well-groomed.            Video-Visit Details    Type of service:  Video Visit     Originating Location (pt. Location): Home  Distant Location (provider location):  Off-site  Platform used for Video Visit: [employer]

## 2022-12-26 ENCOUNTER — HEALTH MAINTENANCE LETTER (OUTPATIENT)
Age: 47
End: 2022-12-26

## 2022-12-29 ENCOUNTER — OFFICE VISIT (OUTPATIENT)
Dept: PODIATRY | Facility: CLINIC | Age: 47
End: 2022-12-29
Payer: COMMERCIAL

## 2022-12-29 VITALS — WEIGHT: 223 LBS | DIASTOLIC BLOOD PRESSURE: 80 MMHG | BODY MASS INDEX: 36.54 KG/M2 | SYSTOLIC BLOOD PRESSURE: 118 MMHG

## 2022-12-29 DIAGNOSIS — M21.42 PES PLANUS OF BOTH FEET: ICD-10-CM

## 2022-12-29 DIAGNOSIS — M72.2 PLANTAR FASCIAL FIBROMATOSIS: ICD-10-CM

## 2022-12-29 DIAGNOSIS — M79.672 LEFT FOOT PAIN: Primary | ICD-10-CM

## 2022-12-29 DIAGNOSIS — M21.41 PES PLANUS OF BOTH FEET: ICD-10-CM

## 2022-12-29 PROCEDURE — 99203 OFFICE O/P NEW LOW 30 MIN: CPT | Performed by: PODIATRIST

## 2022-12-29 RX ORDER — DEXAMETHASONE SODIUM PHOSPHATE 4 MG/ML
4 INJECTION, SOLUTION INTRA-ARTICULAR; INTRALESIONAL; INTRAMUSCULAR; INTRAVENOUS; SOFT TISSUE SEE ADMIN INSTRUCTIONS
Qty: 30 ML | Refills: 0 | Status: SHIPPED | OUTPATIENT
Start: 2022-12-29 | End: 2023-08-18

## 2022-12-29 NOTE — PATIENT INSTRUCTIONS
Thank you for choosing [facility] Podiatry / Foot & Ankle Surgery!    DR ROSAS'S CLINIC:  [facility]   [address]   [place] [address]      TRIAGE LINE: [phone]  APPOINTMENTS: [phone]  RADIOLOGY: [phone]  SET UP SURGERY: [phone]  FAX NUMBER: [phone]  BILLING QUESTIONS: [phone]       Follow up: 4-6 weeks if not improved for MRI    Birkenstocks sandals around the house      PLANTAR FIBROMA  A plantar fibroma is a fibrous knot (nodule) in the arch of the foot. It is embedded within the plantar fascia, a band of tissue that extends from the heel to the toes on the bottom of the foot. A plantar fibroma can develop in one or both feet, is benign (non-malignant), and usually will not go away or get smaller without treatment. Definitive causes for this condition have not been clearly identified.    SIGNS & SYMPTOMS  The characteristic sign of a plantar fibroma is a noticeable lump in the arch that feels firm to the touch. This mass can remain the same size or get larger over time, or additional fibromas may develop.  People who have a plantar fibroma may or may not have pain. When pain does occur, it is often caused by shoes pushing against the lump in the arch, although it can also arise when walking or standing barefoot.    DIAGNOSIS  To diagnose a plantar fibroma, the foot and ankle surgeon will examine the foot and press on the affected area. Sometimes this can produce pain that extends down to the toes. An MRI or biopsy may be performed to further evaluate the lump and aid in diagnosis.    TREATMENT OPTIONS   Non-surgical treatment may help relieve the pain of a plantar fibroma, although it will not make the mass disappear. The foot and ankle surgeon may select one or more of the following non-surgical options:     Steroid injections. Injecting corticosteroid medication into the mass may help  shrink it and thereby relieve the pain that occurs when  walking. This reduction  may be only temporary and the fibroma could slowly return to its original size.      Orthotic devices. If the fibroma is stable, meaning it is not changing in size,  custom orthotic devices (shoe inserts) may relieve the pain by distributing the  patient s weight away from the fibroma.      Physical therapy. The pain is sometimes treated through physical therapy  methods that deliver anti-inflammatory medication into the fibroma without the  need for injection.  Cross friction massage.     Verapamil Cream: Applying 10% or higher verapamil cream can help to decrease  size.     Surgery: If the mass increases in size or pain, the patient should be further  evaluated. Surgical treatment to remove the fibroma is considered if the patient  continues to experience pain following non-surgical approaches. Surgical  removal of a plantar fibroma may result in a flattening of the arch or  development of hammertoes. Orthotic devices may be prescribed to provide  support to the foot. Due to the high incidence of recurrence with this condition,  continued follow-up with the foot and ankle surgeon is recommended.

## 2022-12-29 NOTE — PROGRESS NOTES
PATIENT HISTORY:   Oly Rush is a 47 year old female who presents to clinic for pain in the bottom of the arch of the left foot.  States its been going on for about 6 months.  Very tender to touch.  Will be an aching sometimes sharp pain with pressure.  Can be 6 out of 10 at its worst.  Worse with walking or standing or in certain shoes and hard floors.  She is tried topical creams and stretches and massage but it has not gone away.  She is wondering what is causing this and what can be done to get rid of it.    Review of Systems:  Patient denies fever, chills, rash, wound, stiffness, numbness, weakness, heart burn, blood in stool, chest pain with activity, calf pain when walking, shortness of breath with activity, chronic cough, easy bleeding/bruising, swelling of ankles, excessive thirst, fatigue, depression, anxiety.  Patient admits to limping at times.     PAST MEDICAL HISTORY:   Past Medical History:   Diagnosis Date     Abnormal Pap smear     cone bx     Anxiety      Arthritis      Cancer (H)         PAST SURGICAL HISTORY:   Past Surgical History:   Procedure Laterality Date     C/SECTION, LOW TRANSVERSE      , Low Transverse     C/SECTION, LOW TRANSVERSE  2004    , Low Transverse     DAVINCI HYSTERECTOMY TOTAL, BILATERAL SALPINGO-OOPHORECTOMY, COMBINED  10/2/2012    Procedure: COMBINED DAVINCI HYSTERECTOMY TOTAL, SALPINGO-OOPHORECTOMY;   DAVINCI  Supra cervical HYSTERECTOMY TOTAL, Bilateral Salpingectomies with Left Oophorectomy and Cystoscopy;  Surgeon: Manasa Santos DO;  Location:  OR     [facility] NONSPECIFIC PROCEDURE      Cone Biopsy of cervix        MEDICATIONS:   Current Outpatient Medications:      ascorbic acid (VITAMIN C) 1000 MG TABS, Take 1 tablet (1,000 mg) by mouth daily, Disp: 30 tablet, Rfl:      cholecalciferol (VITAMIN D3) 5000 UNITS TABS tablet, Take by mouth daily, Disp: , Rfl:      estradiol (VAGIFEM) 10 MCG TABS vaginal tablet, Place 1 tablet (10 mcg)  vaginally twice a week, Disp: 12 tablet, Rfl: 3     Ginger, Zingiber officinalis, (GINGER PO), , Disp: , Rfl:      HM OMEGA-3-6-9 FATTY ACIDS PO, , Disp: , Rfl:      medical cannabis (Patient's own supply), See Admin Instructions (The purpose of this order is to document that the patient reports taking medical cannabis.  This is not a prescription, and is not used to certify that the patient has a qualifying medical condition.), Disp: , Rfl:      Probiotic Product (ACIDOPHILUS PROBIOTIC BLEND) CAPS, , Disp: , Rfl:      vitamin B complex with vitamin C (STRESS TAB) tablet, Take 1 tablet by mouth daily, Disp: , Rfl: 0     ondansetron (ZOFRAN) 4 MG tablet, Take 1 tablet (4 mg) by mouth every 8 hours as needed for nausea (Patient not taking: Reported on 12/21/2022), Disp: 10 tablet, Rfl: 3     ALLERGIES:    Allergies   Allergen Reactions     Flagyl [Metronidazole] Other (See Comments)     paresthesias        SOCIAL HISTORY:   Social History     Socioeconomic History     Marital status:      Spouse name: Not on file     Number of children: 2     Years of education: Not on file     Highest education level: Not on file   Occupational History     Employer: [employer]   Tobacco Use     Smoking status: Some Days     Types: Other     Smokeless tobacco: Never     Tobacco comments:     Medical marijuana   Vaping Use     Vaping Use: Never used   Substance and Sexual Activity     Alcohol use: Not Currently     Comment: I drink very little at this point. One drink every two month     Drug use: No     Sexual activity: Yes     Partners: Female   Other Topics Concern     Parent/sibling w/ CABG, MI or angioplasty before 65F 55M? No      Service Not Asked     Blood Transfusions Not Asked     Caffeine Concern Not Asked     Occupational Exposure Not Asked     Hobby Hazards Not Asked     Sleep Concern Not Asked     Stress Concern Yes     Weight Concern Not Asked     Special Diet Not Asked     Back Care Not  Asked     Exercise Yes     Bike Helmet Not Asked     Seat Belt Yes     Self-Exams Not Asked   Social History Narrative     Not on file     Social Determinants of Health     Financial Resource Strain: Not on file   Food Insecurity: Not on file   Transportation Needs: Not on file   Physical Activity: Not on file   Stress: Not on file   Social Connections: Not on file   Intimate Partner Violence: Not on file   Housing Stability: Not on file        FAMILY HISTORY:   Family History   Problem Relation Age of Onset     Ovarian Cancer Maternal Aunt      Other Cancer Maternal Aunt         larynx cancer     C.A.D. No family hx of      Diabetes No family hx of      Hypertension No family hx of      Breast Cancer No family hx of      Cancer - colorectal No family hx of         EXAM:Vitals: /80   Wt 101.2 kg (223 lb)   LMP 09/26/2012   BMI 36.54 kg/m    BMI= Body mass index is 36.54 kg/m .    General appearance: Patient is alert and fully cooperative with history & exam.  No sign of distress is noted during the visit.     Psychiatric: Affect is pleasant & appropriate.  Patient appears motivated to improve health.     Respiratory: Breathing is regular & unlabored while sitting.     HEENT: Hearing is intact to spoken word.  Speech is clear.  No gross evidence of visual impairment that would impact ambulation.     Dermatologic: Skin is intact to both lower extremities without significant lesions, rash or abrasion.  No paronychia or evidence of soft tissue infection is noted.     Vascular: DP & PT pulses are intact & regular bilaterally.  No significant edema or varicosities noted.  CFT and skin temperature is normal to both lower extremities.     Neurologic: Lower extremity sensation is intact to light touch.  No evidence of weakness or contracture in the lower extremities.  No evidence of neuropathy.     Musculoskeletal: Patient is ambulatory without assistive device or brace.  Firm and mobile mass to the plantar medial  aspect of the left arch.  Minimal pain on palpation.  Decreased arch height.       ASSESSMENT:    Left foot pain  Pes planus of both feet  Plantar fascial fibromatosis       Medical Decision Making/Plan:  Reviewed patient's chart in epic.  We also talked about causes of plantar fibromas.  They are fibrous masses in the plantar fascia that are benign.  Discussed treatment such as padding, shoe gear, injections, physical therapy.  We also discussed that sometimes these require surgical removal.  Normally this is a last resort.  Risks of surgery including high recurrence rate, continued pain, painful scarring.     At this time agreed to try physical therapy with iontophoresis to see if we can get this to calm down and possibly shrink a little.  Recommend that she not go barefoot or in socks and have a supportive sandal or shoe on at all times.  We will have her continue the stretches and using the topical pain cream.  If pain does not improve in 4 to 6 weeks recommend an MRI for to assess and possible surgery to remove the fibroma.  We did discuss that this would be a same-day surgery except she would not be weightbearing on the foot for 4 weeks.  She would like to avoid surgery if possible.  All questions were answered to patient satisfaction and she will call for the questions or concerns.    Patient risk factor: Patient is at low risk for infection.        Amrita Underwood DPM, Podiatry/Foot and Ankle Surgery

## 2023-01-12 ENCOUNTER — THERAPY VISIT (OUTPATIENT)
Dept: PHYSICAL THERAPY | Facility: CLINIC | Age: 48
End: 2023-01-12
Attending: PODIATRIST
Payer: COMMERCIAL

## 2023-01-12 DIAGNOSIS — M21.42 PES PLANUS OF BOTH FEET: ICD-10-CM

## 2023-01-12 DIAGNOSIS — M72.2 PLANTAR FASCIAL FIBROMATOSIS: ICD-10-CM

## 2023-01-12 DIAGNOSIS — M21.41 PES PLANUS OF BOTH FEET: ICD-10-CM

## 2023-01-12 DIAGNOSIS — M79.672 LEFT FOOT PAIN: ICD-10-CM

## 2023-01-12 PROCEDURE — 97110 THERAPEUTIC EXERCISES: CPT | Mod: GP | Performed by: PHYSICAL THERAPIST

## 2023-01-12 PROCEDURE — 97161 PT EVAL LOW COMPLEX 20 MIN: CPT | Mod: GP | Performed by: PHYSICAL THERAPIST

## 2023-01-12 NOTE — PROGRESS NOTES
[facility] Initial Evaluation     Present: no    Subjective:  Oly Rush is a 47 year old female with complaints of moe foot/ankle . Pt reports that she's been having pain in the left foot/lump in this region for 6 months now without known cause. Worse with walking, standing, and weight bearing. Denies vague symptoms. Uses a tennis ball at times for the pain. Wants to get rid of this pain altogether, increase activity level pain free.      Symptoms commenced as a result of: chronic. Condition occurred in the following environment: unsure. Onset of symptoms: 6+ months. Location of symptoms: medial arch left foot. Pain level on number scale: 3/10. Quality of pain: aching, burning. Associated symptoms: none. Pain frequency (constant/intermittent): intermittent. Symptoms are exacerbated by: standing, walking, certain positions. Symptoms are relieved by: ball rolling, ice. Progression of symptoms since onset: same. Imaging: none. Previous treatment: none. Response to previous treatment: none. General health as reported by patient is fair. Pertinent medical history includes: See Epic. Medical allergies: see Epic. Other pertinent surgeries: see Epic. Current medications: See Epic. Occupation: works. Work/restriction status: none. Primary job tasks: from home, sitting. Barriers at home/work: None reported by patient. Red flags: None reported by patient.    Objective  Gait:wide ANA, otherwise unremarkable    Ankle/Foot Alignment: flat fallen arches moe, pronated feet moe    Screening: negative    Flexibility: gastroc/soleus tight moe    Ankle AROM/PROM (* = pain) Right Left   DF full Slightly limited   PF full full   INV full full   EV full full   Great Toe EXT full Slightly limited   Great Toe FL full full       Ankle Strength (* = pain) Right Left   DF 5/5 5/5   PF 5/5 4/5*   INV 5/5 5-/5   EV 5/5 4+/5     Special Tests Right Left   Anterior Drawer (ATF) - -   Posterior Drawer (PTF)  - -   Varus Stress (Calc Fib) - -   Valgus Stress (Deltoid) - -   External Rotation Stress - -     Palpation Tenderness:tender left plantar fascia with nodule left foot    Swelling: unremarkable    Functional Squat/Balance: fair squat, fair SLS right and poor SLS left    Key Findings  Chronic left foot pain/plantar fasciitis with nodule.   Assessment/Plan:    Patient is a 47 year old female with both sides ankle and feet complaints.    Patient has the following significant findings with corresponding treatment plan.                Diagnosis 1:  Left foot/ankle pain, plantar fascitiis left foot  Pain -  manual therapy, self management, education and home program  Decreased ROM/flexibility - manual therapy and therapeutic exercise  Decreased joint mobility - manual therapy and therapeutic exercise  Decreased strength - therapeutic exercise and therapeutic activities  Impaired balance - neuro re-education and therapeutic activities  Impaired gait - gait training  Impaired muscle performance - neuro re-education  Decreased function - therapeutic activities    Therapy Evaluation Codes:     1) History comprised of:   Personal factors that impact the plan of care:      Time since onset of symptoms.    Comorbidity factors that impact the plan of care are:      Fibromyalgia, Menopausal and Overweight.     Medications impacting care: None.  2) Examination of Body Systems comprised of:   Body structures and functions that impact the plan of care:      Ankle and foot/plantar fasic.   Activity limitations that impact the plan of care are:      Jumping, Lifting, Running, Sports, Squatting/kneeling, Stairs, Standing and Walking.  3) Clinical presentation characteristics are:   Stable/Uncomplicated.  4) Decision-Making    Low complexity using standardized patient assessment instrument and/or measureable assessment of functional outcome.    Cumulative Therapy Evaluation is: Low complexity.    Previous and current functional  limitations:  (See Goal Flow Sheet for this information)    Short term and Long term goals: (See Goal Flow Sheet for this information)     Communication ability:  Patient appears to be able to clearly communicate and understand verbal and written communication and follow directions correctly.  Treatment Explanation - The following has been discussed with the patient:   RX ordered/plan of care  Anticipated outcomes  Possible risks and side effects  This patient would benefit from PT intervention to resume normal activities.   Rehab potential is good.    Frequency:  1 X week, once daily  Duration:  for 10 weeks  Discharge Plan:  Achieve all LTG.  Independent in home treatment program.  Reach maximal therapeutic benefit.    Please refer to the daily flowsheet for treatment today, total treatment time and time spent performing 1:1 timed codes.     Inquires  Mino Siddiqi PT, DPT, CSCS  Physical Therapist  [facility] [address] [address] [phone]

## 2023-02-14 NOTE — PROGRESS NOTES
Patient seen for one time evaluation and treatment.  Patient did not return for further treatment and current status is unknown.  Please see initial evaluation for further information.    Inquires  Mino Siddiqi PT, DPT, [facility]  Physical Therapist  [facility] Physical [facility]  [id number] [place] [id number]  [phone]

## 2023-02-27 ENCOUNTER — VIRTUAL VISIT (OUTPATIENT)
Dept: FAMILY MEDICINE | Facility: CLINIC | Age: 48
End: 2023-02-27
Payer: COMMERCIAL

## 2023-02-27 DIAGNOSIS — J01.90 ACUTE SINUSITIS WITH SYMPTOMS > 10 DAYS: Primary | ICD-10-CM

## 2023-02-27 DIAGNOSIS — Z12.11 SCREEN FOR COLON CANCER: ICD-10-CM

## 2023-02-27 DIAGNOSIS — Z12.31 VISIT FOR SCREENING MAMMOGRAM: ICD-10-CM

## 2023-02-27 PROCEDURE — 99213 OFFICE O/P EST LOW 20 MIN: CPT | Mod: VID | Performed by: FAMILY MEDICINE

## 2023-02-27 ASSESSMENT — ENCOUNTER SYMPTOMS
RHINORRHEA: 1
FATIGUE: 1
WHEEZING: 1
COUGH: 1
GASTROINTESTINAL NEGATIVE: 1

## 2023-02-27 NOTE — PROGRESS NOTES
Manasa is a 47 year old who is being evaluated via a billable video visit.      How would you like to obtain your AVS? MyChart  If the video visit is dropped, the invitation should be resent by: Text to cell phone: [phone]  Will anyone else be joining your video visit? No        Assessment and Plan    (J01.90) Acute sinusitis with symptoms > 10 days  (primary encounter diagnosis)  Comment:   Plan: amoxicillin-clavulanate (AUGMENTIN) 875-125 MG         tablet            (Z12.11) Screen for colon cancer  Comment:   Plan: Colonoscopy Screening  Referral            (Z12.31) Visit for screening mammogram  Comment:   Plan: MA SCREENING DIGITAL BILAT - Future  (s+30)              RTC in  CPE    eCdric Mosquera MD      Subjective   Manasa is a 47 year old, presenting for the following health issues:  No chief complaint on file.      History of Present Illness       Reason for visit:  Cold symptoms that regressed over the weekend. Wheezing/whistling sounds woke me up and cough is non productive now  Symptom onset:  3-4 weeks ago  Symptoms include:  Cough,  chest pain, some drainage  Symptom intensity:  Moderate  Symptom progression:  Worsening  Had these symptoms before:  No    She eats 2-3 servings of fruits and vegetables daily.She consumes 1 sweetened beverage(s) daily.She exercises with enough effort to increase her heart rate 20 to 29 minutes per day.  She exercises with enough effort to increase her heart rate 3 or less days per week.   She is taking medications regularly.       Acute Illness  Acute illness concerns: Cough, URI  Onset/Duration: ongoing  Symptoms:  Fever: No  Chills/Sweats: No  Headache (location?): No  Sinus Pressure: No  Conjunctivitis:  No  Ear Pain: no  Rhinorrhea: YES  Congestion: YES  Sore Throat: No  Cough: YES-non-productive  Wheeze: YES      Initially thought she had some allergies, but has been feeling pretty persistently crummy.  Cough. Low energy, rhinorrhea.    Review of Systems    Constitutional: Positive for fatigue.   HENT: Positive for congestion and rhinorrhea.    Respiratory: Positive for cough and wheezing.    Gastrointestinal: Negative.             Objective           Vitals:  No vitals were obtained today due to virtual visit.    Physical Exam   GENERAL: Healthy, alert and no distress  EYES: Eyes grossly normal to inspection.  No discharge or erythema, or obvious scleral/conjunctival abnormalities.  RESP: No audible wheeze, cough, or visible cyanosis.  No visible retractions or increased work of breathing.    SKIN: Visible skin clear. No significant rash, abnormal pigmentation or lesions.  NEURO: Cranial nerves grossly intact.  Mentation and speech appropriate for age.  PSYCH: Mentation appears normal, affect normal/bright, judgement and insight intact, normal speech and appearance well-groomed.                Video-Visit Details    Type of service:  Video Visit     Originating Location (pt. Location): Home  Distant Location (provider location):  Off-site  Platform used for Video Visit: [employer]

## 2023-02-28 ENCOUNTER — MYC MEDICAL ADVICE (OUTPATIENT)
Dept: FAMILY MEDICINE | Facility: CLINIC | Age: 48
End: 2023-02-28
Payer: COMMERCIAL

## 2023-02-28 DIAGNOSIS — J01.90 ACUTE SINUSITIS WITH SYMPTOMS > 10 DAYS: Primary | ICD-10-CM

## 2023-02-28 RX ORDER — CLARITHROMYCIN 500 MG
500 TABLET ORAL 2 TIMES DAILY
Qty: 14 TABLET | Refills: 0 | Status: SHIPPED | OUTPATIENT
Start: 2023-02-28 | End: 2023-03-07

## 2023-06-02 ENCOUNTER — HEALTH MAINTENANCE LETTER (OUTPATIENT)
Age: 48
End: 2023-06-02

## 2023-06-06 ENCOUNTER — PATIENT OUTREACH (OUTPATIENT)
Dept: CARE COORDINATION | Facility: CLINIC | Age: 48
End: 2023-06-06
Payer: COMMERCIAL

## 2023-06-20 ENCOUNTER — PATIENT OUTREACH (OUTPATIENT)
Dept: CARE COORDINATION | Facility: CLINIC | Age: 48
End: 2023-06-20
Payer: COMMERCIAL

## 2023-06-21 NOTE — PATIENT INSTRUCTIONS
Thank you for coming to [facility] for your care. It is my goal to partner with you to help you reach your optimal state of health.     You were seen today for your fibromyalgia. As discussed during your visit these are the recommendations:  1. Physical Therapy at the [facility] focuses on 3 main things.    1. Exercise--consistency vs intensity with a home exercise program consisting of stretching, strengthening exercises, and aerobic activity.   2. Self-care is important--focusing on learning how to pace your activities, energy conservation, relaxation, stress management, mindfulness.   3. The last one is body awareness--which includes looking at posture and body mechanics. Focuses on education and things that you can do to take care of yourself and assist you with managing your pain.     2. Pain psychology can help reduce physical and psychosocial triggers or reinforcers of pain by adapting thoughts, feelings and behaviors to reduce symptoms and increase quality of life.  Evidence indicates that the practice of relaxation, meditation, and mindfulness techniques can significantly affect pain levels and overall well being.    3. If you are interested in trying the above therapies you can call and I can place the referrals.  ----------------------------------------------------------------  Clinic Number:  [phone]     Call with any questions about your care and for scheduling assistance.     Calls are returned Monday through Friday between 8 AM and 4:30 PM. We usually get back to you within 2 business days depending on the issue/request.    If we are prescribing your medications:    For opioid medication refills, call the clinic or send a [employer] message 7 days in advance.  Please include:    Name of requested medication    Name of the pharmacy.    For non-opioid medications, call your pharmacy directly to request a refill. Please allow 3-4 days to be processed.     Per  [place] State Law:    All controlled substance prescriptions must be filled within 30 days of being written.      For those controlled substances allowing refills, pickup must occur within 30 days of last fill.      We believe regular attendance is key to your success in our program!      Any time you are unable to keep your appointment we ask that you call us at least 24 hours in advance to cancel.This will allow us to offer the appointment time to another patient.     Multiple missed appointments may lead to dismissal from the clinic.       yes...

## 2023-08-18 ENCOUNTER — VIRTUAL VISIT (OUTPATIENT)
Dept: FAMILY MEDICINE | Facility: CLINIC | Age: 48
End: 2023-08-18
Payer: COMMERCIAL

## 2023-08-18 DIAGNOSIS — Z12.11 SCREEN FOR COLON CANCER: ICD-10-CM

## 2023-08-18 DIAGNOSIS — M79.7 FIBROMYALGIA: Primary | ICD-10-CM

## 2023-08-18 PROCEDURE — 99214 OFFICE O/P EST MOD 30 MIN: CPT | Mod: VID | Performed by: FAMILY MEDICINE

## 2023-08-18 NOTE — PROGRESS NOTES
Manasa is a 48 year old who is being evaluated via a billable video visit.      How would you like to obtain your AVS? MyChart  If the video visit is dropped, the invitation should be resent by: Text to cell phone: [phone]  Will anyone else be joining your video visit? No          Assessment & Plan     Fibromyalgia  Doing ok with current treatment, will be seeing therapist soon to discuss stress and self care    Screen for colon cancer  Will get scheduled and make her follow-up wellness visit  - Colonoscopy Screening  Referral; Future    Prescription drug management         Work on weight loss  Regular exercise    Carrie Prasad MD  [facility] AINSLEYMOSAUL Goel is a 48 year old, presenting for the following health issues:  Fibromyalgia        8/18/2023     9:36 AM   Additional Questions   Roomed by Erika HESTER CMA   Accompanied by LINDA         8/18/2023     9:36 AM   Patient Reported Additional Medications   Patient reports taking the following new medications None       History of Present Illness       Reason for visit:  Medocal cannabis renewal    She eats 2-3 servings of fruits and vegetables daily.She consumes 1 sweetened beverage(s) daily.She exercises with enough effort to increase her heart rate 20 to 29 minutes per day.  She exercises with enough effort to increase her heart rate 3 or less days per week.   She is taking medications regularly.       Mom is dx with alzheimers, living home in [place], that state has very limited resources.  Dad has nonhodgkins and treatment started last year, pet scan this week.middle sister lives in [place] and works 2 jobs, oldest sister lives in [place] and does fly home to help.      [place]    Medication Followup of Medicinal Cannabis   Taking Medication as prescribed: yes  Side Effects:  None  Medication Helping Symptoms:  yes    Certification needs updating, she likes to use a certain type of strain  Using  "medical cannabis going on 5 years  Uses before bed  Has been life changing for pain levels  Thinks the cold weather makes things worse and any change in season, so fall can be an issue, but sping is usually worse  Using flower form- usually every other night.  Seems like she found the right balance    Sinus infection with DrBender and told to follow up for colonoscopy  And due for her wellness visit.  Worried about her Fhx and her weight and over all health.  Her weight is up and her and Orly are both are trying to loose weight.  Learning limitations.     Has 2 grandkids now     Has to start taking care of herself, her and Orly are both working on that, always taking care of others          Review of Systems   Constitutional, HEENT, cardiovascular, pulmonary, gi and gu systems are negative, except as otherwise noted.      Objective    Vitals - Patient Reported  Weight (Patient Reported): 97.5 kg (215 lb)  Height (Patient Reported): 165.1 cm (5' 5\")  BMI (Based on Pt Reported Ht/Wt): 35.78  Pain Score: Moderate Pain (4)        Physical Exam   GENERAL: Healthy, alert and no distress  EYES: Eyes grossly normal to inspection.  No discharge or erythema, or obvious scleral/conjunctival abnormalities.  RESP: No audible wheeze, cough, or visible cyanosis.  No visible retractions or increased work of breathing.    SKIN: Visible skin clear. No significant rash, abnormal pigmentation or lesions.  NEURO: Cranial nerves grossly intact.  Mentation and speech appropriate for age.  PSYCH: Mentation appears normal, affect normal/bright, judgement and insight intact, normal speech and appearance well-groomed.                Video-Visit Details    Type of service:  Video Visit     Originating Location (pt. Location): Home    Distant Location (provider location):  Off-site  Platform used for Video Visit: Anabela      "

## 2023-09-17 ENCOUNTER — HEALTH MAINTENANCE LETTER (OUTPATIENT)
Age: 48
End: 2023-09-17

## 2023-10-18 ENCOUNTER — TELEPHONE (OUTPATIENT)
Dept: GASTROENTEROLOGY | Facility: CLINIC | Age: 48
End: 2023-10-18
Payer: COMMERCIAL

## 2023-10-18 NOTE — TELEPHONE ENCOUNTER
Pre Assessment RN Review    Focused Assessments    Pain Medication Review    Per scheduling questionnaire, patient reports taking prescription pain medication three or more times per week.    Per chart review, patient does not have an active prescription for an opioid medication. The Pt does use medical cannabis nightly for pain and to help with sleep. Advised a 24 hour hold before procedure. Pt can discuss pain/sleep alternatives with her doctor. MAC sedation needed.       Scheduling Status & Recommendations    Sedation: MAC/Deep Sedation - Per exclusion criteria.    Luzma Mirza RN   Endoscopy Procedure Pre Assessment RN

## 2023-10-18 NOTE — TELEPHONE ENCOUNTER
"Endoscopy Scheduling Screen    Have you had a positive Covid test in the last 14 days?  No    Are you active on MyChart?   Yes    What insurance is in the chart?  Other:  [employer]    Ordering/Referring Provider: Osmar   (If ordering provider performs procedure, schedule with ordering provider unless otherwise instructed. )    BMI: Estimated body mass index is 36.54 kg/m  as calculated from the following:    Height as of 7/6/22: 1.664 m (5' 5.5\").    Weight as of 12/29/22: 101.2 kg (223 lb).     Sedation Ordered  moderate sedation.   If patient BMI > 50 do not schedule in [facility].    If patient BMI > 45 do not schedule at [facility].    Are you taking methadone or Suboxone?  No    Are you taking any prescription medications for pain 3 or more times per week?   No-but does use medical cannabis daily at night for pain so she can sleep    Do you have a history of malignant hyperthermia or adverse reaction to anesthesia?  No    (Females) Are you currently pregnant?   No     Have you been diagnosed or told you have pulmonary hypertension?   No    Do you have an LVAD?  No    Have you been told you have moderate to severe sleep apnea?  No    Have you been told you have COPD, asthma, or any other lung disease?  No    Do you have any heart conditions?  No     Have you ever had an organ transplant?   No    Have you ever had or are you awaiting a heart or lung transplant?   No    Have you had a stroke or transient ischemic attack (TIA aka \"mini stroke\" in the last 6 months?   No    Have you been diagnosed with or been told you have cirrhosis of the liver?   No    Are you currently on dialysis?   No    Do you need assistance transferring?   No    BMI: Estimated body mass index is 36.54 kg/m  as calculated from the following:    Height as of 7/6/22: 1.664 m (5' 5.5\").    Weight as of 12/29/22: 101.2 kg (223 lb).     Is patients BMI > 40 and scheduling location [facility]?  No    Do you take an injectable medication for weight loss or diabetes " (excluding insulin)?  No    Do you take the medication Naltrexone?  No    Do you take blood thinners?  No       Prep   Are you currently on dialysis or do you have chronic kidney disease?  No    Do you have a diagnosis of diabetes?  No    Do you have a diagnosis of cystic fibrosis (CF)?  No    On a regular basis do you go 3 -5 days between bowel movements?  No    BMI > 40?  No    Preferred Pharmacy:    [place], [place] - [address] [facility]  [address] [facility] [address]  Phone: [phone] Fax: [phone]      Final Scheduling Details   Colonoscopy prep sent?  Standard MiraLAX    Procedure scheduled  Colonoscopy    Surgeon:  Queta     Date of procedure:  2/22/24     Pre-OP / PAC:   Yes - Patient informed of pre-op requirement.    Location  [facility] - Patient preference.    Sedation   MAC/Deep Sedation - Per RN assessment.      Patient Reminders:   You will receive a call from a Nurse to review instructions and health history.  This assessment must be completed prior to your procedure.  Failure to complete the Nurse assessment may result in the procedure being cancelled.      On the day of your procedure, please designate an adult(s) who can drive you home stay with you for the next 24 hours. The medicines used in the exam will make you sleepy. You will not be able to drive.      You cannot take public transportation, ride share services, or non-medical taxi service without a responsible caregiver.  Medical transport services are allowed with the requirement that a responsible caregiver will receive you at your destination.  We require that drivers and caregivers are confirmed prior to your procedure.

## 2023-10-31 ENCOUNTER — MYC MEDICAL ADVICE (OUTPATIENT)
Dept: FAMILY MEDICINE | Facility: CLINIC | Age: 48
End: 2023-10-31
Payer: COMMERCIAL

## 2024-01-30 RX ORDER — ONDANSETRON 2 MG/ML
4 INJECTION INTRAMUSCULAR; INTRAVENOUS
Status: CANCELLED | OUTPATIENT
Start: 2024-01-30

## 2024-01-30 RX ORDER — LIDOCAINE 40 MG/G
CREAM TOPICAL
Status: CANCELLED | OUTPATIENT
Start: 2024-01-30

## 2024-02-01 ENCOUNTER — APPOINTMENT (OUTPATIENT)
Dept: CT IMAGING | Facility: CLINIC | Age: 49
End: 2024-02-01
Attending: EMERGENCY MEDICINE
Payer: COMMERCIAL

## 2024-02-01 ENCOUNTER — HOSPITAL ENCOUNTER (EMERGENCY)
Facility: CLINIC | Age: 49
Discharge: HOME | End: 2024-02-01
Attending: EMERGENCY MEDICINE | Admitting: EMERGENCY MEDICINE
Payer: COMMERCIAL

## 2024-02-01 ENCOUNTER — APPOINTMENT (OUTPATIENT)
Dept: ULTRASOUND IMAGING | Facility: CLINIC | Age: 49
End: 2024-02-01
Attending: EMERGENCY MEDICINE
Payer: COMMERCIAL

## 2024-02-01 VITALS
BODY MASS INDEX: 36.32 KG/M2 | OXYGEN SATURATION: 99 % | SYSTOLIC BLOOD PRESSURE: 125 MMHG | RESPIRATION RATE: 18 BRPM | TEMPERATURE: 99 F | WEIGHT: 218 LBS | HEART RATE: 78 BPM | HEIGHT: 65 IN | DIASTOLIC BLOOD PRESSURE: 76 MMHG

## 2024-02-01 DIAGNOSIS — R10.13 EPIGASTRIC PAIN: ICD-10-CM

## 2024-02-01 LAB
ALBUMIN SERPL BCG-MCNC: 4.4 G/DL (ref 3.5–5.2)
ALP SERPL-CCNC: 115 U/L (ref 40–150)
ALT SERPL W P-5'-P-CCNC: 32 U/L (ref 0–50)
ANION GAP SERPL CALCULATED.3IONS-SCNC: 12 MMOL/L (ref 7–15)
AST SERPL W P-5'-P-CCNC: 27 U/L (ref 0–45)
ATRIAL RATE - MUSE: 76 BPM
BASOPHILS # BLD AUTO: 0.1 10E3/UL (ref 0–0.2)
BASOPHILS NFR BLD AUTO: 1 %
BILIRUB DIRECT SERPL-MCNC: <0.2 MG/DL (ref 0–0.3)
BILIRUB SERPL-MCNC: 0.6 MG/DL
BUN SERPL-MCNC: 8.9 MG/DL (ref 6–20)
CALCIUM SERPL-MCNC: 9.2 MG/DL (ref 8.6–10)
CHLORIDE SERPL-SCNC: 103 MMOL/L (ref 98–107)
CREAT SERPL-MCNC: 0.85 MG/DL (ref 0.51–0.95)
DEPRECATED HCO3 PLAS-SCNC: 25 MMOL/L (ref 22–29)
DIASTOLIC BLOOD PRESSURE - MUSE: NORMAL MMHG
EGFRCR SERPLBLD CKD-EPI 2021: 84 ML/MIN/1.73M2
EOSINOPHIL # BLD AUTO: 0.3 10E3/UL (ref 0–0.7)
EOSINOPHIL NFR BLD AUTO: 2 %
ERYTHROCYTE [DISTWIDTH] IN BLOOD BY AUTOMATED COUNT: 12.6 % (ref 10–15)
GLUCOSE BLDC GLUCOMTR-MCNC: 117 MG/DL (ref 70–99)
GLUCOSE SERPL-MCNC: 118 MG/DL (ref 70–99)
HCT VFR BLD AUTO: 45.6 % (ref 35–47)
HGB BLD-MCNC: 14.8 G/DL (ref 11.7–15.7)
HOLD SPECIMEN: NORMAL
HOLD SPECIMEN: NORMAL
IMM GRANULOCYTES # BLD: 0 10E3/UL
IMM GRANULOCYTES NFR BLD: 0 %
INTERPRETATION ECG - MUSE: NORMAL
LIPASE SERPL-CCNC: 28 U/L (ref 13–60)
LYMPHOCYTES # BLD AUTO: 3.2 10E3/UL (ref 0.8–5.3)
LYMPHOCYTES NFR BLD AUTO: 23 %
MCH RBC QN AUTO: 29.4 PG (ref 26.5–33)
MCHC RBC AUTO-ENTMCNC: 32.5 G/DL (ref 31.5–36.5)
MCV RBC AUTO: 91 FL (ref 78–100)
MONOCYTES # BLD AUTO: 0.7 10E3/UL (ref 0–1.3)
MONOCYTES NFR BLD AUTO: 5 %
NEUTROPHILS # BLD AUTO: 9.6 10E3/UL (ref 1.6–8.3)
NEUTROPHILS NFR BLD AUTO: 69 %
NRBC # BLD AUTO: 0 10E3/UL
NRBC BLD AUTO-RTO: 0 /100
P AXIS - MUSE: 57 DEGREES
PLATELET # BLD AUTO: 348 10E3/UL (ref 150–450)
POTASSIUM SERPL-SCNC: 4 MMOL/L (ref 3.4–5.3)
PR INTERVAL - MUSE: 192 MS
PROT SERPL-MCNC: 7.5 G/DL (ref 6.4–8.3)
QRS DURATION - MUSE: 84 MS
QT - MUSE: 380 MS
QTC - MUSE: 427 MS
R AXIS - MUSE: 9 DEGREES
RBC # BLD AUTO: 5.04 10E6/UL (ref 3.8–5.2)
SODIUM SERPL-SCNC: 140 MMOL/L (ref 135–145)
SYSTOLIC BLOOD PRESSURE - MUSE: NORMAL MMHG
T AXIS - MUSE: 39 DEGREES
TROPONIN T SERPL HS-MCNC: <6 NG/L
VENTRICULAR RATE- MUSE: 76 BPM
WBC # BLD AUTO: 13.9 10E3/UL (ref 4–11)

## 2024-02-01 PROCEDURE — 71260 CT THORAX DX C+: CPT

## 2024-02-01 PROCEDURE — 36415 COLL VENOUS BLD VENIPUNCTURE: CPT | Performed by: EMERGENCY MEDICINE

## 2024-02-01 PROCEDURE — 85025 COMPLETE CBC W/AUTO DIFF WBC: CPT | Performed by: EMERGENCY MEDICINE

## 2024-02-01 PROCEDURE — 250N000011 HC RX IP 250 OP 636: Performed by: EMERGENCY MEDICINE

## 2024-02-01 PROCEDURE — 250N000009 HC RX 250: Performed by: EMERGENCY MEDICINE

## 2024-02-01 PROCEDURE — 93005 ELECTROCARDIOGRAM TRACING: CPT

## 2024-02-01 PROCEDURE — 83690 ASSAY OF LIPASE: CPT | Performed by: EMERGENCY MEDICINE

## 2024-02-01 PROCEDURE — 82962 GLUCOSE BLOOD TEST: CPT

## 2024-02-01 PROCEDURE — 80053 COMPREHEN METABOLIC PANEL: CPT | Performed by: EMERGENCY MEDICINE

## 2024-02-01 PROCEDURE — 99285 EMERGENCY DEPT VISIT HI MDM: CPT | Mod: 25

## 2024-02-01 PROCEDURE — 250N000013 HC RX MED GY IP 250 OP 250 PS 637: Performed by: EMERGENCY MEDICINE

## 2024-02-01 PROCEDURE — 84484 ASSAY OF TROPONIN QUANT: CPT | Performed by: EMERGENCY MEDICINE

## 2024-02-01 PROCEDURE — 96374 THER/PROPH/DIAG INJ IV PUSH: CPT | Mod: 59

## 2024-02-01 PROCEDURE — 82248 BILIRUBIN DIRECT: CPT | Performed by: EMERGENCY MEDICINE

## 2024-02-01 PROCEDURE — 76705 ECHO EXAM OF ABDOMEN: CPT

## 2024-02-01 PROCEDURE — 96375 TX/PRO/DX INJ NEW DRUG ADDON: CPT

## 2024-02-01 RX ORDER — IOPAMIDOL 755 MG/ML
500 INJECTION, SOLUTION INTRAVASCULAR ONCE
Status: COMPLETED | OUTPATIENT
Start: 2024-02-01 | End: 2024-02-01

## 2024-02-01 RX ORDER — ONDANSETRON 2 MG/ML
4 INJECTION INTRAMUSCULAR; INTRAVENOUS
Status: DISCONTINUED | OUTPATIENT
Start: 2024-02-01 | End: 2024-02-01 | Stop reason: HOSPADM

## 2024-02-01 RX ORDER — MORPHINE SULFATE 4 MG/ML
4 INJECTION, SOLUTION INTRAMUSCULAR; INTRAVENOUS
Status: DISCONTINUED | OUTPATIENT
Start: 2024-02-01 | End: 2024-02-01 | Stop reason: HOSPADM

## 2024-02-01 RX ORDER — MAGNESIUM HYDROXIDE/ALUMINUM HYDROXICE/SIMETHICONE 120; 1200; 1200 MG/30ML; MG/30ML; MG/30ML
15 SUSPENSION ORAL ONCE
Status: COMPLETED | OUTPATIENT
Start: 2024-02-01 | End: 2024-02-01

## 2024-02-01 RX ADMIN — ONDANSETRON 4 MG: 2 INJECTION INTRAMUSCULAR; INTRAVENOUS at 06:50

## 2024-02-01 RX ADMIN — FAMOTIDINE 20 MG: 10 INJECTION, SOLUTION INTRAVENOUS at 08:42

## 2024-02-01 RX ADMIN — IOPAMIDOL 72 ML: 755 INJECTION, SOLUTION INTRAVENOUS at 06:20

## 2024-02-01 RX ADMIN — SODIUM CHLORIDE 62 ML: 9 INJECTION, SOLUTION INTRAVENOUS at 06:20

## 2024-02-01 RX ADMIN — ALUMINUM HYDROXIDE, MAGNESIUM HYDROXIDE, AND SIMETHICONE 15 ML: 200; 200; 20 SUSPENSION ORAL at 09:09

## 2024-02-01 ASSESSMENT — ACTIVITIES OF DAILY LIVING (ADL)
ADLS_ACUITY_SCORE: 35
ADLS_ACUITY_SCORE: 35

## 2024-02-01 NOTE — ED TRIAGE NOTES
Pt here for upper abdominal pain which started around 2pm yesterday and migrated into back around 3pm. Pain now radiates back into shoulder blades. Patient also states that she woke up this AM around 0400 with numbness on left shoulder blade, and left face which lasted about 2 hours, now gone. No other neurological symptoms. Previous hysterectomy in 2013. States not feeling well in general since Tuesday. Bps same on BUE. MD Janice responded to stroke eval @ 0610. Has hx of fibromayalgia and is medical marijuana user.

## 2024-02-01 NOTE — ED PROVIDER NOTES
"    History     Chief Complaint:  Abdominal Pain and Numbness     The history is provided by the patient.      Oly Rush is a 48 year old female with history of fibromyalgia and anxiety who presents to the ED for evaluation of abdominal pain and numbness. The patient reports that she woke up Tuesday with some nausea and yesterday she felt feverish. The patient began to develop abdominal pain that started at 1400 yesterday as well, and she describes this pain as being below her sternum that radiates diagonally to her back and left shoulder blade. The pain in her left shoulder blade is a burning pain. She states that this morning, she developed numbness in the left shoulder blade along with the left side of her face. The numbness only lasted 2 hours and she reports that the numbness hasn't returned. Patient also still has nausea. Notes history of fibromyalgia.     Independent Historian:    None     Review of External Notes:  Reviewed 23 virtual visit      Medications:    The patient is not currently taking any prescribed medications.     Past Medical History:    Anxiety  Arthritis  Cancer  Migraines  Fibromyalgia  Pes planus of both feet  Plantar fascial fibromatosis     Past Surgical History:     section x2  Combined DaVinci hysterectomy total, salpingo-oophorectomy   Cone biopsy of cervix    Physical Exam   Patient Vitals for the past 24 hrs:   BP Temp Temp src Pulse Resp SpO2 Height Weight   24 0700 117/73 -- -- 69 -- 98 % -- --   24 0653 -- -- -- -- -- 99 % -- --   24 0652 130/76 -- -- 69 -- -- -- --   24 0612 -- -- -- -- -- -- 1.651 m (5' 5\") 98.9 kg (218 lb)   24 0602 (!) 132/96 99  F (37.2  C) Temporal 82 16 99 % -- --      Physical Exam  Constitutional: Alert, attentive  HENT:    Nose: Nose normal.    Mouth/Throat: Oropharynx is clear, mucous membranes are moist   Eyes: EOM are normal.   CV: regular rate and rhythm; no murmurs, rubs or gallups  Chest: Effort " normal and breath sounds normal.   GI:  There is epigastric tenderness. No distension. Normal bowel sounds  MSK: Normal range of motion.   Neurological: Alert, attentive   Normal, symmetric strength   Normal sensation   Normal gait  Skin: Skin is warm and dry.      Emergency Department Course   ECG  ECG taken at 0633, ECG read at 0636  Normal sinus rhythm   When compared with prior ECG, dated 3/4/20, no significant change noted.  Rate 76 bpm. SD interval 192 ms. QRS duration 84 ms. QT/QTc 380/427 ms. P-R-T axes 57 9 39.    Imaging:  US Abdomen Limited   Final Result   IMPRESSION:   1.  Hepatic steatosis.   2.  Nonvisualization of the pancreas.      VADIM LEON MD            SYSTEM ID:  [id number]      CT Aortic Survey w Contrast   Final Result   IMPRESSION:   1.  No acute aortic findings. Negative for aneurysm or dissection. No pulmonary embolism. No acute vascular findings.      2.  No acute abnormalities or CT findings to explain the patient's symptoms. Lungs are clear.      3.  Diffuse fatty infiltration of liver.      4.  No acute bowel findings.           Report per radiology    Laboratory:  Labs Ordered and Resulted from Time of ED Arrival to Time of ED Departure   COMPREHENSIVE METABOLIC PANEL - Abnormal       Result Value    Sodium 140      Potassium 4.0      Carbon Dioxide (CO2) 25      Anion Gap 12      Urea Nitrogen 8.9      Creatinine 0.85      GFR Estimate 84      Calcium 9.2      Chloride 103      Glucose 118 (*)     Alkaline Phosphatase 115      AST 27      ALT 32      Protein Total 7.5      Albumin 4.4      Bilirubin Total 0.6     CBC WITH PLATELETS AND DIFFERENTIAL - Abnormal    WBC Count 13.9 (*)     RBC Count 5.04      Hemoglobin 14.8      Hematocrit 45.6      MCV 91      MCH 29.4      MCHC 32.5      RDW 12.6      Platelet Count 348      % Neutrophils 69      % Lymphocytes 23      % Monocytes 5      % Eosinophils 2      % Basophils 1      % Immature Granulocytes 0      NRBCs per 100 WBC 0       Absolute Neutrophils 9.6 (*)     Absolute Lymphocytes 3.2      Absolute Monocytes 0.7      Absolute Eosinophils 0.3      Absolute Basophils 0.1      Absolute Immature Granulocytes 0.0      Absolute NRBCs 0.0     GLUCOSE BY METER - Abnormal    GLUCOSE BY METER POCT 117 (*)    LIPASE - Normal    Lipase 28     BILIRUBIN DIRECT - Normal    Bilirubin Direct <0.20     TROPONIN T, HIGH SENSITIVITY - Normal    Troponin T, High Sensitivity <6        Emergency Department Course & Assessments:     Interventions:  Medications   ondansetron (ZOFRAN) injection 4 mg (4 mg Intravenous $Given 2/1/24 0650)   morphine (PF) injection 4 mg (has no administration in time range)   iopamidol (ISOVUE-370) solution 500 mL (72 mLs Intravenous $Given 2/1/24 0620)   CT scan flush use (62 mLs As instructed $Given 2/1/24 0620)   famotidine (PEPCID) injection 20 mg (20 mg Intravenous $Given 2/1/24 0842)   alum & mag hydroxide-simethicone (MAALOX) suspension 15 mL (15 mLs Oral $Given 2/1/24 0909)     Assessments/Consultations/Discussion of Management or Tests:  ED Course as of 02/01/24 0937   Thu Feb 01, 2024   0609 I obtained history and examined the patient as noted above.    0725 I rechecked and updated the patient.    0845 I rechecked and updated the patient.    0929 I rechecked and updated the patient.      Social Determinants of Health affecting care:  None      Disposition:  The patient was discharged to home.     Impression & Plan    Medical Decision Making:  This is a pleasant 40-year-old female who presents for evaluation of epigastric and radiating to the back.  Differential includes possible AAA, TAD, pancreatitis, among others.  She notes some paresthesias as described above which are not consistent with stroke and she has a nonfocal, normal neurologic exam.  CT chest abdomen pelvis shows no acute pathology.  Ultrasound shows no acute pathology including no gallstones.  Symptoms are improved supportive cares for possible peptic ulcer  disease.  Given atypical symptoms, her negative EKG and undetectable high-sensitivity troponin essentially rules out AMI.  There is no evidence of PE on CT.  She notes occasional bouts of GERD but nothing like this in the past.  Given the globally reassuring workup, plan trial of 1 week of omeprazole and GI follow-up if symptoms or not improving.  Return precaution for worse pain, fever, vomiting, or any other concerns.    Diagnosis:    ICD-10-CM    1. Epigastric pain  R10.13          Discharge Medications:  New Prescriptions    No medications on file      Scribe Disclosure:  KATERINE BLANC, am serving as a scribe at 6:45 AM on 2/1/2024 to document services personally performed by Giuliano Camacho MD based on my observations and the provider's statements to me.    2/1/2024   Giuliano Camacho MD [place]ough[place], John Eric, MD  02/01/24 0949

## 2024-02-01 NOTE — DISCHARGE INSTRUCTIONS
It was a pleasure taking care of you.  I hope you feel better soon.    Your workup results were unclear but overall reassuring.    I would recommend a trial of omeprazole 20 mg daily for 1 week.  Follow-up with primary care in 1 week as needed.  Return immediately for worse pain, vomiting, or any other concerns.    Discharge Instructions  Abdominal Pain    Abdominal pain (belly pain) can be caused by many things. Your evaluation today does not show the exact cause for your pain. Your provider today has decided that it is unlikely your pain is due to a life threatening problem, or a problem requiring surgery or hospital admission. Sometimes those problems cannot be found right away, so it is very important that you follow up as directed.  Sometimes only the changes which occur over time allow the cause of your pain to be found.    Generally, every Emergency Department visit should have a follow-up clinic visit with either a primary or a specialty clinic/provider. Please follow-up as instructed by your emergency provider today. With abdominal pain, we often recommend very close follow-up, such as the following day.    ADULTS:  Return to the Emergency Department right away if:    You get an oral temperature above 102oF or as directed by your provider.  You have blood in your stools. This may be bright red or appear as black, tarry stools.    You keep vomiting (throwing up) or cannot drink liquids.  You see blood when you vomit.   You cannot have a bowel movement or you cannot pass gas.  Your stomach gets bloated or bigger.  Your skin or the whites of your eyes look yellow.  You faint.  You have bloody, frequent or painful urination (peeing).  You have new symptoms or anything that worries you.    CHILDREN:  Return to the Emergency Department right away if your child has any of the above-listed symptoms or the following:    Pushes your hand away or screams/cries when his/her belly is touched.  You notice your child is  very fussy or weak.  Your child is very tired and is too tired to eat or drink.  Your child is dehydrated.  Signs of dehydration can be:  Significant change in the amount of wet diapers/urine.  Your infant or child starts to have dry mouth and lips, or no saliva (spit) or tears.    PREGNANT WOMEN:  Return to the Emergency Department right away if you have any of the above-listed symptoms or the following:    You have bleeding, leaking fluid or passing tissue from the vagina.  You have worse pain or cramping, or pain in your shoulder or back.  You have vomiting that will not stop.  You have a temperature of 100oF or more.  Your baby is not moving as much as usual.  You faint.  You get a bad headache with or without eye problems and abdominal pain.  You have a seizure.  You have unusual discharge from your vagina and abdominal pain.    Abdominal pain is pretty common during pregnancy.  Your pain may or may not be related to your pregnancy. You should follow-up closely with your OB provider so they can evaluate you and your baby.  Until you follow-up with your regular provider, do the following:     Avoid sex and do not put anything in your vagina.  Drink clear fluids.  Only take medications approved by your provider.    MORE INFORMATION:    Appendicitis:  A possible cause of abdominal pain in any person who still has their appendix is acute appendicitis. Appendicitis is often hard to diagnose.  Testing does not always rule out early appendicitis or other causes of abdominal pain. Close follow-up with your provider and re-evaluations may be needed to figure out the reason for your abdominal pain.    Follow-up:  It is very important that you make an appointment with your clinic and go to the appointment.  If you do not follow-up with your primary provider, it may result in missing an important development which could result in permanent injury or disability and/or lasting pain.  If there is any problem keeping your  "appointment, call your provider or return to the Emergency Department.    Medications:  Take your medications as directed by your provider today.  Before using over-the-counter medications, ask your provider and make sure to take the medications as directed.  If you have any questions about medications, ask your provider.    Diet:  Resume your normal diet as much as possible, but do not eat fried, fatty or spicy foods while you have pain.  Do not drink alcohol or have caffeine.  Do not smoke tobacco.    Probiotics: If you have been given an antibiotic, you may want to also take a probiotic pill or eat yogurt with live cultures. Probiotics have \"good bacteria\" to help your intestines stay healthy. Studies have shown that probiotics help prevent diarrhea (loose stools) and other intestine problems (including C. diff infection) when you take antibiotics. You can buy these without a prescription in the pharmacy section of the store.     If you were given a prescription for medicine here today, be sure to read all of the information (including the package insert) that comes with your prescription.  This will include important information about the medicine, its side effects, and any warnings that you need to know about.  The pharmacist who fills the prescription can provide more information and answer questions you may have about the medicine.  If you have questions or concerns that the pharmacist cannot address, please call or return to the Emergency Department.       Remember that you can always come back to the Emergency Department if you are not able to see your regular provider in the amount of time listed above, if you get any new symptoms, or if there is anything that worries you.    "

## 2024-02-08 ENCOUNTER — OFFICE VISIT (OUTPATIENT)
Dept: FAMILY MEDICINE | Facility: CLINIC | Age: 49
End: 2024-02-08
Attending: FAMILY MEDICINE
Payer: COMMERCIAL

## 2024-02-08 VITALS
HEART RATE: 75 BPM | DIASTOLIC BLOOD PRESSURE: 83 MMHG | WEIGHT: 221 LBS | OXYGEN SATURATION: 98 % | BODY MASS INDEX: 36.82 KG/M2 | SYSTOLIC BLOOD PRESSURE: 132 MMHG | TEMPERATURE: 98.2 F | HEIGHT: 65 IN | RESPIRATION RATE: 19 BRPM

## 2024-02-08 DIAGNOSIS — Z01.818 PREOP GENERAL PHYSICAL EXAM: Primary | ICD-10-CM

## 2024-02-08 DIAGNOSIS — E66.812 CLASS 2 SEVERE OBESITY WITH SERIOUS COMORBIDITY AND BODY MASS INDEX (BMI) OF 36.0 TO 36.9 IN ADULT, UNSPECIFIED OBESITY TYPE (H): ICD-10-CM

## 2024-02-08 DIAGNOSIS — Z13.6 CARDIOVASCULAR SCREENING; LDL GOAL LESS THAN 160: ICD-10-CM

## 2024-02-08 DIAGNOSIS — Z12.11 SCREEN FOR COLON CANCER: ICD-10-CM

## 2024-02-08 DIAGNOSIS — E66.01 CLASS 2 SEVERE OBESITY WITH SERIOUS COMORBIDITY AND BODY MASS INDEX (BMI) OF 36.0 TO 36.9 IN ADULT, UNSPECIFIED OBESITY TYPE (H): ICD-10-CM

## 2024-02-08 DIAGNOSIS — R73.09 ELEVATED GLUCOSE: ICD-10-CM

## 2024-02-08 PROCEDURE — 99214 OFFICE O/P EST MOD 30 MIN: CPT | Performed by: FAMILY MEDICINE

## 2024-02-08 RX ORDER — TOPIRAMATE 25 MG/1
25 TABLET, FILM COATED ORAL 2 TIMES DAILY
Qty: 60 TABLET | Refills: 4 | Status: SHIPPED | OUTPATIENT
Start: 2024-02-08 | End: 2024-07-02

## 2024-02-08 SDOH — HEALTH STABILITY: PHYSICAL HEALTH: ON AVERAGE, HOW MANY DAYS PER WEEK DO YOU ENGAGE IN MODERATE TO STRENUOUS EXERCISE (LIKE A BRISK WALK)?: 3 DAYS

## 2024-02-08 ASSESSMENT — SOCIAL DETERMINANTS OF HEALTH (SDOH): HOW OFTEN DO YOU GET TOGETHER WITH FRIENDS OR RELATIVES?: ONCE A WEEK

## 2024-02-08 NOTE — PROGRESS NOTES
Preoperative Evaluation  [facility]  [address] [facility] [address]  Phone: [phone]  Primary Provider: Luly Soto  Pre-op Performing Provider: LULY SOTO  Feb 8, 2024       Manasa is a 48 year old, presenting for the following:  Pre-Op Exam        2/8/2024    10:53 AM   Additional Questions   Roomed by Contreras WEST CMA     Surgical Information  Surgery/Procedure: colonoscopy   Surgery Location: [facility]  Surgeon: Jeromy Birch MD   Surgery Date: 02/22/24  Time of Surgery: 10:00 Am  Where patient plans to recover: At home with family  Fax number for surgical facility: Note does not need to be faxed, will be available electronically in Epic.    Was in ED in Feb , stress related? Per pt    Assessment & Plan     The proposed surgical procedure is considered LOW risk.    Preop general physical exam  Cleared for colonoscopy    Screen for colon cancer  First one    Elevated glucose  Due for labs, will check at next visit  - Hemoglobin A1c; Future  - Comprehensive metabolic panel (BMP + Alb, Alk Phos, ALT, AST, Total. Bili, TP); Future    Class 2 severe obesity with serious comorbidity and body mass index (BMI) of 36.0 to 36.9 in adult, unspecified obesity type (H)  Discussed all options with patient, pros and cons. Patient in agreement with this plan.   - topiramate (TOPAMAX) 25 MG tablet; Take 1 tablet (25 mg) by mouth 2 times daily    CARDIOVASCULAR SCREENING; LDL GOAL LESS THAN 160  Due soon  - Lipid panel reflex to direct LDL Fasting; Future               - No identified additional risk factors other than previously addressed    Antiplatelet or Anticoagulation Medication Instructions      Additional Medication Instructions      Recommendation  APPROVAL GIVEN to proceed with proposed procedure, without further diagnostic evaluation.    Ordering of each unique test  Prescription drug management      Subjective       Via the  Health Maintenance questionnaire, the patient has reported the following services have been completed -Cervical Cancer Screening, this information has been sent to the abstraction team.  HPI related to upcoming procedure: screening colonoscopy du    Stress at work, working full time and it has been tough lately.  Orly wanted to take her in to ED the night before, but eventually had to go into the ED. Hot shower helped, upper back was the area on the left, the cheek flet numb and numbess /paraesthesias  in the shoulder blade area.     Painful epigastric, and sharp pain on the RUQ and her upper back on the between shoulder bladers, all resolved. Nausea also, but has resolved    Told her she needed a preop, due to medical cannibis.    Plant based diet mostly, she likes the sugar, but really changed her diet, eating healthy.    Has acid reflux and takes prilosec once per week, but tries not to use it, changes her diet, avoids acid and onions    Sugars were a bit high, was not fasting.        2/8/2024    10:36 AM   Preop Questions   1. Have you ever had a heart attack or stroke? No   2. Have you ever had surgery on your heart or blood vessels, such as a stent placement, a coronary artery bypass, or surgery on an artery in your head, neck, heart, or legs? No   3. Do you have chest pain with activity? No   4. Do you have a history of  heart failure? No   5. Do you currently have a cold, bronchitis or symptoms of other infection? No   6. Do you have a cough, shortness of breath, or wheezing? No   7. Do you or anyone in your family have previous history of blood clots? UNKNOWN -    8. Do you or does anyone in your family have a serious bleeding problem such as prolonged bleeding following surgeries or cuts? UNKNOWN -    9. Have you ever had problems with anemia or been told to take iron pills? No   10. Have you had any abnormal blood loss such as black, tarry or bloody stools, or abnormal vaginal bleeding? No   11. Have you  ever had a blood transfusion? No   12. Are you willing to have a blood transfusion if it is medically needed before, during, or after your surgery? Yes   13. Have you or any of your relatives ever had problems with anesthesia? YES -    14. Do you have sleep apnea, excessive snoring or daytime drowsiness? No   15. Do you have any artifical heart valves or other implanted medical devices like a pacemaker, defibrillator, or continuous glucose monitor? No   16. Do you have artificial joints? No   17. Are you allergic to latex? No   18. Is there any chance that you may be pregnant? No     Health Care Directive  Patient does not have a Health Care Directive or Living Will: Advance Directive received and scanned. Click on Code in the patient header to view.    Preoperative Review of    reviewed - no record of controlled substances prescribed.      Status of Chronic Conditions:  See problem list for active medical problems.  Problems all longstanding and stable, except as noted/documented.  See ROS for pertinent symptoms related to these conditions.    Patient Active Problem List    Diagnosis Date Noted    Left foot pain 01/12/2023     Priority: Medium    Pes planus of both feet 01/12/2023     Priority: Medium    Plantar fascial fibromatosis 01/12/2023     Priority: Medium    Fibromyalgia 08/21/2017     Priority: Medium    Low back pain 07/14/2014     Priority: Medium     Diagnosis updated by automated process. Provider to review and confirm.      S/P abdominal hysterectomy and left salpingo-oophorectomy 10/09/2012     Priority: Medium     2001 Pt reported severe dysplasia,Cone bx and leep, age 24yo, normal paps since then  7/14/09 NIL  6/25/10 NIL  4/18/11 NIL  2/22/12 NIL  10/2/12 supracervical hysterectomy.   7/14/14 NIL/neg HPV  3/15/16 NIL/neg HPV. Plan: Cotest in 3 years.   05/01/18: NIL pap, Neg HR HPV result. Plan yearly paps per provider for Family history of uterine or ovarian cancer: Yes: Aunt - Cervical  and Ovarian; will plan for yearly paps due to this FmHx.  19 NIL, Neg HPV. Plan pap in 1 yr due to above recommendation from Gyn  22 NIL Pap, Neg HPV. Plan cotest in 1 year.     *After excision or ablation for BARBARA 2+, cotest in 6 months, then cotest annually x 3, then cotest every 3 years for at least 25 years. As cervical cancer risk remains above general population levels, continued screening for as long as the pt remains in good health is acceptable. (2019 ASCCP guideline).  Any abnormal pap or + HR HPV test within the 25 years warrants a colposcopy.* (2019 ASCCP advisor answer). New evidence indicates that risk remains elevated for at least 25 years, with no evidence that treated patients ever return to risk levels compatible with 5-year intervals.        CARDIOVASCULAR SCREENING; LDL GOAL LESS THAN 160 2010     Priority: Medium    Migraine 2009     Priority: Medium     Problem list name updated by automated process. Provider to review      Anxiety      Priority: Medium      Past Medical History:   Diagnosis Date    Abnormal Pap smear     cone bx    Anxiety     Arthritis     Cancer (H)      Past Surgical History:   Procedure Laterality Date    C/SECTION, LOW TRANSVERSE      , Low Transverse    C/SECTION, LOW TRANSVERSE  2004    , Low Transverse    DAVINCI HYSTERECTOMY TOTAL, BILATERAL SALPINGO-OOPHORECTOMY, COMBINED  10/2/2012    Procedure: COMBINED DAVINCI HYSTERECTOMY TOTAL, SALPINGO-OOPHORECTOMY;   DAVINCI  Supra cervical HYSTERECTOMY TOTAL, Bilateral Salpingectomies with Left Oophorectomy and Cystoscopy;  Surgeon: Manasa Santos DO;  Location:  OR    [facility] NONSPECIFIC PROCEDURE      Cone Biopsy of cervix     Current Outpatient Medications   Medication Sig Dispense Refill    ascorbic acid (VITAMIN C) 1000 MG TABS Take 1 tablet (1,000 mg) by mouth daily 30 tablet     cholecalciferol (VITAMIN D3) 5000 UNITS TABS tablet Take by mouth daily      Dionne, Zingiber  "officinalis, (COSNTANCE PO)       medical cannabis (Patient's own supply) See Admin Instructions (The purpose of this order is to document that the patient reports taking medical cannabis.  This is not a prescription, and is not used to certify that the patient has a qualifying medical condition.)      Probiotic Product (ACIDOPHILUS PROBIOTIC BLEND) CAPS       vitamin B complex with vitamin C (STRESS TAB) tablet Take 1 tablet by mouth daily  0       Allergies   Allergen Reactions    Amoxicillin-Pot Clavulanate Nausea and Vomiting    Flagyl [Metronidazole] Other (See Comments)     paresthesias        Social History     Tobacco Use    Smoking status: Some Days     Types: Other    Smokeless tobacco: Never    Tobacco comments:     Medical marijuana   Substance Use Topics    Alcohol use: Not Currently     Comment: I drink very little at this point. One drink every two month       History   Drug Use No         Review of Systems    Review of Systems  Constitutional, HEENT, cardiovascular, pulmonary, GI, , musculoskeletal, neuro, skin, endocrine and psych systems are negative, except as otherwise noted.  Objective    /83 (BP Location: Right arm, Patient Position: Sitting, Cuff Size: Adult Large)   Pulse 75   Temp 98.2  F (36.8  C) (Oral)   Resp 19   Ht 1.651 m (5' 5\")   Wt 100.2 kg (221 lb)   LMP 09/26/2012   SpO2 98%   BMI 36.78 kg/m     Estimated body mass index is 36.78 kg/m  as calculated from the following:    Height as of this encounter: 1.651 m (5' 5\").    Weight as of this encounter: 100.2 kg (221 lb).  Physical Exam  GENERAL: alert and no distress  EYES: Eyes grossly normal to inspection, PERRL and conjunctivae and sclerae normal  HENT: ear canals and TM's normal, nose and mouth without ulcers or lesions  NECK: no adenopathy, no asymmetry, masses, or scars  RESP: lungs clear to auscultation - no rales, rhonchi or wheezes  CV: regular rate and rhythm, normal S1 S2, no S3 or S4, no murmur, click or rub, " no peripheral edema  ABDOMEN: soft, nontender, no hepatosplenomegaly, no masses and bowel sounds normal  MS: no gross musculoskeletal defects noted, no edema  SKIN: no suspicious lesions or rashes  NEURO: Normal strength and tone, mentation intact and speech normal  PSYCH: mentation appears normal, affect normal/bright    Recent Labs   Lab Test 02/01/24  0610 05/27/22  0738   HGB 14.8 14.6    344    139   POTASSIUM 4.0 4.1   CR 0.85 0.80        Diagnostics  No labs were ordered during this visit.   No EKG required for low risk surgery (cataract, skin procedure, breast biopsy, etc).    Revised Cardiac Risk Index (RCRI)  The patient has the following serious cardiovascular risks for perioperative complications:   - No serious cardiac risks = 0 points     RCRI Interpretation: 0 points: Class I (very low risk - 0.4% complication rate)         Signed Electronically by: Carrie Prasad MD  Copy of this evaluation report is provided to requesting physician.

## 2024-02-16 ENCOUNTER — PREP FOR PROCEDURE (OUTPATIENT)
Dept: SURGERY | Facility: CLINIC | Age: 49
End: 2024-02-16
Payer: COMMERCIAL

## 2024-02-16 DIAGNOSIS — Z12.11 SCREEN FOR COLON CANCER: Primary | ICD-10-CM

## 2024-02-22 ENCOUNTER — HOSPITAL ENCOUNTER (OUTPATIENT)
Facility: CLINIC | Age: 49
Discharge: HOME | End: 2024-02-22
Attending: STUDENT IN AN ORGANIZED HEALTH CARE EDUCATION/TRAINING PROGRAM | Admitting: STUDENT IN AN ORGANIZED HEALTH CARE EDUCATION/TRAINING PROGRAM
Payer: COMMERCIAL

## 2024-02-22 ENCOUNTER — ANESTHESIA (OUTPATIENT)
Dept: SURGERY | Facility: CLINIC | Age: 49
End: 2024-02-22
Payer: COMMERCIAL

## 2024-02-22 ENCOUNTER — ANESTHESIA EVENT (OUTPATIENT)
Dept: SURGERY | Facility: CLINIC | Age: 49
End: 2024-02-22
Payer: COMMERCIAL

## 2024-02-22 VITALS
TEMPERATURE: 98.2 F | HEIGHT: 65 IN | DIASTOLIC BLOOD PRESSURE: 84 MMHG | SYSTOLIC BLOOD PRESSURE: 118 MMHG | BODY MASS INDEX: 35.77 KG/M2 | WEIGHT: 214.7 LBS | OXYGEN SATURATION: 100 % | HEART RATE: 57 BPM | RESPIRATION RATE: 18 BRPM

## 2024-02-22 LAB — COLONOSCOPY: NORMAL

## 2024-02-22 PROCEDURE — 250N000011 HC RX IP 250 OP 636: Performed by: NURSE ANESTHETIST, CERTIFIED REGISTERED

## 2024-02-22 PROCEDURE — 272N000001 HC OR GENERAL SUPPLY STERILE: Performed by: STUDENT IN AN ORGANIZED HEALTH CARE EDUCATION/TRAINING PROGRAM

## 2024-02-22 PROCEDURE — 250N000009 HC RX 250: Performed by: NURSE ANESTHETIST, CERTIFIED REGISTERED

## 2024-02-22 PROCEDURE — 360N000075 HC SURGERY LEVEL 2, PER MIN: Performed by: STUDENT IN AN ORGANIZED HEALTH CARE EDUCATION/TRAINING PROGRAM

## 2024-02-22 PROCEDURE — 258N000003 HC RX IP 258 OP 636: Performed by: ANESTHESIOLOGY

## 2024-02-22 PROCEDURE — 999N000141 HC STATISTIC PRE-PROCEDURE NURSING ASSESSMENT: Performed by: STUDENT IN AN ORGANIZED HEALTH CARE EDUCATION/TRAINING PROGRAM

## 2024-02-22 PROCEDURE — 258N000003 HC RX IP 258 OP 636: Performed by: NURSE ANESTHETIST, CERTIFIED REGISTERED

## 2024-02-22 PROCEDURE — 370N000017 HC ANESTHESIA TECHNICAL FEE, PER MIN: Performed by: STUDENT IN AN ORGANIZED HEALTH CARE EDUCATION/TRAINING PROGRAM

## 2024-02-22 PROCEDURE — 710N000012 HC RECOVERY PHASE 2, PER MINUTE: Performed by: STUDENT IN AN ORGANIZED HEALTH CARE EDUCATION/TRAINING PROGRAM

## 2024-02-22 RX ORDER — PROCHLORPERAZINE MALEATE 10 MG
10 TABLET ORAL EVERY 6 HOURS PRN
Status: DISCONTINUED | OUTPATIENT
Start: 2024-02-22 | End: 2024-02-22 | Stop reason: HOSPADM

## 2024-02-22 RX ORDER — ONDANSETRON 4 MG/1
4 TABLET, ORALLY DISINTEGRATING ORAL EVERY 30 MIN PRN
Status: DISCONTINUED | OUTPATIENT
Start: 2024-02-22 | End: 2024-02-22 | Stop reason: HOSPADM

## 2024-02-22 RX ORDER — SODIUM CHLORIDE, SODIUM LACTATE, POTASSIUM CHLORIDE, CALCIUM CHLORIDE 600; 310; 30; 20 MG/100ML; MG/100ML; MG/100ML; MG/100ML
INJECTION, SOLUTION INTRAVENOUS CONTINUOUS
Status: DISCONTINUED | OUTPATIENT
Start: 2024-02-22 | End: 2024-02-22 | Stop reason: HOSPADM

## 2024-02-22 RX ORDER — ONDANSETRON 2 MG/ML
4 INJECTION INTRAMUSCULAR; INTRAVENOUS EVERY 6 HOURS PRN
Status: DISCONTINUED | OUTPATIENT
Start: 2024-02-22 | End: 2024-02-22 | Stop reason: HOSPADM

## 2024-02-22 RX ORDER — NALOXONE HYDROCHLORIDE 0.4 MG/ML
0.1 INJECTION, SOLUTION INTRAMUSCULAR; INTRAVENOUS; SUBCUTANEOUS
Status: DISCONTINUED | OUTPATIENT
Start: 2024-02-22 | End: 2024-02-22 | Stop reason: HOSPADM

## 2024-02-22 RX ORDER — SODIUM CHLORIDE, SODIUM LACTATE, POTASSIUM CHLORIDE, CALCIUM CHLORIDE 600; 310; 30; 20 MG/100ML; MG/100ML; MG/100ML; MG/100ML
INJECTION, SOLUTION INTRAVENOUS CONTINUOUS PRN
Status: DISCONTINUED | OUTPATIENT
Start: 2024-02-22 | End: 2024-02-22

## 2024-02-22 RX ORDER — LIDOCAINE 40 MG/G
CREAM TOPICAL
Status: DISCONTINUED | OUTPATIENT
Start: 2024-02-22 | End: 2024-02-22 | Stop reason: HOSPADM

## 2024-02-22 RX ORDER — OXYCODONE HYDROCHLORIDE 5 MG/1
5 TABLET ORAL
Status: DISCONTINUED | OUTPATIENT
Start: 2024-02-22 | End: 2024-02-22 | Stop reason: HOSPADM

## 2024-02-22 RX ORDER — PROPOFOL 10 MG/ML
INJECTION, EMULSION INTRAVENOUS CONTINUOUS PRN
Status: DISCONTINUED | OUTPATIENT
Start: 2024-02-22 | End: 2024-02-22

## 2024-02-22 RX ORDER — ONDANSETRON 2 MG/ML
4 INJECTION INTRAMUSCULAR; INTRAVENOUS EVERY 30 MIN PRN
Status: DISCONTINUED | OUTPATIENT
Start: 2024-02-22 | End: 2024-02-22 | Stop reason: HOSPADM

## 2024-02-22 RX ORDER — FLUMAZENIL 0.1 MG/ML
0.2 INJECTION, SOLUTION INTRAVENOUS
Status: DISCONTINUED | OUTPATIENT
Start: 2024-02-22 | End: 2024-02-22 | Stop reason: HOSPADM

## 2024-02-22 RX ORDER — ONDANSETRON 4 MG/1
4 TABLET, ORALLY DISINTEGRATING ORAL EVERY 6 HOURS PRN
Status: DISCONTINUED | OUTPATIENT
Start: 2024-02-22 | End: 2024-02-22 | Stop reason: HOSPADM

## 2024-02-22 RX ORDER — GLYCOPYRROLATE 0.2 MG/ML
INJECTION, SOLUTION INTRAMUSCULAR; INTRAVENOUS PRN
Status: DISCONTINUED | OUTPATIENT
Start: 2024-02-22 | End: 2024-02-22

## 2024-02-22 RX ORDER — HYDROMORPHONE HCL IN WATER/PF 6 MG/30 ML
0.2 PATIENT CONTROLLED ANALGESIA SYRINGE INTRAVENOUS EVERY 5 MIN PRN
Status: DISCONTINUED | OUTPATIENT
Start: 2024-02-22 | End: 2024-02-22 | Stop reason: HOSPADM

## 2024-02-22 RX ORDER — OXYCODONE HYDROCHLORIDE 5 MG/1
10 TABLET ORAL
Status: DISCONTINUED | OUTPATIENT
Start: 2024-02-22 | End: 2024-02-22 | Stop reason: HOSPADM

## 2024-02-22 RX ORDER — FENTANYL CITRATE 50 UG/ML
25 INJECTION, SOLUTION INTRAMUSCULAR; INTRAVENOUS EVERY 5 MIN PRN
Status: DISCONTINUED | OUTPATIENT
Start: 2024-02-22 | End: 2024-02-22 | Stop reason: HOSPADM

## 2024-02-22 RX ORDER — KETAMINE HYDROCHLORIDE 10 MG/ML
INJECTION INTRAMUSCULAR; INTRAVENOUS PRN
Status: DISCONTINUED | OUTPATIENT
Start: 2024-02-22 | End: 2024-02-22

## 2024-02-22 RX ORDER — ONDANSETRON 2 MG/ML
INJECTION INTRAMUSCULAR; INTRAVENOUS PRN
Status: DISCONTINUED | OUTPATIENT
Start: 2024-02-22 | End: 2024-02-22

## 2024-02-22 RX ORDER — FENTANYL CITRATE 50 UG/ML
50 INJECTION, SOLUTION INTRAMUSCULAR; INTRAVENOUS EVERY 5 MIN PRN
Status: DISCONTINUED | OUTPATIENT
Start: 2024-02-22 | End: 2024-02-22 | Stop reason: HOSPADM

## 2024-02-22 RX ORDER — HYDROMORPHONE HCL IN WATER/PF 6 MG/30 ML
0.4 PATIENT CONTROLLED ANALGESIA SYRINGE INTRAVENOUS EVERY 5 MIN PRN
Status: DISCONTINUED | OUTPATIENT
Start: 2024-02-22 | End: 2024-02-22 | Stop reason: HOSPADM

## 2024-02-22 RX ADMIN — Medication 10 MG: at 11:08

## 2024-02-22 RX ADMIN — SODIUM CHLORIDE, POTASSIUM CHLORIDE, SODIUM LACTATE AND CALCIUM CHLORIDE: 600; 310; 30; 20 INJECTION, SOLUTION INTRAVENOUS at 10:43

## 2024-02-22 RX ADMIN — Medication 20 MG: at 10:56

## 2024-02-22 RX ADMIN — PROPOFOL 125 MCG/KG/MIN: 10 INJECTION, EMULSION INTRAVENOUS at 10:45

## 2024-02-22 RX ADMIN — ONDANSETRON 4 MG: 2 INJECTION INTRAMUSCULAR; INTRAVENOUS at 10:57

## 2024-02-22 RX ADMIN — GLYCOPYRROLATE 0.2 MG: 0.2 INJECTION, SOLUTION INTRAMUSCULAR; INTRAVENOUS at 10:42

## 2024-02-22 RX ADMIN — SODIUM CHLORIDE, POTASSIUM CHLORIDE, SODIUM LACTATE AND CALCIUM CHLORIDE: 600; 310; 30; 20 INJECTION, SOLUTION INTRAVENOUS at 09:57

## 2024-02-22 NOTE — ANESTHESIA PREPROCEDURE EVALUATION
Anesthesia Pre-Procedure Evaluation    Patient: Oly Rush   MRN: [id number] : 1975        Procedure : Procedure(s):  COLONOSCOPY          Past Medical History:   Diagnosis Date    Abnormal Pap smear     cone bx    Anxiety     Arthritis     Cancer (H)     PONV (postoperative nausea and vomiting)       Past Surgical History:   Procedure Laterality Date    C/SECTION, LOW TRANSVERSE      , Low Transverse    C/SECTION, LOW TRANSVERSE  2004    , Low Transverse    DAVINCI HYSTERECTOMY TOTAL, BILATERAL SALPINGO-OOPHORECTOMY, COMBINED  10/2/2012    Procedure: COMBINED DAVINCI HYSTERECTOMY TOTAL, SALPINGO-OOPHORECTOMY;   DAVINCI  Supra cervical HYSTERECTOMY TOTAL, Bilateral Salpingectomies with Left Oophorectomy and Cystoscopy;  Surgeon: Manasa Santos DO;  Location:  OR    [facility] NONSPECIFIC PROCEDURE      Cone Biopsy of cervix      Allergies   Allergen Reactions    Amoxicillin-Pot Clavulanate Nausea and Vomiting    Flagyl [Metronidazole] Other (See Comments)     paresthesias      Social History     Tobacco Use    Smoking status: Some Days     Types: Other    Smokeless tobacco: Never    Tobacco comments:     Medical marijuana   Substance Use Topics    Alcohol use: Not Currently     Comment: I drink very little at this point. One drink every two month      Wt Readings from Last 1 Encounters:   24 97.4 kg (214 lb 11.2 oz)        Anesthesia Evaluation            ROS/MED HX  ENT/Pulmonary:  - neg pulmonary ROS     Neurologic:  - neg neurologic ROS     Cardiovascular:  - neg cardiovascular ROS     METS/Exercise Tolerance: >4 METS    Hematologic:  - neg hematologic  ROS     Musculoskeletal: Comment: Fibromyalgia      GI/Hepatic:  - neg GI/hepatic ROS     Renal/Genitourinary:  - neg Renal ROS     Endo: Comment: .Body mass index is 35.73 kg/m .        Psychiatric/Substance Use:     (+) psychiatric history anxiety   Recreational drug usage: Cannabis.    Infectious Disease:  - neg  infectious disease ROS     Malignancy:  - neg malignancy ROS     Other:  - neg other ROS          Physical Exam    Airway        Mallampati: II    Neck ROM: full     Respiratory Devices and Support         Dental           Cardiovascular   cardiovascular exam normal       Rhythm and rate: regular     Pulmonary   pulmonary exam normal                OUTSIDE LABS:  CBC:   Lab Results   Component Value Date    WBC 13.9 (H) 02/01/2024    WBC 10.1 05/27/2022    HGB 14.8 02/01/2024    HGB 14.6 05/27/2022    HCT 45.6 02/01/2024    HCT 44.7 05/27/2022     02/01/2024     05/27/2022     BMP:   Lab Results   Component Value Date     02/01/2024     05/27/2022    POTASSIUM 4.0 02/01/2024    POTASSIUM 4.1 05/27/2022    CHLORIDE 103 02/01/2024    CHLORIDE 108 05/27/2022    CO2 25 02/01/2024    CO2 28 05/27/2022    BUN 8.9 02/01/2024    BUN 11 05/27/2022    CR 0.85 02/01/2024    CR 0.80 05/27/2022     (H) 02/01/2024     (H) 02/01/2024     COAGS:   Lab Results   Component Value Date    PTT 29 03/22/2018    INR 0.91 03/22/2018     POC:   Lab Results   Component Value Date    HCG Negative 03/22/2018     HEPATIC:   Lab Results   Component Value Date    ALBUMIN 4.4 02/01/2024    PROTTOTAL 7.5 02/01/2024    ALT 32 02/01/2024    AST 27 02/01/2024    ALKPHOS 115 02/01/2024    BILITOTAL 0.6 02/01/2024     OTHER:   Lab Results   Component Value Date    PH 5.5 12/08/2011    LACT 1.3 03/04/2020    RIO 9.2 02/01/2024    LIPASE 28 02/01/2024    TSH 1.83 05/27/2022    CRP 5.2 04/10/2018    SED 12 07/06/2022       Anesthesia Plan    ASA Status:  2       Anesthesia Type: MAC.   Induction: Propofol, Intravenous.           Consents    Anesthesia Plan(s) and associated risks, benefits, and realistic alternatives discussed. Questions answered and patient/representative(s) expressed understanding.     - Discussed:     - Discussed with:  Patient            Postoperative Care    Pain management: IV analgesics, Oral  "pain medications, Multi-modal analgesia.   PONV prophylaxis: Ondansetron (or other 5HT-3)     Comments:               Christo Chapa, DO    I have reviewed the pertinent notes and labs in the chart from the past 30 days and (re)examined the patient.  Any updates or changes from those notes are reflected in this note.              # Obesity: Estimated body mass index is 35.73 kg/m  as calculated from the following:    Height as of this encounter: 1.651 m (5' 5\").    Weight as of this encounter: 97.4 kg (214 lb 11.2 oz).      "

## 2024-02-22 NOTE — ANESTHESIA CARE TRANSFER NOTE
Patient: Oly Rush    Procedure: Procedure(s):  COLONOSCOPY       Diagnosis: Screen for colon cancer [Z12.11]  Diagnosis Additional Information: No value filed.    Anesthesia Type:   MAC     Note:    Oropharynx: oropharynx clear of all foreign objects  Level of Consciousness: awake  Oxygen Supplementation: room air    Independent Airway: airway patency satisfactory and stable  Dentition: dentition unchanged  Vital Signs Stable: post-procedure vital signs reviewed and stable  Report to RN Given: handoff report given  Patient transferred to: Phase II    Handoff Report: Identifed the Patient, Identified the Reponsible Provider, Reviewed the pertinent medical history, Discussed the surgical course, Reviewed Intra-OP anesthesia mangement and issues during anesthesia, Set expectations for post-procedure period and Allowed opportunity for questions and acknowledgement of understanding      Vitals:  Vitals Value Taken Time   BP     Temp     Pulse     Resp     SpO2 100 % 02/22/24 1119   Vitals shown include unfiled device data.    Electronically Signed By: JENA Styles CRNA  February 22, 2024  11:20 AM

## 2024-02-22 NOTE — ANESTHESIA POSTPROCEDURE EVALUATION
Patient: Oly Rush    Procedure: Procedure(s):  COLONOSCOPY       Anesthesia Type:  MAC    Note:     Postop Pain Control: Uneventful            Sign Out: Well controlled pain   PONV: No   Neuro/Psych: Uneventful            Sign Out: Acceptable/Baseline neuro status   Airway/Respiratory:             Sign Out: Acceptable/Baseline resp. status   CV/Hemodynamics:             Sign Out: Acceptable CV status   Other NRE:    DID A NON-ROUTINE EVENT OCCUR?            Last vitals:  Vitals Value Taken Time   /84 02/22/24 1200   Temp 98.2  F (36.8  C) 02/22/24 1118   Pulse 57 02/22/24 1200   Resp 18 02/22/24 1120   SpO2 100 % 02/22/24 1215       Electronically Signed By: Christo Chapa DO  February 22, 2024  1:37 PM

## 2024-03-19 ENCOUNTER — PATIENT OUTREACH (OUTPATIENT)
Dept: CARE COORDINATION | Facility: CLINIC | Age: 49
End: 2024-03-19
Payer: COMMERCIAL

## 2024-04-16 NOTE — PATIENT INSTRUCTIONS
Thank you for choosing us for your care. I have placed an order for a prescription so that you can start treatment. View your full visit summary for details by clicking on the link below. Your pharmacist will able to address any questions you may have about the medication.     If you're not feeling better within 5-7 days, please schedule an appointment.  You can schedule an appointment right here in [place], or call [phone]  If the visit is for the same symptoms as your e-visit, we'll refund the cost of your e-visit if seen within seven days.      Viral or Bacterial Bronchitis with Wheezing (Adult)    Bronchitis is an infection of the air passages. It often occurs during a cold and is usually caused by a virus. Symptoms include cough with mucus (phlegm) and low-grade fever. This illness is contagious during the first few days and is spread through the air by coughing and sneezing, or by direct contact (touching the sick person and then touching your own eyes, nose, or mouth).  If there is a lot of inflammation, air flow is restricted. The air passages may also go into spasm, especially if you have asthma. This causes wheezing and difficulty breathing even in people who do not have asthma.  Bronchitis usually lasts 7 to 14 days. The wheezing should improve with treatment during the first week. An inhaler is often prescribed to relax the air passages and stop wheezing. Antibiotics will be prescribed if your doctor thinks there is also a secondary bacterial infection.  Home care    If symptoms are severe, rest at home for the first 2 to 3 days. When you go back to your usual activities, don't let yourself get too tired.    Dont s'moke. Also avoid being exposed to secondhand smoke.    You may use over-the-counter medicine to control fever or pain, unless another medicine was prescribed. Note: If you have chronic liver or kidney disease or have ever had a stomach ulcer or gastrointestinal bleeding, talk with your  healthcare provider before using these medicines. Also talk to your provider if you are taking medicine to prevent blood clots.) Aspirin should never be given to anyone younger than 18 years of age who is ill with a viral infection or fever. It may cause severe liver or brain damage.    Your appetite may be poor, so a light diet is fine. Stay well hydrated by drinking 6 to 8 glasses of fluids per day (such as water, soft drinks, sports drinks, juices, tea, or soup). Extra fluids will help loosen secretions in the nose and lungs.    Over-the-counter cough, cold, and sore-throat medicines will not shorten the length of the illness, but they may be helpful to reduce symptoms. (Note: Don't use decongestants if you have high blood pressure.)    If you were given an inhaler, use it exactly as directed. If you need to use it more often than prescribed, your condition may be worsening. If this happens, contact your healthcare provider.    If prescribed, finish all antibiotic medicine, even if you are feeling better after only a few days.  Follow-up care  Follow up with your healthcare provider, or as advised. If you had an X-ray or ECG (electrocardiogram), a specialist will review it. You will be notified of any new findings that may affect your care.  If you are age 65 or older, or if you have a chronic lung disease or condition that affects your immune system, or you smoke, ask your healthcare provider about getting a pneumococcal vaccine and a yearly flu shot (influenza vaccine).  When to seek medical advice  Call your healthcare provider right away if any of these occur:    Fever of 100.4 F (38 C) or higher, or as directed by your healthcare provider    Coughing up increasing amounts of colored sputum    Weakness, drowsiness, headache, facial pain, ear pain, or a stiff neck  Call 911  Call 911 if any of these occur.    Coughing up blood    Worsening weakness, drowsiness, headache, or stiff neck    Increased wheezing not  helped with medication, shortness of breath, or pain with breathing   Date Last Reviewed: 6/1/2018 2000-2018 [employer]. [address] [place] [address], [place] [address]. All rights reserved. This information is not intended as a substitute for professional medical care. Always follow your healthcare professional's instructions.         Estimated protein-energy needs calculated using current dosing weight of 184 pounds (4/12) with consideration for foot wound and Stage IV Rectal Adenocarcinoma (per oncology).   Defer fluid calculations to the medical team.

## 2024-06-23 ENCOUNTER — HEALTH MAINTENANCE LETTER (OUTPATIENT)
Age: 49
End: 2024-06-23

## 2024-07-02 ENCOUNTER — OFFICE VISIT (OUTPATIENT)
Dept: FAMILY MEDICINE | Facility: CLINIC | Age: 49
End: 2024-07-02
Payer: COMMERCIAL

## 2024-07-02 VITALS
DIASTOLIC BLOOD PRESSURE: 80 MMHG | RESPIRATION RATE: 16 BRPM | SYSTOLIC BLOOD PRESSURE: 120 MMHG | HEART RATE: 74 BPM | WEIGHT: 218.9 LBS | BODY MASS INDEX: 36.47 KG/M2 | TEMPERATURE: 97.8 F | HEIGHT: 65 IN | OXYGEN SATURATION: 98 %

## 2024-07-02 DIAGNOSIS — Z12.31 VISIT FOR SCREENING MAMMOGRAM: ICD-10-CM

## 2024-07-02 DIAGNOSIS — Z00.00 ROUTINE GENERAL MEDICAL EXAMINATION AT A HEALTH CARE FACILITY: ICD-10-CM

## 2024-07-02 DIAGNOSIS — E55.9 VITAMIN D DEFICIENCY: ICD-10-CM

## 2024-07-02 DIAGNOSIS — R73.09 ELEVATED GLUCOSE: ICD-10-CM

## 2024-07-02 DIAGNOSIS — E56.9 VITAMIN DEFICIENCY: ICD-10-CM

## 2024-07-02 DIAGNOSIS — Z12.4 CERVICAL CANCER SCREENING: Primary | ICD-10-CM

## 2024-07-02 DIAGNOSIS — Z13.220 SCREENING FOR HYPERLIPIDEMIA: ICD-10-CM

## 2024-07-02 PROCEDURE — 87624 HPV HI-RISK TYP POOLED RSLT: CPT | Performed by: NURSE PRACTITIONER

## 2024-07-02 PROCEDURE — G0145 SCR C/V CYTO,THINLAYER,RESCR: HCPCS | Performed by: NURSE PRACTITIONER

## 2024-07-02 PROCEDURE — 99214 OFFICE O/P EST MOD 30 MIN: CPT | Mod: 25 | Performed by: NURSE PRACTITIONER

## 2024-07-02 PROCEDURE — 99396 PREV VISIT EST AGE 40-64: CPT | Performed by: NURSE PRACTITIONER

## 2024-07-02 SDOH — HEALTH STABILITY: PHYSICAL HEALTH: ON AVERAGE, HOW MANY DAYS PER WEEK DO YOU ENGAGE IN MODERATE TO STRENUOUS EXERCISE (LIKE A BRISK WALK)?: 2 DAYS

## 2024-07-02 ASSESSMENT — PAIN SCALES - GENERAL: PAINLEVEL: MILD PAIN (3)

## 2024-07-02 ASSESSMENT — SOCIAL DETERMINANTS OF HEALTH (SDOH): HOW OFTEN DO YOU GET TOGETHER WITH FRIENDS OR RELATIVES?: ONCE A WEEK

## 2024-07-02 NOTE — NURSING NOTE
"Chief Complaint   Patient presents with    Physical     And PAP     Initial /80 (BP Location: Right arm, Patient Position: Sitting, Cuff Size: Adult Large)   Pulse 74   Temp 97.8  F (36.6  C) (Oral)   Resp 16   Ht 1.645 m (5' 4.75\")   Wt 99.3 kg (218 lb 14.4 oz)   LMP 09/26/2012   SpO2 98%   BMI 36.71 kg/m   Estimated body mass index is 36.71 kg/m  as calculated from the following:    Height as of this encounter: 1.645 m (5' 4.75\").    Weight as of this encounter: 99.3 kg (218 lb 14.4 oz).  BP completed using cuff size large right arm    Lisa Magill, CMA    "

## 2024-07-02 NOTE — PROGRESS NOTES
"Preventive Care Visit  [facility] JENA Serrano CNP, Family Medicine  Jul 2, 2024      Assessment & Plan     Cervical cancer screening  - Pap Screen with HPV - Recommended Age 30 - 65 Years    Visit for screening mammogram  - MA Screening Bilateral w/ Jerry; Future    Elevated glucose  Noted on previous labs; rechecking  - Glucose; Future  - Hemoglobin A1c; Future    Vitamin D deficiency  Intermittent use of supplement; rechecking  - Vitamin D Deficiency; Future    Routine general medical examination at a health care facility  Reviewed age appropriate screenings and immunizations.     Vitamin deficiency  Intermittent use of supplement; rechecking   - Vitamin B12; Future    Screening for hyperlipidemia  Screen, fasting   - Lipid panel reflex to direct LDL Fasting; Future          Nicotine/Tobacco Cessation  She reports that she has been smoking other. She has never used smokeless tobacco.  Nicotine/Tobacco Cessation Plan  Does not use nicotine/tobacco      BMI  Estimated body mass index is 36.71 kg/m  as calculated from the following:    Height as of this encounter: 1.645 m (5' 4.75\").    Weight as of this encounter: 99.3 kg (218 lb 14.4 oz).   Weight management plan: Discussed healthy diet and exercise guidelines    Counseling  Appropriate preventive services were discussed with this patient, including applicable screening as appropriate for fall prevention, nutrition, physical activity, Tobacco-use cessation, weight loss and cognition.  Checklist reviewing preventive services available has been given to the patient.  Reviewed patient's diet, addressing concerns and/or questions.   She is at risk for lack of exercise and has been provided with information to increase physical activity for the benefit of her well-being.       Rin Goel is a 49 year old, presenting for the following:  Physical (And PAP)        7/2/2024    10:47 AM   Additional Questions   Roomed by Lisa Magill, " CMA   Accompanied by self         7/2/2024    10:47 AM   Patient Reported Additional Medications   Patient reports taking the following new medications NONE        Health Care Directive  Patient has a Health Care Directive on file  Advance care planning document is on file but is outdated.  Patient encouraged to updated.    HPI    Weight management  Renewal of medical cannabis questions   Discuss a medication for family members regarding alzheimer's    Dad passed away over memorial weekend--non Hodgkin's.     PCP had prescribed topamax in the past, but she never started medication.   Doesn't like to be on meds.   Plans to get to back on track with diet and exercise.             7/2/2024   General Health   How would you rate your overall physical health? Good   Feel stress (tense, anxious, or unable to sleep) To some extent      (!) STRESS CONCERN      7/2/2024   Nutrition   Three or more servings of calcium each day? (!) NO   Diet: Regular (no restrictions)   How many servings of fruit and vegetables per day? (!) 2-3   How many sweetened beverages each day? 0-1            7/2/2024   Exercise   Days per week of moderate/strenous exercise 2 days      (!) EXERCISE CONCERN      7/2/2024   Social Factors   Frequency of gathering with friends or relatives Once a week   Worry food won't last until get money to buy more No   Food not last or not have enough money for food? No   Do you have housing? (Housing is defined as stable permanent housing and does not include staying ouside in a car, in a tent, in an abandoned building, in an overnight shelter, or couch-surfing.) Yes   Are you worried about losing your housing? No   Lack of transportation? No   Unable to get utilities (heat,electricity)? No            7/2/2024   Dental   Dentist two times every year? Yes            7/2/2024   TB Screening   Were you born outside of the [place]? No              Today's PHQ-2 Score:       2/8/2024    10:36 AM   PHQ-2 ( 1999 Pfizer)   Q1:  Little interest or pleasure in doing things 0   Q2: Feeling down, depressed or hopeless 1   PHQ-2 Score 1   Q1: Little interest or pleasure in doing things Not at all   Q2: Feeling down, depressed or hopeless Several days   PHQ-2 Score 1         7/2/2024   Substance Use   Alcohol more than 3/day or more than 7/wk Not Applicable   Do you use any other substances recreationally? (!) CANNABIS PRODUCTS        Social History     Tobacco Use    Smoking status: Some Days     Types: Other    Smokeless tobacco: Never    Tobacco comments:     Medical marijuana   Vaping Use    Vaping status: Never Used   Substance Use Topics    Alcohol use: Not Currently     Comment: I drink very little at this point. One drink every two month    Drug use: No           7/3/2022   LAST FHS-7 RESULTS   1st degree relative breast or ovarian cancer No   Any relative bilateral breast cancer No   Any male have breast cancer No   Any ONE woman have BOTH breast AND ovarian cancer No   Any woman with breast cancer before 50yrs No   2 or more relatives with breast AND/OR ovarian cancer No   2 or more relatives with breast AND/OR bowel cancer No           Mammogram Screening - Mammogram every 1-2 years updated in Health Maintenance based on mutual decision making        7/2/2024   STI Screening   New sexual partner(s) since last STI/HIV test? No        History of abnormal Pap smear: YES - reflected in Problem List and Health Maintenance accordingly        Latest Ref Rng & Units 7/6/2022    10:44 AM 12/4/2019     9:12 AM 12/4/2019     9:01 AM   PAP / HPV   PAP  Negative for Intraepithelial Lesion or Malignancy (NILM)      PAP (Historical)    NIL    HPV 16 DNA Negative Negative  Negative     HPV 18 DNA Negative Negative  Negative     Other HR HPV Negative Negative  Negative       ASCVD Risk   The 10-year ASCVD risk score (Praveena LEE, et al., 2019) is: 3.3%    Values used to calculate the score:      Age: 49 years      Sex: Female      Is  Non- : No      Diabetic: No      Tobacco smoker: Yes      Systolic Blood Pressure: 120 mmHg      Is BP treated: No      HDL Cholesterol: 50 mg/dL      Total Cholesterol: 199 mg/dL       Reviewed and updated as needed this visit by Provider                    Past Medical History:   Diagnosis Date    Abnormal Pap smear     cone bx    Anxiety     Arthritis     Cancer (H)     PONV (postoperative nausea and vomiting)      Past Surgical History:   Procedure Laterality Date    C/SECTION, LOW TRANSVERSE      , Low Transverse    C/SECTION, LOW TRANSVERSE  2004    , Low Transverse    COLONOSCOPY N/A 2024    Procedure: Colonoscopy;  Surgeon: Jeromy Birch MD;  Location: [facility] OR    DAVINCI HYSTERECTOMY TOTAL, BILATERAL SALPINGO-OOPHORECTOMY, COMBINED  10/2/2012    Procedure: COMBINED DAVINCI HYSTERECTOMY TOTAL, SALPINGO-OOPHORECTOMY;   DAVINCI  Supra cervical HYSTERECTOMY TOTAL, Bilateral Salpingectomies with Left Oophorectomy and Cystoscopy;  Surgeon: Manasa Santos DO;  Location:  OR    [facility] NONSPECIFIC PROCEDURE      Cone Biopsy of cervix     Labs reviewed in EPIC  BP Readings from Last 3 Encounters:   24 120/80   24 118/84   24 132/83    Wt Readings from Last 3 Encounters:   24 99.3 kg (218 lb 14.4 oz)   24 97.4 kg (214 lb 11.2 oz)   24 100.2 kg (221 lb)                  Current Outpatient Medications   Medication Sig Dispense Refill    ascorbic acid (VITAMIN C) 1000 MG TABS Take 1 tablet (1,000 mg) by mouth daily 30 tablet     cholecalciferol (VITAMIN D3) 5000 UNITS TABS tablet Take by mouth daily      Ginger, Zingiber officinalis, (GINGER PO)       medical cannabis (Patient's own supply) See Admin Instructions (The purpose of this order is to document that the patient reports taking medical cannabis.  This is not a prescription, and is not used to certify that the patient has a qualifying medical condition.)      Probiotic  "Product (ACIDOPHILUS PROBIOTIC BLEND) CAPS       vitamin B complex with vitamin C (STRESS TAB) tablet Take 1 tablet by mouth daily  0    topiramate (TOPAMAX) 25 MG tablet Take 1 tablet (25 mg) by mouth 2 times daily (Patient not taking: Reported on 7/2/2024) 60 tablet 4     Allergies   Allergen Reactions    Amoxicillin-Pot Clavulanate Nausea and Vomiting    Flagyl [Metronidazole] Other (See Comments)     paresthesias            Objective    Exam  /80 (BP Location: Right arm, Patient Position: Sitting, Cuff Size: Adult Large)   Pulse 74   Temp 97.8  F (36.6  C) (Oral)   Resp 16   Ht 1.645 m (5' 4.75\")   Wt 99.3 kg (218 lb 14.4 oz)   LMP 09/26/2012   SpO2 98%   BMI 36.71 kg/m     Estimated body mass index is 36.71 kg/m  as calculated from the following:    Height as of this encounter: 1.645 m (5' 4.75\").    Weight as of this encounter: 99.3 kg (218 lb 14.4 oz).    Physical Exam  GENERAL: alert and no distress  EYES: Eyes grossly normal to inspection, PERRL and conjunctivae and sclerae normal  HENT: ear canals and TM's normal, nose and mouth without ulcers or lesions  NECK: no adenopathy, no asymmetry, masses, or scars  RESP: lungs clear to auscultation - no rales, rhonchi or wheezes  BREAST: normal without masses, tenderness or nipple discharge and no palpable axillary masses or adenopathy  CV: regular rate and rhythm, normal S1 S2, no S3 or S4, no murmur, click or rub, no peripheral edema  ABDOMEN: soft, nontender, no hepatosplenomegaly, no masses and bowel sounds normal   (female) w/bimanual: normal female external genitalia, normal urethral meatus, normal vaginal mucosa, and normal cervix/adnexa/uterus without masses or discharge  MS: no gross musculoskeletal defects noted, no edema  SKIN: no suspicious lesions or rashes  NEURO: Normal strength and tone, mentation intact and speech normal  PSYCH: mentation appears normal, affect normal/bright        Signed Electronically by: JENA Mullins " CNP

## 2024-07-02 NOTE — PATIENT INSTRUCTIONS
"Patient Education   Preventive Care Advice   This is general advice we often give to help people stay healthy. Your care team may have specific advice just for you. Please talk to your care team about your own preventive care needs.  Lifestyle  Exercise at least 150 minutes each week (30 minutes a day, 5 days a week).  Do muscle strengthening activities 2 days a week. These help control your weight and prevent disease.  No smoking.  Wear sunscreen to prevent skin cancer.  Have your home tested for radon every 2 to 5 years. Radon is a colorless, odorless gas that can harm your lungs. To learn more, go to www.health.[facility].[place].us and search for \"Radon in Homes.\"  Keep guns unloaded and locked up in a safe place like a safe or gun vault, or, use a gun lock and hide the keys. Always lock away bullets separately. To learn more, visit [employer].[place].gov and search for \"safe gun storage.\"  Nutrition  Eat 5 or more servings of fruits and vegetables each day.  Try wheat bread, brown rice and whole grain pasta (instead of white bread, rice, and pasta).  Get enough calcium and vitamin D. Check the label on foods and aim for 100% of the RDA (recommended daily allowance).  Regular exams  Have a dental exam and cleaning every 6 months.  See your health care team every year to talk about:  Any changes in your health.  Any medicines your care team has prescribed.  Preventive care, family planning, and ways to prevent chronic diseases.  Shots (vaccines)   HPV shots (up to age 26), if you've never had them before.  Hepatitis B shots (up to age 59), if you've never had them before.  COVID-19 shot: Get this shot when it's due.  Flu shot: Get a flu shot every year.  Tetanus shot: Get a tetanus shot every 10 years.  Pneumococcal, hepatitis A, and RSV shots: Ask your care team if you need these based on your risk.  Shingles shot (for age 50 and up).  General health tests  Diabetes screening:  Starting at age 35, Get screened for diabetes at least " every 3 years.  If you are younger than age 35, ask your care team if you should be screened for diabetes.  Cholesterol test: At age 39, start having a cholesterol test every 5 years, or more often if advised.  Bone density scan (DEXA): At age 50, ask your care team if you should have this scan for osteoporosis (brittle bones).  Hepatitis C: Get tested at least once in your life.  Abdominal aortic aneurysm screening: Talk to your doctor about having this screening if you:  Have ever smoked; and  Are biologically male; and  Are between the ages of 65 and 75.  STIs (sexually transmitted infections)  Before age 24: Ask your care team if you should be screened for STIs.  After age 24: Get screened for STIs if you're at risk. You are at risk for STIs (including HIV) if:  You are sexually active with more than one person.  You don't use condoms every time.  You or a partner was diagnosed with a sexually transmitted infection.  If you are at risk for HIV, ask about PrEP medicine to prevent HIV.  Get tested for HIV at least once in your life, whether you are at risk for HIV or not.  Cancer screening tests  Cervical cancer screening: If you have a cervix, begin getting regular cervical cancer screening tests at age 21. Most people who have regular screenings with normal results can stop after age 65. Talk about this with your provider.  Breast cancer scan (mammogram): If you've ever had breasts, begin having regular mammograms starting at age 40. This is a scan to check for breast cancer.  Colon cancer screening: It is important to start screening for colon cancer at age 45.  Have a colonoscopy test every 10 years (or more often if you're at risk) Or, ask your provider about stool tests like a FIT test every year or Cologuard test every 3 years.  To learn more about your testing options, visit: www.[employer]/023794.pdf.  For help making a decision, visit: doreen/qr20696.  Prostate cancer screening test: If you have a  prostate and are age 55 to 69, ask your provider if you would benefit from a yearly prostate cancer screening test.  Lung cancer screening: If you are a current or former smoker age 50 to 80, ask your care team if ongoing lung cancer screenings are right for you.  For informational purposes only. Not to replace the advice of your health care provider. Copyright   2023 [facility]. All rights reserved. Clinically reviewed by the [facility]. [employer] 359343 - REV 04/24.

## 2024-07-03 LAB
HPV HR 12 DNA CVX QL NAA+PROBE: NEGATIVE
HPV16 DNA CVX QL NAA+PROBE: NEGATIVE
HPV18 DNA CVX QL NAA+PROBE: NEGATIVE
HUMAN PAPILLOMA VIRUS FINAL DIAGNOSIS: NORMAL

## 2024-07-09 LAB
BKR LAB AP GYN ADEQUACY: NORMAL
BKR LAB AP GYN INTERPRETATION: NORMAL
BKR LAB AP PREVIOUS ABNORMAL: NORMAL
PATH REPORT.COMMENTS IMP SPEC: NORMAL
PATH REPORT.COMMENTS IMP SPEC: NORMAL
PATH REPORT.RELEVANT HX SPEC: NORMAL

## 2024-09-21 ENCOUNTER — PATIENT OUTREACH (OUTPATIENT)
Dept: CARE COORDINATION | Facility: CLINIC | Age: 49
End: 2024-09-21
Payer: COMMERCIAL

## 2024-11-06 ENCOUNTER — VIRTUAL VISIT (OUTPATIENT)
Dept: FAMILY MEDICINE | Facility: CLINIC | Age: 49
End: 2024-11-06
Payer: COMMERCIAL

## 2024-11-06 DIAGNOSIS — M79.7 FIBROMYALGIA: Primary | ICD-10-CM

## 2024-11-06 DIAGNOSIS — Z78.0 MENOPAUSE PRESENT: ICD-10-CM

## 2024-11-06 DIAGNOSIS — C53.9 MALIGNANT NEOPLASM OF CERVIX, UNSPECIFIED SITE (H): ICD-10-CM

## 2024-11-06 PROCEDURE — 99214 OFFICE O/P EST MOD 30 MIN: CPT | Mod: 95 | Performed by: PHYSICIAN ASSISTANT

## 2024-11-06 PROCEDURE — G2211 COMPLEX E/M VISIT ADD ON: HCPCS | Mod: 95 | Performed by: PHYSICIAN ASSISTANT

## 2024-11-06 NOTE — PROGRESS NOTES
Manasa is a 49 year old who is being evaluated via a billable video visit.    How would you like to obtain your AVS? MyChart  If the video visit is dropped, the invitation should be resent by: Text to cell phone: [phone]  Will anyone else be joining your video visit? No      Assessment & Plan     Fibromyalgia  [place] site for medical [place].  This does help control her symptoms well.    Malignant neoplasm of cervix, unspecified site (H)  Menopause present  I'm a little unclear about the treatment for cervical cancer.  This was years ago and she had a hysterectomy but also mentions that the cervix was not removed.  Still also has one ovary.  GYN consult to discuss HRT placed and will add FSH and estradiol to future labs.  - Follicle stimulating hormone; Future  - Estradiol; Future  - Ob/Gyn  Referral; Future    The longitudinal plan of care for the diagnosis(es)/condition(s) as documented were addressed during this visit. Due to the added complexity in care, I will continue to support Manasa in the subsequent management and with ongoing continuity of care.      I spent a total of 30 minutes on the day of the visit.   Time spent by me doing chart review, history and exam, documentation and further activities per the note        Subjective   Manasa is a 49 year old, presenting for the following health issues:  RE-CERT (Recertification of medical canabis)        11/6/2024     1:13 PM   Additional Questions   Roomed by Julia LAWSON     History of Present Illness       Reason for visit:  Recertification of medical cannabis   She is taking medications regularly.     #[id number]    Pain History:  When did you first notice your pain?  Years, fibromyalgia  Have you seen this provider for your pain in the past? Yes   Where in your body do you have pain? All over  Are you seeing anyone else for your pain? No    Needs recert for medical marijuana.  Has been using for some time now with good results.    Gets flares  "with weather, cold  Uses flower form at night prn      PDMP Review         Value Time User    State PDMP site checked  Yes 11/6/2024  2:17 PM Jarret Zepeda PA-C          Last CSA Agreement:   CSA -- Patient Level:    CSA: None found at the patient level.       Menopause questions  Hysterectomy years ago  Cervix remains and one ovary  Used to have some monthly spotting here and there but that has stopped.  Having menopausal symptoms  Wondering about HRT      Review of Systems  Constitutional, HEENT, cardiovascular, pulmonary, gi and gu systems are negative, except as otherwise noted.      Objective    Vitals - Patient Reported  Weight (Patient Reported): 96.6 kg (213 lb)  Height (Patient Reported): 165.1 cm (5' 5\")  BMI (Based on Pt Reported Ht/Wt): 35.44      Vitals:  No vitals were obtained today due to virtual visit.    Physical Exam   GENERAL: alert and no distress  EYES: Eyes grossly normal to inspection.  No discharge or erythema, or obvious scleral/conjunctival abnormalities.  RESP: No audible wheeze, cough, or visible cyanosis.    SKIN: Visible skin clear. No significant rash, abnormal pigmentation or lesions.  NEURO: Cranial nerves grossly intact.  Mentation and speech appropriate for age.  PSYCH: Appropriate affect, tone, and pace of words          Video-Visit Details    Type of service:  Video Visit   Originating Location (pt. Location): Home    Distant Location (provider location):  Off-site  Platform used for Video Visit: Anabela  Signed Electronically by: Jarret Zepeda PA-C    "

## 2024-11-14 ENCOUNTER — NURSE TRIAGE (OUTPATIENT)
Dept: FAMILY MEDICINE | Facility: CLINIC | Age: 49
End: 2024-11-14
Payer: COMMERCIAL

## 2024-11-14 NOTE — TELEPHONE ENCOUNTER
Has virtual appt with me on Monday.  Please assess and if actively febrile should be seen UC/ED today rather than virtual Monday.  At the least she needs an actual F2F appt.    Cedric Mosquera MD

## 2024-11-14 NOTE — TELEPHONE ENCOUNTER
Patient tested positive for Covid on 10/18/24. She reports her symptoms have continued since. She has shortness of breath with activity, cough, intermittent fever/chills, and loss of taste and smell. Her current O2 sat is 98% and HR is 79. She denies difficulty breathing at rest. She has chest irritation after coughing fits, but this is not present when she is not coughing. Per disposition patient should be seen within 3 days. Patient agrees and scheduled.     Reviewed reasons patient should seek immediate care including worsening shortness of breath, chest pain, or worsening symptoms. Patient verbalized understanding and denies questions.    Appointments in Next Year      Nov 15, 2024 4:00 PM  (Arrive by 3:40 PM)  Provider Visit with JENA Kelley CNP  [facility] ([facility] ) [phone]     Cathy Carrizales RN 11/14/2024 11:52 AM  [facility]    Reason for Disposition   [1] Diagnosed or suspected COVID-19 AND [2] symptoms lasting 3 or more weeks   [1] Persisting symptoms of COVID-19 AND [2] NO medical visit for COVID-19 in past 2 weeks    Additional Information   Negative: SEVERE difficulty breathing (e.g., struggling for each breath, speaks in single words)   Negative: Difficult to awaken or acting confused (e.g., disoriented, slurred speech)   Negative: Bluish (or gray) lips or face now   Negative: Shock suspected (e.g., cold/pale/clammy skin, too weak to stand, low BP, rapid pulse)   Negative: Sounds like a life-threatening emergency to the triager   Negative: SEVERE difficulty breathing (e.g., struggling for each breath, speaks in single words)   Negative: [1] SEVERE weakness (e.g., can't stand or can barely walk) AND [2] new-onset or WORSE   Negative: Difficult to awaken or acting confused (e.g., disoriented, slurred speech)   Negative: Bluish (or gray) lips or face now   Negative: Sounds like a life-threatening emergency to the  "triager   Negative: [1] COVID-19 symptoms AND [2] lasting less than 3 weeks   Negative: [1] Chest pain, pressure, or tightness AND [2] new-onset or getting worse   Negative: [1] Fever AND [2] new-onset or getting worse   Negative: [1] MODERATE difficulty breathing (e.g., speaks in phrases, SOB even at rest, pulse 100-120) AND [2] new-onset or WORSE   Negative: [1] MODERATE difficulty breathing AND [2] oxygen level (e.g., pulse oximetry) 91 to 94%   Negative: Oxygen level (e.g., pulse oximetry) 90% or lower   Negative: MODERATE difficulty breathing (e.g., speaks in phrases, SOB even at rest, pulse 100-120)   Negative: [1] Drinking very little AND [2] dehydration suspected (e.g., no urine > 12 hours, very dry mouth, very lightheaded)   Negative: Patient sounds very sick or weak to the triager   Negative: [1] MILD difficulty breathing (e.g., minimal/no SOB at rest, SOB with walking, pulse <100) AND [2] new-onset   Negative: [1] Persisting symptoms of COVID-19 AND [2] NEW symptom AND [3] could be serious   Negative: Oxygen level (e.g., pulse oximetry) 91 to 94%   Negative: [1] Caller has URGENT question AND [2] triager unable to answer question   Negative: [1] Persisting symptoms of COVID-19 AND [2] symptoms WORSE   Negative: [1] Caller has NON-URGENT question AND [2] triager unable to answer    Answer Assessment - Initial Assessment Questions  1. SYMPTOMS: \"What is your main symptom or concern?\" (e.g., cough, fever, shortness of breath, muscle aches)      Burning in chest, intermittent, feels like heartburn and irritation  2. ONSET: \"When did the symptoms start?\"       Week 4 since testing positive   3. COUGH: \"Do you have a cough?\" If Yes, ask: \"How bad is the cough?\"        Yes- mild to moderate   4. FEVER: \"Do you have a fever?\" If Yes, ask: \"What is your temperature, how was it measured, and when did it start?\"      Denies, has not had a fever for 2 weeks  5. BREATHING DIFFICULTY: \"Are you having any difficulty " "breathing?\" (e.g., normal; shortness of breath, wheezing, unable to speak)       Shortness of breath with activity  6. BETTER-SAME-WORSE: \"Are you getting better, staying the same or getting worse compared to yesterday?\"  If getting worse, ask, \"In what way?\"      Staying the same   7. OTHER SYMPTOMS: \"Do you have any other symptoms?\"  (e.g., chills, fatigue, headache, loss of smell or taste, muscle pain, sore throat)      Reports muscle pain, headache, chills on and off, loss of taste and smell (slowly coming back). Denies sore throat   8. COVID-19 DIAGNOSIS: \"How do you know that you have COVID?\" (e.g., positive lab test or self-test, diagnosed by doctor or NP/PA, symptoms after exposure).      Home test 10/18/24 and 11/1/24- both positive   9. COVID-19 EXPOSURE: \"Was there any known exposure to COVID before the symptoms began?\"       Unknown- returned from vacation  10. COVID-19 VACCINE: \"Have you had the COVID-19 vaccine?\" If Yes, ask: \"When did you last get it?\"        Initial vaccines and boosters. None since 2022  11. HIGH RISK DISEASE: \"Do you have any chronic medical problems?\" (e.g., asthma, heart or lung disease, weak immune system, obesity, etc.)        Fibromyalgia  12. PREGNANCY: \"Is there any chance you are pregnant?\" \"When was your last menstrual period?\"        Denies   13. O2 SATURATION MONITOR:  \"Do you use an oxygen saturation monitor (pulse oximeter) at home?\" If Yes, ask \"What is your reading (oxygen level) today?\" \"What is your usual oxygen saturation reading?\" (e.g., 95%)        Has been 96-97%. Right now 98%    Protocols used: COVID-19 - Diagnosed or [id number], COVID-19 - Persisting Symptoms Follow-up Call-A-    "

## 2024-11-15 ENCOUNTER — OFFICE VISIT (OUTPATIENT)
Dept: INTERNAL MEDICINE | Facility: CLINIC | Age: 49
End: 2024-11-15
Payer: COMMERCIAL

## 2024-11-15 VITALS
BODY MASS INDEX: 37.22 KG/M2 | HEIGHT: 64 IN | DIASTOLIC BLOOD PRESSURE: 85 MMHG | TEMPERATURE: 97.6 F | OXYGEN SATURATION: 98 % | SYSTOLIC BLOOD PRESSURE: 123 MMHG | HEART RATE: 72 BPM | WEIGHT: 218 LBS | RESPIRATION RATE: 20 BRPM

## 2024-11-15 DIAGNOSIS — U07.1 INFECTION DUE TO 2019 NOVEL CORONAVIRUS: Primary | ICD-10-CM

## 2024-11-15 PROCEDURE — 99213 OFFICE O/P EST LOW 20 MIN: CPT

## 2024-11-15 RX ORDER — PREDNISONE 20 MG/1
40 TABLET ORAL DAILY
Qty: 10 TABLET | Refills: 0 | Status: SHIPPED | OUTPATIENT
Start: 2024-11-15 | End: 2024-11-20

## 2024-11-15 NOTE — PROGRESS NOTES
Assessment & Plan     (U07.1) Infection due to 2019 novel coronavirus  (primary encounter diagnosis)  Comment: Patient presents to the clinic for a post viral cough. She has was positive for COVID 4 weeks ago. Lungs sound clear and equal bilaterally. Will prescribe steroids for postviral cough. 40 mg once daily in the AM for 5 days.   Plan: predniSONE (DELTASONE) 20 MG tablet        Medication sent to pharmacy.         20 minutes spent by me on the date of the encounter doing chart review, patient visit, and documentation       Patient Instructions   Prednisone- This medication is a steroid. It can naturally increase your blood sugar, therefore I suggest taking this medication with Protein (egg, sausage, rgider, protein shake/bar) in the AM. I also recommend taking this in the AM to avoid sleep disturbances. You are given 40 mg (2 Tablets) to be taken at once in the AM for 5 days.      Vitamin C 2,000 mg daily   Vitamin D 10,000 international unit(s) daily  Zinc 25 mg daily with food.   NAC to support lungs and immunity.       Rin Goel is a 49 year old, presenting for the following health issues:  Patient has had a cough for 4 weeks since having COVID. Cold triggers it and activity.   She has some GERD.   Traveled about a month ago to [place] and that's when she get sick.   Daughters also has COVID.   No fevers for about a week.   Chills occasionally.   Some SOB when she is walking or activity.   Difficult to walk up and down the stairs.   No nasal drainage.   Not as congested anymore.   OTC: nothing   Appetite: decreased at times. Lost her taste and smell. That is coming back.       Covid Concern (Tested positive on 10/18/24)               Review of Systems  Constitutional, HEENT, cardiovascular, pulmonary, gi and gu systems are negative, except as otherwise noted.      Objective    LMP 09/26/2012   There is no height or weight on file to calculate BMI.  Physical Exam   GENERAL: alert and no distress  NECK:  no adenopathy, no asymmetry, masses, or scars  RESP: lungs clear to auscultation - no rales, rhonchi or wheezes  CV: regular rate and rhythm, normal S1 S2, no S3 or S4, no murmur, click or rub, no peripheral edema  MS: no gross musculoskeletal defects noted, no edema            Signed Electronically by: JENA Kelley CNP

## 2024-11-15 NOTE — PATIENT INSTRUCTIONS
Prednisone- This medication is a steroid. It can naturally increase your blood sugar, therefore I suggest taking this medication with Protein (egg, sausage, grider, protein shake/bar) in the AM. I also recommend taking this in the AM to avoid sleep disturbances. You are given 40 mg (2 Tablets) to be taken at once in the AM for 5 days.      Vitamin C 2,000 mg daily   Vitamin D 10,000 international unit(s) daily  Zinc 25 mg daily with food.   NAC to support lungs and immunity.

## 2024-12-19 ENCOUNTER — PATIENT OUTREACH (OUTPATIENT)
Dept: CARE COORDINATION | Facility: CLINIC | Age: 49
End: 2024-12-19
Payer: COMMERCIAL

## 2025-01-22 ENCOUNTER — OFFICE VISIT (OUTPATIENT)
Dept: FAMILY MEDICINE | Facility: CLINIC | Age: 50
End: 2025-01-22
Payer: COMMERCIAL

## 2025-01-22 VITALS
HEART RATE: 74 BPM | OXYGEN SATURATION: 99 % | RESPIRATION RATE: 16 BRPM | HEIGHT: 65 IN | WEIGHT: 225.6 LBS | SYSTOLIC BLOOD PRESSURE: 122 MMHG | TEMPERATURE: 98.1 F | BODY MASS INDEX: 37.59 KG/M2 | DIASTOLIC BLOOD PRESSURE: 79 MMHG

## 2025-01-22 DIAGNOSIS — J34.89 NASAL SORE: Primary | ICD-10-CM

## 2025-01-22 DIAGNOSIS — C53.9 MALIGNANT NEOPLASM OF CERVIX, UNSPECIFIED SITE (H): ICD-10-CM

## 2025-01-22 PROBLEM — E66.812 CLASS 2 SEVERE OBESITY WITH SERIOUS COMORBIDITY AND BODY MASS INDEX (BMI) OF 36.0 TO 36.9 IN ADULT, UNSPECIFIED OBESITY TYPE (H): Status: ACTIVE | Noted: 2025-01-22

## 2025-01-22 PROBLEM — E66.01 CLASS 2 SEVERE OBESITY WITH SERIOUS COMORBIDITY AND BODY MASS INDEX (BMI) OF 36.0 TO 36.9 IN ADULT, UNSPECIFIED OBESITY TYPE (H): Status: ACTIVE | Noted: 2025-01-22

## 2025-01-22 PROCEDURE — G2211 COMPLEX E/M VISIT ADD ON: HCPCS | Performed by: FAMILY MEDICINE

## 2025-01-22 PROCEDURE — 99213 OFFICE O/P EST LOW 20 MIN: CPT | Performed by: FAMILY MEDICINE

## 2025-01-22 RX ORDER — MUPIROCIN 20 MG/G
OINTMENT TOPICAL 3 TIMES DAILY
Qty: 30 G | Refills: 0 | Status: SHIPPED | OUTPATIENT
Start: 2025-01-22

## 2025-01-22 ASSESSMENT — PAIN SCALES - GENERAL: PAINLEVEL_OUTOF10: NO PAIN (0)

## 2025-01-22 NOTE — PROGRESS NOTES
"  Assessment & Plan     Nasal sore  Recommend use of Mupirocin on sore, along with copious nasal saline to aid in moisture.  Follow up if not improving with ointment.  - mupirocin (BACTROBAN) 2 % external ointment; Apply topically 3 times daily. For 7-10 days, or until gone.    Malignant neoplasm of cervix, unspecified site (H)  Has follow up this summer for her next Pap.  History of [facility] Biopsy, s/p supracervical hysterectomy.    The longitudinal plan of care for the diagnosis(es)/condition(s) as documented were addressed during this visit. Due to the added complexity in care, I will continue to support Manasa in the subsequent management and with ongoing continuity of care.      BMI  Estimated body mass index is 37.54 kg/m  as calculated from the following:    Height as of this encounter: 1.651 m (5' 5\").    Weight as of this encounter: 102.3 kg (225 lb 9.6 oz).       See Patient Instructions    Subjective   Manasa is a 49 year old, presenting for the following health issues:  Epistaxis (Follow up)        1/22/2025    11:08 AM   Additional Questions   Roomed by Liane Meneses, EMT-B     Epistaxis    History of Present Illness       Reason for visit:  Nose bleed. Left nostril. Have had tbis issue since covid in October.  Symptom onset:  More than a month  Symptoms include:  Nise starts to bleed after blowing or randomly  Symptom intensity:  Moderate  Symptom progression:  Staying the same  Had these symptoms before:  No  What makes it worse:  N/A  What makes it better:  Using aquafore   She is taking medications regularly.     Pt reports that she is still experiencing occasional bloody coming left nostril when she blows her nose. Does not happen each time.  Has been happening since she had Covid back in Mid-October 2025.   Pt states it does feel there is something in the nostril, feels hard if she does not use Aquaphor.     Took a lot of ibuprofen.  Dry winter air.  She started getting a lot of bloody noses.  She uses " "aquaphor, and can feel a spot on the inside of the nose, like a \"skin tag\" of some sort, will have bleeding and then has a flat surface.  Will stop within a couple minutes, less than 5 minutes.    She has not tried a humidifier in her room, the one on her furnace is broken.  She has not used saline nasal spray.    Of note, elevated RF, but not diagosed with RA.    Current Outpatient Medications   Medication Sig Dispense Refill    ascorbic acid (VITAMIN C) 1000 MG TABS Take 1 tablet (1,000 mg) by mouth daily 30 tablet     cholecalciferol (VITAMIN D3) 5000 UNITS TABS tablet Take by mouth daily      Dionne, Zingiber officinalis, (DIONNE PO)       medical cannabis (Patient's own supply) See Admin Instructions (The purpose of this order is to document that the patient reports taking medical cannabis.  This is not a prescription, and is not used to certify that the patient has a qualifying medical condition.)      Probiotic Product (ACIDOPHILUS PROBIOTIC BLEND) CAPS       vitamin B complex with vitamin C (STRESS TAB) tablet Take 1 tablet by mouth daily  0           Objective    /79 (BP Location: Right arm, Patient Position: Sitting, Cuff Size: Adult Large)   Pulse 74   Temp 98.1  F (36.7  C) (Oral)   Resp 16   Ht 1.651 m (5' 5\")   Wt 102.3 kg (225 lb 9.6 oz)   LMP 09/26/2012   SpO2 99%   BMI 37.54 kg/m    Body mass index is 37.54 kg/m .  Physical Exam   GENERAL: alert and no distress  HENT: normal cephalic/atraumatic and ~3 mm circular erythematous sore in left nare, on septum.  No discharge noted.          Signed Electronically by: Valentina Gilliland MD    "

## 2025-01-22 NOTE — PATIENT INSTRUCTIONS
Mupirocin ointment, three times per day to the inside of the nose.  Do this for 7-10 days. Can stop early if gone before then.  Use saline nasal spray 4-5 times per day or more to help keep tissues moist.  If not improving, will refer to ENT

## 2025-01-24 ENCOUNTER — MYC MEDICAL ADVICE (OUTPATIENT)
Dept: FAMILY MEDICINE | Facility: CLINIC | Age: 50
End: 2025-01-24
Payer: COMMERCIAL

## 2025-01-24 DIAGNOSIS — J34.89 NASAL SORE: Primary | ICD-10-CM

## 2025-01-24 DIAGNOSIS — R04.0 EPISTAXIS: ICD-10-CM

## 2025-01-24 NOTE — TELEPHONE ENCOUNTER
Dr. Joey Gilliland   Please review my chart message, request for ENT referral   OV 1/22/25 nasal sore     RN placed call to patient after reviewing my chart to ensure nose had stopped bleeding   Advised referral request will be sent for provider review     Thank you   Janneth Norton, Registered Nurse  [facility]

## 2025-03-02 ENCOUNTER — MYC MEDICAL ADVICE (OUTPATIENT)
Dept: FAMILY MEDICINE | Facility: CLINIC | Age: 50
End: 2025-03-02
Payer: COMMERCIAL

## 2025-03-03 ENCOUNTER — E-VISIT (OUTPATIENT)
Dept: FAMILY MEDICINE | Facility: CLINIC | Age: 50
End: 2025-03-03
Payer: COMMERCIAL

## 2025-03-03 DIAGNOSIS — Z87.898 H/O MOTION SICKNESS: Primary | ICD-10-CM

## 2025-03-04 RX ORDER — SCOPOLAMINE 1 MG/3D
1 PATCH, EXTENDED RELEASE TRANSDERMAL
Qty: 4 PATCH | Refills: 0 | Status: SHIPPED | OUTPATIENT
Start: 2025-03-04

## 2025-03-05 ENCOUNTER — VIRTUAL VISIT (OUTPATIENT)
Dept: FAMILY MEDICINE | Facility: CLINIC | Age: 50
End: 2025-03-05
Payer: COMMERCIAL

## 2025-03-05 RX ORDER — SCOPOLAMINE 1 MG/3D
1 PATCH, EXTENDED RELEASE TRANSDERMAL
OUTPATIENT
Start: 2025-03-05

## 2025-07-06 SDOH — HEALTH STABILITY: PHYSICAL HEALTH: ON AVERAGE, HOW MANY DAYS PER WEEK DO YOU ENGAGE IN MODERATE TO STRENUOUS EXERCISE (LIKE A BRISK WALK)?: 3 DAYS

## 2025-07-06 SDOH — HEALTH STABILITY: PHYSICAL HEALTH: ON AVERAGE, HOW MANY MINUTES DO YOU ENGAGE IN EXERCISE AT THIS LEVEL?: 60 MIN

## 2025-07-06 ASSESSMENT — SOCIAL DETERMINANTS OF HEALTH (SDOH): HOW OFTEN DO YOU GET TOGETHER WITH FRIENDS OR RELATIVES?: TWICE A WEEK

## 2025-07-07 ENCOUNTER — OFFICE VISIT (OUTPATIENT)
Dept: FAMILY MEDICINE | Facility: CLINIC | Age: 50
End: 2025-07-07
Attending: NURSE PRACTITIONER
Payer: COMMERCIAL

## 2025-07-07 VITALS
BODY MASS INDEX: 37.04 KG/M2 | WEIGHT: 222.3 LBS | TEMPERATURE: 98 F | DIASTOLIC BLOOD PRESSURE: 74 MMHG | RESPIRATION RATE: 16 BRPM | SYSTOLIC BLOOD PRESSURE: 105 MMHG | OXYGEN SATURATION: 98 % | HEART RATE: 75 BPM | HEIGHT: 65 IN

## 2025-07-07 DIAGNOSIS — E66.812 CLASS 2 SEVERE OBESITY WITH SERIOUS COMORBIDITY AND BODY MASS INDEX (BMI) OF 36.0 TO 36.9 IN ADULT, UNSPECIFIED OBESITY TYPE (H): ICD-10-CM

## 2025-07-07 DIAGNOSIS — E66.01 CLASS 2 SEVERE OBESITY WITH SERIOUS COMORBIDITY AND BODY MASS INDEX (BMI) OF 36.0 TO 36.9 IN ADULT, UNSPECIFIED OBESITY TYPE (H): ICD-10-CM

## 2025-07-07 DIAGNOSIS — Z00.00 ROUTINE GENERAL MEDICAL EXAMINATION AT A HEALTH CARE FACILITY: Primary | ICD-10-CM

## 2025-07-07 DIAGNOSIS — R73.09 ELEVATED GLUCOSE: ICD-10-CM

## 2025-07-07 DIAGNOSIS — Z13.6 ENCOUNTER FOR LIPID SCREENING FOR CARDIOVASCULAR DISEASE: ICD-10-CM

## 2025-07-07 DIAGNOSIS — Z12.4 CERVICAL CANCER SCREENING: ICD-10-CM

## 2025-07-07 DIAGNOSIS — Z12.31 VISIT FOR SCREENING MAMMOGRAM: ICD-10-CM

## 2025-07-07 DIAGNOSIS — Z13.220 ENCOUNTER FOR LIPID SCREENING FOR CARDIOVASCULAR DISEASE: ICD-10-CM

## 2025-07-07 LAB
ANION GAP SERPL CALCULATED.3IONS-SCNC: 12 MMOL/L (ref 7–15)
BUN SERPL-MCNC: 12.3 MG/DL (ref 6–20)
CALCIUM SERPL-MCNC: 9.8 MG/DL (ref 8.8–10.4)
CHLORIDE SERPL-SCNC: 102 MMOL/L (ref 98–107)
CHOLEST SERPL-MCNC: 221 MG/DL
CREAT SERPL-MCNC: 0.92 MG/DL (ref 0.51–0.95)
EGFRCR SERPLBLD CKD-EPI 2021: 75 ML/MIN/1.73M2
EST. AVERAGE GLUCOSE BLD GHB EST-MCNC: 111 MG/DL
FASTING STATUS PATIENT QL REPORTED: YES
FASTING STATUS PATIENT QL REPORTED: YES
GLUCOSE SERPL-MCNC: 85 MG/DL (ref 70–99)
HBA1C MFR BLD: 5.5 % (ref 0–5.6)
HCO3 SERPL-SCNC: 26 MMOL/L (ref 22–29)
HDLC SERPL-MCNC: 53 MG/DL
HPV HR 12 DNA CVX QL NAA+PROBE: NEGATIVE
HPV16 DNA CVX QL NAA+PROBE: NEGATIVE
HPV18 DNA CVX QL NAA+PROBE: NEGATIVE
HUMAN PAPILLOMA VIRUS FINAL DIAGNOSIS: NORMAL
LDLC SERPL CALC-MCNC: 146 MG/DL
NONHDLC SERPL-MCNC: 168 MG/DL
POTASSIUM SERPL-SCNC: 4.5 MMOL/L (ref 3.4–5.3)
SODIUM SERPL-SCNC: 140 MMOL/L (ref 135–145)
TRIGL SERPL-MCNC: 109 MG/DL

## 2025-07-07 PROCEDURE — 36415 COLL VENOUS BLD VENIPUNCTURE: CPT | Performed by: NURSE PRACTITIONER

## 2025-07-07 PROCEDURE — 99396 PREV VISIT EST AGE 40-64: CPT | Performed by: NURSE PRACTITIONER

## 2025-07-07 PROCEDURE — 80061 LIPID PANEL: CPT | Performed by: NURSE PRACTITIONER

## 2025-07-07 PROCEDURE — G0145 SCR C/V CYTO,THINLAYER,RESCR: HCPCS | Performed by: NURSE PRACTITIONER

## 2025-07-07 PROCEDURE — 80048 BASIC METABOLIC PNL TOTAL CA: CPT | Performed by: NURSE PRACTITIONER

## 2025-07-07 PROCEDURE — 3074F SYST BP LT 130 MM HG: CPT | Performed by: NURSE PRACTITIONER

## 2025-07-07 PROCEDURE — 1126F AMNT PAIN NOTED NONE PRSNT: CPT | Performed by: NURSE PRACTITIONER

## 2025-07-07 PROCEDURE — 87624 HPV HI-RISK TYP POOLED RSLT: CPT | Performed by: NURSE PRACTITIONER

## 2025-07-07 PROCEDURE — 3044F HG A1C LEVEL LT 7.0%: CPT | Performed by: NURSE PRACTITIONER

## 2025-07-07 PROCEDURE — 3078F DIAST BP <80 MM HG: CPT | Performed by: NURSE PRACTITIONER

## 2025-07-07 PROCEDURE — 83036 HEMOGLOBIN GLYCOSYLATED A1C: CPT | Performed by: NURSE PRACTITIONER

## 2025-07-07 ASSESSMENT — PAIN SCALES - GENERAL: PAINLEVEL_OUTOF10: NO PAIN (0)

## 2025-07-07 NOTE — PATIENT INSTRUCTIONS
Patient Education   Preventive Care Advice   This is general advice given by our system to help you stay healthy. However, your care team may have specific advice just for you. Please talk to your care team about your preventive care needs.  Nutrition  Eat 5 or more servings of fruits and vegetables each day.  Try wheat bread, brown rice and whole grain pasta (instead of white bread, rice, and pasta).  Get enough calcium and vitamin D. Check the label on foods and aim for 100% of the RDA (recommended daily allowance).  Lifestyle  Exercise at least 150 minutes each week  (30 minutes a day, 5 days a week).  Do muscle strengthening activities 2 days a week. These help control your weight and prevent disease.  No smoking.  Wear sunscreen to prevent skin cancer.  Have a dental exam and cleaning every 6 months.  Yearly exams  See your health care team every year to talk about:  Any changes in your health.  Any medicines your care team has prescribed.  Preventive care, family planning, and ways to prevent chronic diseases.  Shots (vaccines)   HPV shots (up to age 26), if you've never had them before.  Hepatitis B shots (up to age 59), if you've never had them before.  COVID-19 shot: Get this shot when it's due.  Flu shot: Get a flu shot every year.  Tetanus shot: Get a tetanus shot every 10 years.  Pneumococcal, hepatitis A, and RSV shots: Ask your care team if you need these based on your risk.  Shingles shot (for age 50 and up)  General health tests  Diabetes screening:  Starting at age 35, Get screened for diabetes at least every 3 years.  If you are younger than age 35, ask your care team if you should be screened for diabetes.  Cholesterol test: At age 39, start having a cholesterol test every 5 years, or more often if advised.  Bone density scan (DEXA): At age 50, ask your care team if you should have this scan for osteoporosis (brittle bones).  Hepatitis C: Get tested at least once in your life.  STIs (sexually  transmitted infections)  Before age 24: Ask your care team if you should be screened for STIs.  After age 24: Get screened for STIs if you're at risk. You are at risk for STIs (including HIV) if:  You are sexually active with more than one person.  You don't use condoms every time.  You or a partner was diagnosed with a sexually transmitted infection.  If you are at risk for HIV, ask about PrEP medicine to prevent HIV.  Get tested for HIV at least once in your life, whether you are at risk for HIV or not.  Cancer screening tests  Cervical cancer screening: If you have a cervix, begin getting regular cervical cancer screening tests starting at age 21.  Breast cancer scan (mammogram): If you've ever had breasts, begin having regular mammograms starting at age 40. This is a scan to check for breast cancer.  Colon cancer screening: It is important to start screening for colon cancer at age 45.  Have a colonoscopy test every 10 years (or more often if you're at risk) Or, ask your provider about stool tests like a FIT test every year or Cologuard test every 3 years.  To learn more about your testing options, visit:   .  For help making a decision, visit:   https://bit.ly/ys96891.  Prostate cancer screening test: If you have a prostate, ask your care team if a prostate cancer screening test (PSA) at age 55 is right for you.  Lung cancer screening: If you are a current or former smoker ages 50 to 80, ask your care team if ongoing lung cancer screenings are right for you.  For informational purposes only. Not to replace the advice of your health care provider. Copyright   2023 [facility]. All rights reserved. Clinically reviewed by the [facility]itions [facility]. [employer] 599538 - REV 01/24.  Learning About Stress  What is stress?     Stress is your body's response to a hard situation. Your body can have a physical, emotional, or mental response. Stress is a fact of life for most people, and it  affects everyone differently. What causes stress for you may not be stressful for someone else.  A lot of things can cause stress. You may feel stress when you go on a job interview, take a test, or run a race. This kind of short-term stress is normal and even useful. It can help you if you need to work hard or react quickly. For example, stress can help you finish an important job on time.  Long-term stress is caused by ongoing stressful situations or events. Examples of long-term stress include long-term health problems, ongoing problems at work, or conflicts in your family. Long-term stress can harm your health.  How does stress affect your health?  When you are stressed, your body responds as though you are in danger. It makes hormones that speed up your heart, make you breathe faster, and give you a burst of energy. This is called the fight-or-flight stress response. If the stress is over quickly, your body goes back to normal and no harm is done.  But if stress happens too often or lasts too long, it can have bad effects. Long-term stress can make you more likely to get sick, and it can make symptoms of some diseases worse. If you tense up when you are stressed, you may develop neck, shoulder, or low back pain. Stress is linked to high blood pressure and heart disease.  Stress also harms your emotional health. It can make you grullon, tense, or depressed. Your relationships may suffer, and you may not do well at work or school.  What can you do to manage stress?  You can try these things to help manage stress:   Do something active. Exercise or activity can help reduce stress. Walking is a great way to get started. Even everyday activities such as housecleaning or yard work can help.  Try yoga or meghann chi. These techniques combine exercise and meditation. You may need some training at first to learn them.  Do something you enjoy. For example, listen to music or go to a movie. Practice your hobby or do volunteer  "work.  Meditate. This can help you relax, because you are not worrying about what happened before or what may happen in the future.  Do guided imagery. Imagine yourself in any setting that helps you feel calm. You can use online videos, books, or a teacher to guide you.  Do breathing exercises. For example:  From a standing position, bend forward from the waist with your knees slightly bent. Let your arms dangle close to the floor.  Breathe in slowly and deeply as you return to a standing position. Roll up slowly and lift your head last.  Hold your breath for just a few seconds in the standing position.  Breathe out slowly and bend forward from the waist.  Let your feelings out. Talk, laugh, cry, and express anger when you need to. Talking with supportive friends or family, a counselor, or a bahman leader about your feelings is a healthy way to relieve stress. Avoid discussing your feelings with people who make you feel worse.  Write. It may help to write about things that are bothering you. This helps you find out how much stress you feel and what is causing it. When you know this, you can find better ways to cope.  What can you do to prevent stress?  You might try some of these things to help prevent stress:  Manage your time. This helps you find time to do the things you want and need to do.  Get enough sleep. Your body recovers from the stresses of the day while you are sleeping.  Get support. Your family, friends, and community can make a difference in how you experience stress.  Limit your news feed. Avoid or limit time on social media or news that may make you feel stressed.  Do something active. Exercise or activity can help reduce stress. Walking is a great way to get started.  Where can you learn more?  Go to https://www.[employer].net/patiented  Enter N032 in the search box to learn more about \"Learning About Stress.\"  Current as of: October 24, 2024  Content Version: 14.5 2024-2025 [employer].   Care instructions adapted under license by your healthcare professional. If you have questions about a medical condition or this instruction, always ask your healthcare professional. [employer] disclaims any warranty or liability for your use of this information.

## 2025-07-07 NOTE — PROGRESS NOTES
"Preventive Care Visit  [facility] JENA Serrano CNP, Family Medicine  Jul 7, 2025      Assessment & Plan     Routine general medical examination at a health care facility  Reviewed age appropriate screenings and immunizations.  Declines vaccines today.    Visit for screening mammogram  Due; can schedule via [employer].  - MA Screening Bilateral w/ Jerry; Future    Cervical cancer screening    - HPV and Gynecologic Cytology Panel - Recommended Age 30 - 65 Years    Class 2 severe obesity with serious comorbidity and body mass index (BMI) of 36.0 to 36.9 in adult, unspecified obesity type (H)  Discussed diet and exercise    Elevated glucose  Rechecking lab today.  - Hemoglobin A1c; Future  - Basic metabolic panel  (Ca, Cl, CO2, Creat, Gluc, K, Na, BUN); Future  - Hemoglobin A1c  - Basic metabolic panel  (Ca, Cl, CO2, Creat, Gluc, K, Na, BUN)    Encounter for lipid screening for cardiovascular disease  screen  - Lipid panel reflex to direct LDL Fasting; Future  - Lipid panel reflex to direct LDL Fasting            BMI  Estimated body mass index is 36.99 kg/m  as calculated from the following:    Height as of this encounter: 1.651 m (5' 5\").    Weight as of this encounter: 100.8 kg (222 lb 4.8 oz).   Weight management plan: Discussed healthy diet and exercise guidelines    Counseling  Appropriate preventive services were addressed with this patient via screening, questionnaire, or discussion as appropriate for fall prevention, nutrition, physical activity, Tobacco-use cessation, social engagement, weight loss and cognition.  Checklist reviewing preventive services available has been given to the patient.  Reviewed patient's diet, addressing concerns and/or questions.   She is at risk for lack of exercise and has been provided with information to increase physical activity for the benefit of her well-being.   She is at risk for psychosocial distress and has been provided with information to " reduce risk.         Follow-up 1 year annual physical     Subjective   Manasa is a 50 year old, presenting for the following:  Physical (Pap done today /Pt is fasting )        7/7/2025     8:13 AM   Additional Questions   Roomed by Jami GROSS MA   Accompanied by self         7/7/2025     8:13 AM   Patient Reported Additional Medications   Patient reports taking the following new medications none          HPI     Hx of supracervical hysterectomy.  Hx of cervical cancer treated with cone biopsy and LEEP. Has been doing annual paps.         Advance Care Planning    Advance care planning document is on file but is outdated.  Patient encouraged to update or provider to update POLST.        7/6/2025   General Health   How would you rate your overall physical health? (!) FAIR   Feel stress (tense, anxious, or unable to sleep) Rather much   (!) STRESS CONCERN      7/6/2025   Nutrition   Three or more servings of calcium each day? Yes   Diet: Regular (no restrictions)   How many servings of fruit and vegetables per day? (!) 2-3   How many sweetened beverages each day? 0-1         7/6/2025   Exercise   Days per week of moderate/strenous exercise 3 days   Average minutes spent exercising at this level 60 min         7/6/2025   Social Factors   Frequency of gathering with friends or relatives Twice a week   Worry food won't last until get money to buy more No   Food not last or not have enough money for food? No   Do you have housing? (Housing is defined as stable permanent housing and does not include staying outside in a car, in a tent, in an abandoned building, in an overnight shelter, or couch-surfing.) Yes   Are you worried about losing your housing? No   Lack of transportation? No   Unable to get utilities (heat,electricity)? No         7/6/2025   Fall Risk   Fallen 2 or more times in the past year? No   Trouble with walking or balance? No          7/6/2025   Dental   Dentist two times every year? Yes         Today's PHQ-2 Score:        7/6/2025     9:54 PM   PHQ-2 ( 1999 [employer])   Q1: Little interest or pleasure in doing things 0   Q2: Feeling down, depressed or hopeless 1   PHQ-2 Score 1    Q1: Little interest or pleasure in doing things Not at all   Q2: Feeling down, depressed or hopeless Several days   PHQ-2 Score 1       Patient-reported           7/7/2025   Substance Use   If I could quit smoking, I would Neutral   I want to quit somking, worry about health affects Neutral   Willing to make a plan to quit smoking Neutral   Willing to cut down before quitting Neutral   Does not use nicotine, has never smoked cigarettes. Uses medical marijuana for her fibro.   Social History     Tobacco Use    Smoking status: Never    Smokeless tobacco: Never    Tobacco comments:     Medical marijuana   Vaping Use    Vaping status: Never Used   Substance Use Topics    Alcohol use: Not Currently     Comment: I drink very little at this point. One drink every two month    Drug use: Yes     Types: Marijuana     Comment: medical marijuana           7/3/2022   LAST FHS-7 RESULTS   1st degree relative breast or ovarian cancer No   Any relative bilateral breast cancer No   Any male have breast cancer No   Any ONE woman have BOTH breast AND ovarian cancer No   Any woman with breast cancer before 50yrs No   2 or more relatives with breast AND/OR ovarian cancer No   2 or more relatives with breast AND/OR bowel cancer No        Mammogram Screening - Mammogram every 1-2 years updated in Health Maintenance based on mutual decision making        7/6/2025   STI Screening   New sexual partner(s) since last STI/HIV test? No     History of abnormal Pap smear: Status post hysterectomy. Pap still indicated.         Latest Ref Rng & Units 7/2/2024    11:41 AM 7/6/2022    10:44 AM 12/4/2019     9:12 AM   PAP / HPV   PAP  Negative for Intraepithelial Lesion or Malignancy (NILM)  Negative for Intraepithelial Lesion or Malignancy (NILM)     HPV 16 DNA Negative Negative  Negative   Negative    HPV 18 DNA Negative Negative  Negative  Negative    Other HR HPV Negative Negative  Negative  Negative      ASCVD Risk   The 10-year ASCVD risk score (Praveena LEE, et al., 2019) is: 0.9%    Values used to calculate the score:      Age: 50 years      Sex: Female      Is Non- : No      Diabetic: No      Tobacco smoker: No      Systolic Blood Pressure: 105 mmHg      Is BP treated: No      HDL Cholesterol: 50 mg/dL      Total Cholesterol: 199 mg/dL           Reviewed and updated as needed this visit by Provider   Tobacco  Allergies  Meds  Problems  Med Hx  Surg Hx  Fam Hx            Past Medical History:   Diagnosis Date    Abnormal Pap smear     cone bx    Anxiety     Arthritis     Cancer (H)     PONV (postoperative nausea and vomiting)      Past Surgical History:   Procedure Laterality Date    C/SECTION, LOW TRANSVERSE      , Low Transverse    C/SECTION, LOW TRANSVERSE  2004    , Low Transverse    COLONOSCOPY N/A 2024    Procedure: Colonoscopy;  Surgeon: Jeromy Birch MD;  Location: [facility] OR    DAVINCI HYSTERECTOMY TOTAL, BILATERAL SALPINGO-OOPHORECTOMY, COMBINED  10/2/2012    Procedure: COMBINED DAVINCI HYSTERECTOMY TOTAL, SALPINGO-OOPHORECTOMY;   DAVINCI  Supra cervical HYSTERECTOMY TOTAL, Bilateral Salpingectomies with Left Oophorectomy and Cystoscopy;  Surgeon: Manasa Santos DO;  Location:  OR    [facility] NONSPECIFIC PROCEDURE      Cone Biopsy of cervix     Labs reviewed in EPIC  BP Readings from Last 3 Encounters:   25 105/74   25 122/79   11/15/24 123/85    Wt Readings from Last 3 Encounters:   25 100.8 kg (222 lb 4.8 oz)   25 102.3 kg (225 lb 9.6 oz)   11/15/24 98.9 kg (218 lb)                  Current Outpatient Medications   Medication Sig Dispense Refill    ascorbic acid (VITAMIN C) 1000 MG TABS Take 1 tablet (1,000 mg) by mouth daily 30 tablet     cholecalciferol (VITAMIN D3) 5000 UNITS TABS tablet Take by  "mouth daily      Dionne, Zingiber officinalis, (DIONNE PO)       medical cannabis (Patient's own supply) See Admin Instructions (The purpose of this order is to document that the patient reports taking medical cannabis.  This is not a prescription, and is not used to certify that the patient has a qualifying medical condition.)      Probiotic Product (ACIDOPHILUS PROBIOTIC BLEND) CAPS       vitamin B complex with vitamin C (STRESS TAB) tablet Take 1 tablet by mouth daily  0    mupirocin (BACTROBAN) 2 % external ointment Apply topically 3 times daily. For 7-10 days, or until gone. (Patient not taking: Reported on 7/7/2025) 30 g 0    Omeprazole 20 MG TBDD Omeprazole       Allergies   Allergen Reactions    Amoxicillin-Pot Clavulanate Nausea and Vomiting    Flagyl [Metronidazole] Other (See Comments)     paresthesias    Seasonal Allergies Unknown            Objective    Exam  /74 (BP Location: Right arm, Patient Position: Sitting, Cuff Size: Adult Large)   Pulse 75   Temp 98  F (36.7  C) (Temporal)   Resp 16   Ht 1.651 m (5' 5\")   Wt 100.8 kg (222 lb 4.8 oz)   LMP 09/26/2012   SpO2 98%   BMI 36.99 kg/m     Estimated body mass index is 36.99 kg/m  as calculated from the following:    Height as of this encounter: 1.651 m (5' 5\").    Weight as of this encounter: 100.8 kg (222 lb 4.8 oz).    Physical Exam  GENERAL: alert and no distress  EYES: Eyes grossly normal to inspection, PERRL and conjunctivae and sclerae normal  HENT: ear canals and TM's normal, nose and mouth without ulcers or lesions  NECK: no adenopathy, no asymmetry, masses, or scars  RESP: lungs clear to auscultation - no rales, rhonchi or wheezes  CV: regular rate and rhythm, normal S1 S2, no S3 or S4, no murmur, click or rub, no peripheral edema  ABDOMEN: soft, nontender, no hepatosplenomegaly, no masses and bowel sounds normal   (female): normal female external genitalia, normal urethral meatus, normal vaginal mucosa  MS: no gross " musculoskeletal defects noted, no edema  SKIN: no suspicious lesions or rashes  NEURO: Normal strength and tone, mentation intact and speech normal  PSYCH: mentation appears normal, affect normal/bright        Signed Electronically by: JENA Mullins CNP

## 2025-07-08 ENCOUNTER — PATIENT OUTREACH (OUTPATIENT)
Dept: CARE COORDINATION | Facility: CLINIC | Age: 50
End: 2025-07-08
Payer: COMMERCIAL

## 2025-07-10 ENCOUNTER — PATIENT OUTREACH (OUTPATIENT)
Dept: FAMILY MEDICINE | Facility: CLINIC | Age: 50
End: 2025-07-10
Payer: COMMERCIAL

## 2025-07-10 LAB
BKR AP ASSOCIATED HPV REPORT: NORMAL
BKR LAB AP GYN ADEQUACY: NORMAL
BKR LAB AP GYN INTERPRETATION: NORMAL
BKR LAB AP PREVIOUS ABNORMAL: NORMAL
PATH REPORT.COMMENTS IMP SPEC: NORMAL
PATH REPORT.COMMENTS IMP SPEC: NORMAL
PATH REPORT.RELEVANT HX SPEC: NORMAL

## (undated) DEVICE — SOL WATER IRRIG 1000ML BOTTLE 2F7114

## (undated) DEVICE — BAG CLEAR TRASH 1.3M 39X33" P4040C

## (undated) DEVICE — GOWN XLG DISP 9545

## (undated) DEVICE — SUCTION MANIFOLD NEPTUNE 2 SYS 4 PORT 0702-020-000

## (undated) DEVICE — COVER FOOTSWITCH URO

## (undated) DEVICE — TUBING SUCTION MEDI-VAC SOFT 3/16"X20' N520A

## (undated) DEVICE — PAD CHUX UNDERPAD 30X36" P3036C

## (undated) DEVICE — APPLICATOR COTTON TIP 6"X2 STERILE LF 6012

## (undated) RX ORDER — PROPOFOL 10 MG/ML
INJECTION, EMULSION INTRAVENOUS
Status: DISPENSED
Start: 2024-02-22